# Patient Record
Sex: FEMALE | Race: WHITE | NOT HISPANIC OR LATINO | Employment: FULL TIME | URBAN - METROPOLITAN AREA
[De-identification: names, ages, dates, MRNs, and addresses within clinical notes are randomized per-mention and may not be internally consistent; named-entity substitution may affect disease eponyms.]

---

## 2017-02-10 DIAGNOSIS — N92.0 POLYMENORRHEA: Primary | ICD-10-CM

## 2017-02-10 NOTE — TELEPHONE ENCOUNTER
norgestrel-ethinyl estradiol (LO/OVRAL) 0.3-30 MG-MCG per tablet      Last Written Prescription Date: 2/8/16  Last Fill Quantity: 84, # refills: 5  Last Office Visit with G, P or [facility] prescribing provider: 3/3/16       BP Readings from Last 3 Encounters:   12/22/16 108/86   03/03/16 130/80   02/08/16 118/74     Date of last Breast Exam: .

## 2017-02-14 NOTE — TELEPHONE ENCOUNTER
Medication is being filled for 1 time refill only due to:  Patient needs to be seen because as it will be over a year since last seen in March .     Signed Prescriptions:                        Disp   Refills    norgestrel-ethinyl estradiol (LO/OVRAL) 0.*84 tab*0        Sig: Take 1 tablet by mouth daily  Authorizing Provider: KRISTINA DUNLAP  Ordering User: IGOR YOUSSEF, RN, BSN

## 2017-03-01 ENCOUNTER — TELEPHONE (OUTPATIENT)
Dept: FAMILY MEDICINE | Facility: CLINIC | Age: 42
End: 2017-03-01

## 2017-03-01 DIAGNOSIS — F41.9 ANXIETY: ICD-10-CM

## 2017-03-01 DIAGNOSIS — F51.01 PRIMARY INSOMNIA: ICD-10-CM

## 2017-03-01 RX ORDER — TRAZODONE HYDROCHLORIDE 50 MG/1
50 TABLET, FILM COATED ORAL
Qty: 30 TABLET | Refills: 0 | Status: SHIPPED | OUTPATIENT
Start: 2017-03-01 | End: 2017-05-30

## 2017-03-01 NOTE — TELEPHONE ENCOUNTER
FLUoxetine (PROZAC) 40 MG capsule  Last Written Prescription Date: 2/8/2016  Last Fill Quantity: 90, # refills: 4  Last Office Visit with [facility] primary care provider:  12/22/2016        Last PHQ-9 score on record=   PHQ-9 SCORE 12/22/2016   Total Score -   Total Score 6

## 2017-03-01 NOTE — TELEPHONE ENCOUNTER
Signed Prescriptions:                        Disp   Refills    traZODone (DESYREL) 50 MG tablet           30 tab*0        Sig: Take 1 tablet (50 mg) by mouth nightly as needed for           sleep . No further refills until follow-up           appointment  Authorizing Provider: MOISÉS WHYTE

## 2017-03-01 NOTE — TELEPHONE ENCOUNTER
Reason for Call:  Other prescription    Detailed comments: [facility] pharmacy called to check on the refill status for the prescriptionTrazodone    Fax number  [phone]    Phone Number Patient can be reached at: Other phone number:  [phone]    Best Time: today    Can we leave a detailed message on this number? YES    Call taken on 3/1/2017 at 10:09 AM by Ti Carbajal

## 2017-03-01 NOTE — TELEPHONE ENCOUNTER
Reason for call: med refill  Patient called regarding (reason for call): prescription- Fluoxetine  Additional comments: na    Phone Number Pt can be reached at: Other phone number:  [phone]*  Best Time: anytime  Can we leave a detailed message on this number? YES

## 2017-03-01 NOTE — TELEPHONE ENCOUNTER
traZODone (DESYREL) 50 MG tablet  Last Written Prescription Date: 2/8/2016  Last Fill Quantity: 90, # refills: 4  Last Office Visit with [facility], [facility] or [facility] prescribing provider: 12/22/2016        BP Readings from Last 3 Encounters:   12/22/16 108/86   03/03/16 130/80   02/08/16 118/74     Last PHQ-9 score on record=   PHQ-9 SCORE 12/22/2016   Total Score -   Total Score 6

## 2017-03-03 NOTE — TELEPHONE ENCOUNTER
Routing refill request to provider for review/approval because:  Patient's last PHQ-9 score was > 4.  Please review and advised on next step for plan of care.    Zane DALEY RN, BSN

## 2017-03-03 NOTE — TELEPHONE ENCOUNTER
Call pt  Recommend visit to discuss getting mood in better control--   Advise that we can adjust medication or do counseling or both  If she declines, OK to fill x 3 months, then have visit  Jerrica Hay MD

## 2017-03-05 RX ORDER — FLUOXETINE 40 MG/1
40 CAPSULE ORAL EVERY MORNING
Qty: 90 CAPSULE | Refills: 0 | Status: SHIPPED | OUTPATIENT
Start: 2017-03-05 | End: 2017-05-30

## 2017-04-04 DIAGNOSIS — F51.01 PRIMARY INSOMNIA: ICD-10-CM

## 2017-04-04 NOTE — TELEPHONE ENCOUNTER
traZODone (DESYREL) 50 MG tablet       Last Written Prescription Date: 3/1/17  Last Fill Quantity: 30; # refills: 0  Last Office Visit with [facility], [facility] or [facility] prescribing provider:  12/22/16        Last PHQ-9 score on record=   PHQ-9 SCORE 12/22/2016   Total Score -   Total Score 6       Lab Results   Component Value Date    AST 11 12/22/2016     Lab Results   Component Value Date    ALT 19 12/22/2016

## 2017-04-05 RX ORDER — TRAZODONE HYDROCHLORIDE 50 MG/1
TABLET, FILM COATED ORAL
Start: 2017-04-05

## 2017-04-05 NOTE — TELEPHONE ENCOUNTER
Refused Prescriptions:                       Disp   Refills    traZODone (DESYREL) 50 MG tablet [Pharmacy*                Sig: TAKE 1 TABLET BY MOUTH NIGHTLY AS NEEDED FOR SLEEP  Refused By: MOISÉS WHYTE  Reason for Refusal: Appt required, please call patient

## 2017-04-05 NOTE — TELEPHONE ENCOUNTER
Routing refill request to provider for review/approval because:  Elizabeth given x1 and patient did not follow up, please advise    Cathy Camarena RN - BC

## 2017-05-23 NOTE — PROGRESS NOTES
SUBJECTIVE:     CC: Megan Dowd is an 42 year old woman who presents for preventive health visit.     Healthy Habits:    Do you get at least three servings of calcium containing foods daily (dairy, green leafy vegetables, etc.)? yes    Amount of exercise or daily activities, outside of work: 3 day(s) per week    Problems taking medications regularly No    Medication side effects: No    Have you had an eye exam in the past two years? yes    Do you see a dentist twice per year? yes    Do you have sleep apnea, excessive snoring or daytime drowsiness?no             Today's PHQ-2 Score:   PHQ-2 ( 1999 Pfizer) 5/30/2017 5/30/2017   Q1: Little interest or pleasure in doing things 1 0   Q2: Feeling down, depressed or hopeless 0 0   PHQ-2 Score 1 0       Abuse: Current or Past(Physical, Sexual or Emotional)- No  Do you feel safe in your environment - Yes    Social History   Substance Use Topics     Smoking status: Current Every Day Smoker     Packs/day: 0.30     Years: 18.00     Types: Cigarettes     Last attempt to quit: 8/1/2009     Smokeless tobacco: Never Used     Alcohol use Yes      Comment: SOCIALLY     The patient does not drink >3 drinks per day nor >7 drinks per week.    Recent Labs   Lab Test  02/08/16   0843  06/15/11   0833   CHOL  166  184   HDL  52  62   LDL  102*  104   TRIG  62  93   CHOLHDLRATIO   --   3.0   NHDL  114   --        Reviewed orders with patient.  Reviewed health maintenance and updated orders accordingly - Yes    Mammo Decision Support:  Patient under age 50, mutual decision reflected in health maintenance.      Pertinent mammograms are reviewed under the imaging tab.  History of abnormal Pap smear: NO - age 30-65 PAP every 5 years with negative HPV co-testing recommended    Reviewed and updated as needed this visit by clinical staff  Tobacco  Allergies  Meds  Problems  Med Hx  Surg Hx  Fam Hx  Soc Hx          Reviewed and updated as needed this visit by Provider  Allergies   Meds  Problems        Immunization History   Administered Date(s) Administered     DPT 1974, 1974, 1974, 08/01/1975     Hepatitis A Vac Ped/Adol-2 Dose 12/01/2006, 06/22/2007, 02/15/2008     Influenza (IIV3) 10/20/2012, 10/01/2014, 11/01/2016     Influenza Vaccine IM 3yrs+ 4 Valent IIV4 10/08/2015     MMR 02/13/1975, 12/11/1992     OPV 1974, 1974, 08/01/1975     TD (ADULT, 7+) 07/24/2002     TDAP Vaccine (Adacel) 03/15/2010        ROS:  This 42 year old female is here today for annual female exam. She works full time at [employer]. 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 She has no children. Her  does the snow removal, but patient does the lawn mowing as she likes the relaxation while she mows their 6 acres. They have no children. Patient would like to use chantix again to quit smoking. She feels ready to quit.   Patient was seen at an ER in October after having blood in the stool after passing a hard bowel movement. She was told that her exam was normal. She has not had constipation since then, but still gets occasional blood with bowel movement. She wonders if she could have hemerrohoids. She worries she could have colon cancer as she constantly feels a pressure in her lower colon like she has to have a bowel movement and she can have up to 4 bowel movements daily. No pain with bowel movements.   She needs a refill of her xanax. She only uses about 10 yearly for rare anxiety episodes.   She wonders if she still needs to stay on birth control pills to suppress her ovarian cysts. Cysts have been very painful in the past and she doesn't want that pain again.   C: NEGATIVE for fever, chills, change in weight  I: NEGATIVE for worrisome rashes, moles or lesions  E: NEGATIVE for vision changes or irritation  ENT: NEGATIVE for ear, mouth and throat problems  R: NEGATIVE for significant cough or SOB  B: NEGATIVE for  masses, tenderness or discharge  CV: NEGATIVE for chest pain, palpitations or peripheral edema  GI: NEGATIVE for nausea, abdominal pain, heartburn, or change in bowel habits  : NEGATIVE for unusual urinary or vaginal symptoms. Periods are regular.  M: NEGATIVE for significant arthralgias or myalgia  N: NEGATIVE for weakness, dizziness or paresthesias  P: NEGATIVE for changes in mood or affect    Problem list, Medication list, Allergies, and Medical/Social/Surgical histories reviewed in [facility] and updated as appropriate.  Labs reviewed in EPIC  BP Readings from Last 3 Encounters:   05/30/17 136/74   12/22/16 108/86   03/03/16 130/80    Wt Readings from Last 3 Encounters:   05/30/17 228 lb (103.4 kg)   12/22/16 238 lb (108 kg)   03/03/16 227 lb (103 kg)                  Patient Active Problem List   Diagnosis     Insomnia     Anxiety     IBS (irritable bowel syndrome)     CARDIOVASCULAR SCREENING; LDL GOAL LESS THAN 160     Family history of diabetes mellitus     Polymenorrhea     Dysmenorrhea     Ovarian cyst     Past Surgical History:   Procedure Laterality Date     BREAST SURGERY  3/2013    breast implants     SURGICAL HISTORY OF -   3/13    bilateral breast augmentations with implants     TUBAL LIGATION  4/07       Social History   Substance Use Topics     Smoking status: Current Every Day Smoker     Packs/day: 0.30     Years: 18.00     Types: Cigarettes     Last attempt to quit: 8/1/2009     Smokeless tobacco: Never Used     Alcohol use Yes      Comment: SOCIALLY     Family History   Problem Relation Age of Onset     Hypertension Mother      DIABETES Father      Hypertension Father      Gynecology Maternal Grandmother      ENDOMETRIOSIS     HEART DISEASE Maternal Grandmother      MINI HEART ATTACKS     Unknown/Adopted Maternal Grandfather      CANCER Paternal Grandmother      CANCER Paternal Grandfather      CANCER OF SPINE     Gynecology Sister      endometriosis--had hysterectomy     Gynecology Sister      has  "had cysts--had partial hysterectomy         Current Outpatient Prescriptions   Medication Sig Dispense Refill     FLUoxetine (PROZAC) 40 MG capsule Take 1 capsule (40 mg) by mouth every morning 90 capsule 4     traZODone (DESYREL) 50 MG tablet Take 1 tablet (50 mg) by mouth nightly as needed for sleep 90 tablet 4     norgestrel-ethinyl estradiol (LO/OVRAL) 0.3-30 MG-MCG per tablet Take 1 tablet by mouth daily 84 tablet 4     ALPRAZolam (XANAX) 0.5 MG tablet Take 1 tablet (0.5 mg) by mouth as needed for anxiety 10 tablet 0     varenicline (CHANTIX STARTING MONTH PAK) 0.5 MG X 11 & 1 MG X 42 tablet Take 0.5 mg tab daily for 3 days, then 0.5 mg tab twice daily for 4 days, then 1 mg twice daily. 53 tablet 0     varenicline (CHANTIX) 1 MG tablet Take 1 tablet (1 mg) by mouth 2 times daily 56 tablet 2     omeprazole (PRILOSEC) 20 MG CR capsule Take 1 capsule (20 mg) by mouth daily 30 capsule 2     [DISCONTINUED] FLUoxetine (PROZAC) 40 MG capsule Take 1 capsule (40 mg) by mouth every morning 90 capsule 0     [DISCONTINUED] traZODone (DESYREL) 50 MG tablet Take 1 tablet (50 mg) by mouth nightly as needed for sleep . No further refills until follow-up appointment 30 tablet 0     [DISCONTINUED] norgestrel-ethinyl estradiol (LO/OVRAL) 0.3-30 MG-MCG per tablet Take 1 tablet by mouth daily 84 tablet 0     Allergies   Allergen Reactions     Lexapro      Increased anxiety       Zoloft      NIGHT SWEATS AND RASH FROM SWEATING AND INCREASE IN WEIGHT     OBJECTIVE:     /74 (BP Location: Right arm, Patient Position: Chair, Cuff Size: Adult Large)  Pulse 87  Temp 99.1  F (37.3  C)  Ht 5' 5\" (1.651 m)  Wt 228 lb (103.4 kg)  SpO2 97%  BMI 37.94 kg/m2  EXAM:  GENERAL: healthy, alert and no distress  EYES: Eyes grossly normal to inspection, PERRL and conjunctivae and sclerae normal  HENT: ear canals and TM's normal, nose and mouth without ulcers or lesions  NECK: no adenopathy, no asymmetry, masses, or scars and thyroid normal " to palpation  RESP: lungs clear to auscultation - no rales, rhonchi or wheezes  BREAST: normal without masses, tenderness or nipple discharge and no palpable axillary masses or adenopathy. She has had breast reduction surgery and her scars have healed well.   CV: regular rate and rhythm, normal S1 S2, no S3 or S4, no murmur, click or rub, no peripheral edema and peripheral pulses strong  ABDOMEN: soft, nontender, no hepatosplenomegaly, no masses and bowel sounds normal   (female): normal female external genitalia, normal urethral meatus, vaginal mucosa pink, moist, well rugated, and normal cervix/adnexa/uterus without masses or discharge  MS: no gross musculoskeletal defects noted, no edema  SKIN: no suspicious lesions or rashes  NEURO: Normal strength and tone, mentation intact and speech normal  PSYCH: mentation appears normal, affect normal/bright    ASSESSMENT/PLAN:     1. Encounter for routine adult health examination without abnormal findings  Healthy but overweight. But has lost 10 pounds in the past 6 months    2. Primary insomnia  Good control   - traZODone (DESYREL) 50 MG tablet; Take 1 tablet (50 mg) by mouth nightly as needed for sleep  Dispense: 90 tablet; Refill: 4    3. Polymenorrhea  Good control   - norgestrel-ethinyl estradiol (LO/OVRAL) 0.3-30 MG-MCG per tablet; Take 1 tablet by mouth daily  Dispense: 84 tablet; Refill: 4    4. Anxiety  Good control   - FLUoxetine (PROZAC) 40 MG capsule; Take 1 capsule (40 mg) by mouth every morning  Dispense: 90 capsule; Refill: 4  - ALPRAZolam (XANAX) 0.5 MG tablet; Take 1 tablet (0.5 mg) by mouth as needed for anxiety  Dispense: 10 tablet; Refill: 0    5. Rectal bleeding  As above, this is worrisome   - GASTROENTEROLOGY ADULT REF PROCEDURE ONLY  - CBC with platelets differential    6. Dense breasts  due  - *MA Screening Digital Bilateral; Future    7. Encounter for screening mammogram for breast cancer  due  - *MA Screening Digital Bilateral; Future    8.  "Screening for malignant neoplasm of cervix  due  - Pap imaged thin layer screen with HPV - recommended age 30 - 65 years (select HPV order below)  - HPV High Risk Types DNA Cervical    9. CARDIOVASCULAR SCREENING; LDL GOAL LESS THAN 160  due  - Lipid panel reflex to direct LDL    10. Screening for diabetes mellitus  At risk   - Glucose    11. Tobacco use disorder  As above   - varenicline (CHANTIX STARTING MONTH PAK) 0.5 MG X 11 & 1 MG X 42 tablet; Take 0.5 mg tab daily for 3 days, then 0.5 mg tab twice daily for 4 days, then 1 mg twice daily.  Dispense: 53 tablet; Refill: 0  - varenicline (CHANTIX) 1 MG tablet; Take 1 tablet (1 mg) by mouth 2 times daily  Dispense: 56 tablet; Refill: 2    COUNSELING:   Reviewed preventive health counseling, as reflected in patient instructions       Regular exercise       Healthy diet/nutrition         reports that she has been smoking Cigarettes.  She has a 5.40 pack-year smoking history. She has never used smokeless tobacco.  Tobacco Cessation Action Plan: pt will start chantix again  Estimated body mass index is 37.94 kg/(m^2) as calculated from the following:    Height as of this encounter: 5' 5\" (1.651 m).    Weight as of this encounter: 228 lb (103.4 kg).   Weight management plan: Discussed healthy diet and exercise guidelines and patient will follow up in 12 months in clinic to re-evaluate.    Counseling Resources:  ATP IV Guidelines  Pooled Cohorts Equation Calculator  Breast Cancer Risk Calculator  FRAX Risk Assessment  ICSI Preventive Guidelines  Dietary Guidelines for Americans, 2010  USDA's MyPlate  ASA Prophylaxis  Lung CA Screening    KRISTINA DUNLAP MD  [facility]  "

## 2017-05-23 NOTE — PATIENT INSTRUCTIONS
Preventive Health Recommendations  Female Ages 40 to 49    Yearly exam:     See your health care provider every year in order to  1. Review health changes.   2. Discuss preventive care.    3. Review your medicines if your doctor prescribed any.      Get a Pap test every three years (unless you have an abnormal result and your provider advises testing more often).      If you get Pap tests with HPV test, you only need to test every 5 years, unless you have an abnormal result. You do not need a Pap test if your uterus was removed (hysterectomy) and you have not had cancer.      You should be tested each year for STDs (sexually transmitted diseases), if you're at risk.       Ask your doctor if you should have a mammogram.      Have a colonoscopy (test for colon cancer) if someone in your family has had colon cancer or polyps before age 50.       Have a cholesterol test every 5 years.       Have a diabetes test (fasting glucose) after age 45. If you are at risk for diabetes, you should have this test every 3 years.    Shots: Get a flu shot each year. Get a tetanus shot every 10 years.     Nutrition:     Eat at least 5 servings of fruits and vegetables each day.    Eat whole-grain bread, whole-wheat pasta and brown rice instead of white grains and rice.    Talk to your provider about Calcium and Vitamin D.     Lifestyle    Exercise at least 150 minutes a week (an average of 30 minutes a day, 5 days a week). This will help you control your weight and prevent disease.    Limit alcohol to one drink per day.    No smoking.     Wear sunscreen to prevent skin cancer.    See your dentist every six months for an exam and cleaning.    [facility]    If you have any questions regarding to your visit please contact your care team:       Team Purple:   Clinic Hours Telephone Number   [address] Dr.   7am-7pm  Monday - Thursday   7am-5pm  Fridays  [phone]  (Appointment scheduling available 24/7)    Questions about your Visit?   Team Line:  [phone]   Urgent Care - [place] and [place] - 11am-9pm Monday-Friday Saturday-Sunday- 9am-5pm   [place] - 5pm-9pm Monday-Friday Saturday-Sunday- 9am-5pm  [phone] - [place]   [phone] - [place]       What options do I have for visits at the clinic other than the traditional office visit?  To expand how we care for you, many of our providers are utilizing electronic visits (e-visits) and telephone visits, when medically appropriate, for interactions with their patients rather than a visit in the clinic.   We also offer nurse visits for many medical concerns. Just like any other service, we will bill your insurance company for this type of visit based on time spent on the phone with your provider. Not all insurance companies cover these visits. Please check with your medical insurance if this type of visit is covered. You will be responsible for any charges that are not paid by your insurance.      E-visits via [employer]:  generally incur a $35.00 fee.  Telephone visits:  Time spent on the phone: *charged based on time that is spent on the phone in increments of 10 minutes. Estimated cost:   5-10 mins $30.00   11-20 mins. $59.00   21-30 mins. $85.00     Use [employer] (secure email communication and access to your chart) to send your primary care provider a message or make an appointment. Ask someone on your Team how to sign up for [employer].  For a Price Quote for your services, please call our Consumer Price Line at [phone].  As always, Thank you for trusting us with your health care needs!

## 2017-05-30 ENCOUNTER — OFFICE VISIT (OUTPATIENT)
Dept: FAMILY MEDICINE | Facility: CLINIC | Age: 42
End: 2017-05-30
Payer: COMMERCIAL

## 2017-05-30 VITALS
HEART RATE: 87 BPM | BODY MASS INDEX: 37.99 KG/M2 | DIASTOLIC BLOOD PRESSURE: 74 MMHG | WEIGHT: 228 LBS | TEMPERATURE: 99.1 F | OXYGEN SATURATION: 97 % | HEIGHT: 65 IN | SYSTOLIC BLOOD PRESSURE: 136 MMHG

## 2017-05-30 DIAGNOSIS — Z00.00 ENCOUNTER FOR ROUTINE ADULT HEALTH EXAMINATION WITHOUT ABNORMAL FINDINGS: Primary | ICD-10-CM

## 2017-05-30 DIAGNOSIS — Z13.1 SCREENING FOR DIABETES MELLITUS: ICD-10-CM

## 2017-05-30 DIAGNOSIS — F51.01 PRIMARY INSOMNIA: ICD-10-CM

## 2017-05-30 DIAGNOSIS — Z13.6 CARDIOVASCULAR SCREENING; LDL GOAL LESS THAN 160: ICD-10-CM

## 2017-05-30 DIAGNOSIS — N92.0 POLYMENORRHEA: ICD-10-CM

## 2017-05-30 DIAGNOSIS — Z12.31 ENCOUNTER FOR SCREENING MAMMOGRAM FOR BREAST CANCER: ICD-10-CM

## 2017-05-30 DIAGNOSIS — K62.5 RECTAL BLEEDING: ICD-10-CM

## 2017-05-30 DIAGNOSIS — F17.200 TOBACCO USE DISORDER: ICD-10-CM

## 2017-05-30 DIAGNOSIS — F41.9 ANXIETY: ICD-10-CM

## 2017-05-30 DIAGNOSIS — R92.30 DENSE BREASTS: ICD-10-CM

## 2017-05-30 DIAGNOSIS — Z12.4 SCREENING FOR MALIGNANT NEOPLASM OF CERVIX: ICD-10-CM

## 2017-05-30 LAB
BASOPHILS # BLD AUTO: 0 10E9/L (ref 0–0.2)
BASOPHILS NFR BLD AUTO: 0.2 %
CHOLEST SERPL-MCNC: 204 MG/DL
DIFFERENTIAL METHOD BLD: NORMAL
EOSINOPHIL # BLD AUTO: 0.1 10E9/L (ref 0–0.7)
EOSINOPHIL NFR BLD AUTO: 1 %
ERYTHROCYTE [DISTWIDTH] IN BLOOD BY AUTOMATED COUNT: 13 % (ref 10–15)
GLUCOSE SERPL-MCNC: 86 MG/DL (ref 70–99)
HCT VFR BLD AUTO: 43 % (ref 35–47)
HDLC SERPL-MCNC: 61 MG/DL
HGB BLD-MCNC: 14.2 G/DL (ref 11.7–15.7)
LDLC SERPL CALC-MCNC: 126 MG/DL
LYMPHOCYTES # BLD AUTO: 2.2 10E9/L (ref 0.8–5.3)
LYMPHOCYTES NFR BLD AUTO: 24.9 %
MCH RBC QN AUTO: 30.7 PG (ref 26.5–33)
MCHC RBC AUTO-ENTMCNC: 33 G/DL (ref 31.5–36.5)
MCV RBC AUTO: 93 FL (ref 78–100)
MONOCYTES # BLD AUTO: 0.6 10E9/L (ref 0–1.3)
MONOCYTES NFR BLD AUTO: 6.4 %
NEUTROPHILS # BLD AUTO: 5.9 10E9/L (ref 1.6–8.3)
NEUTROPHILS NFR BLD AUTO: 67.5 %
NONHDLC SERPL-MCNC: 143 MG/DL
PLATELET # BLD AUTO: 284 10E9/L (ref 150–450)
RBC # BLD AUTO: 4.63 10E12/L (ref 3.8–5.2)
TRIGL SERPL-MCNC: 83 MG/DL
WBC # BLD AUTO: 8.7 10E9/L (ref 4–11)

## 2017-05-30 PROCEDURE — 80061 LIPID PANEL: CPT | Performed by: FAMILY MEDICINE

## 2017-05-30 PROCEDURE — G0145 SCR C/V CYTO,THINLAYER,RESCR: HCPCS | Performed by: FAMILY MEDICINE

## 2017-05-30 PROCEDURE — 99396 PREV VISIT EST AGE 40-64: CPT | Performed by: FAMILY MEDICINE

## 2017-05-30 PROCEDURE — 82947 ASSAY GLUCOSE BLOOD QUANT: CPT | Performed by: FAMILY MEDICINE

## 2017-05-30 PROCEDURE — 85025 COMPLETE CBC W/AUTO DIFF WBC: CPT | Performed by: FAMILY MEDICINE

## 2017-05-30 PROCEDURE — 36415 COLL VENOUS BLD VENIPUNCTURE: CPT | Performed by: FAMILY MEDICINE

## 2017-05-30 PROCEDURE — 87624 HPV HI-RISK TYP POOLED RSLT: CPT | Performed by: FAMILY MEDICINE

## 2017-05-30 RX ORDER — TRAZODONE HYDROCHLORIDE 50 MG/1
50 TABLET, FILM COATED ORAL
Qty: 90 TABLET | Refills: 4 | Status: SHIPPED | OUTPATIENT
Start: 2017-05-30 | End: 2018-04-26

## 2017-05-30 RX ORDER — ALPRAZOLAM 0.5 MG
0.5 TABLET ORAL PRN
Qty: 10 TABLET | Refills: 0 | Status: SHIPPED | OUTPATIENT
Start: 2017-05-30 | End: 2018-04-26

## 2017-05-30 RX ORDER — VARENICLINE TARTRATE 1 MG/1
1 TABLET, FILM COATED ORAL 2 TIMES DAILY
Qty: 56 TABLET | Refills: 2 | Status: SHIPPED | OUTPATIENT
Start: 2017-05-30 | End: 2018-06-15

## 2017-05-30 RX ORDER — FLUOXETINE 40 MG/1
40 CAPSULE ORAL EVERY MORNING
Qty: 90 CAPSULE | Refills: 4 | Status: SHIPPED | OUTPATIENT
Start: 2017-05-30 | End: 2018-04-26

## 2017-05-30 ASSESSMENT — ANXIETY QUESTIONNAIRES
7. FEELING AFRAID AS IF SOMETHING AWFUL MIGHT HAPPEN: SEVERAL DAYS
IF YOU CHECKED OFF ANY PROBLEMS ON THIS QUESTIONNAIRE, HOW DIFFICULT HAVE THESE PROBLEMS MADE IT FOR YOU TO DO YOUR WORK, TAKE CARE OF THINGS AT HOME, OR GET ALONG WITH OTHER PEOPLE: SOMEWHAT DIFFICULT
5. BEING SO RESTLESS THAT IT IS HARD TO SIT STILL: NOT AT ALL
GAD7 TOTAL SCORE: 6
1. FEELING NERVOUS, ANXIOUS, OR ON EDGE: SEVERAL DAYS
2. NOT BEING ABLE TO STOP OR CONTROL WORRYING: SEVERAL DAYS
6. BECOMING EASILY ANNOYED OR IRRITABLE: SEVERAL DAYS
3. WORRYING TOO MUCH ABOUT DIFFERENT THINGS: SEVERAL DAYS

## 2017-05-30 ASSESSMENT — PATIENT HEALTH QUESTIONNAIRE - PHQ9: 5. POOR APPETITE OR OVEREATING: SEVERAL DAYS

## 2017-05-30 NOTE — NURSING NOTE
"Chief Complaint   Patient presents with     Physical       Initial /74 (BP Location: Right arm, Patient Position: Chair, Cuff Size: Adult Large)  Pulse 87  Temp 99.1  F (37.3  C)  Ht 5' 5\" (1.651 m)  Wt 228 lb (103.4 kg)  SpO2 97%  BMI 37.94 kg/m2 Estimated body mass index is 37.94 kg/(m^2) as calculated from the following:    Height as of this encounter: 5' 5\" (1.651 m).    Weight as of this encounter: 228 lb (103.4 kg).  Medication Reconciliation: complete   Kelly Dixon MA      "

## 2017-05-30 NOTE — MR AVS SNAPSHOT
After Visit Summary   5/30/2017    Megan Dowd    MRN: [id number]           Patient Information     Date Of Birth          1975        Visit Information        Provider Department      5/30/2017 9:15 AM Emilee Ulloa MD [facility]        Today's Diagnoses     Encounter for routine adult health examination without abnormal findings    -  1    Primary insomnia        Polymenorrhea        Anxiety        Dense breasts        Encounter for screening mammogram for breast cancer        Screening for malignant neoplasm of cervix        Rectal bleeding        CARDIOVASCULAR SCREENING; LDL GOAL LESS THAN 160        Screening for diabetes mellitus          Care Instructions      Preventive Health Recommendations  Female Ages 40 to 49    Yearly exam:     See your health care provider every year in order to  1. Review health changes.   2. Discuss preventive care.    3. Review your medicines if your doctor prescribed any.      Get a Pap test every three years (unless you have an abnormal result and your provider advises testing more often).      If you get Pap tests with HPV test, you only need to test every 5 years, unless you have an abnormal result. You do not need a Pap test if your uterus was removed (hysterectomy) and you have not had cancer.      You should be tested each year for STDs (sexually transmitted diseases), if you're at risk.       Ask your doctor if you should have a mammogram.      Have a colonoscopy (test for colon cancer) if someone in your family has had colon cancer or polyps before age 50.       Have a cholesterol test every 5 years.       Have a diabetes test (fasting glucose) after age 45. If you are at risk for diabetes, you should have this test every 3 years.    Shots: Get a flu shot each year. Get a tetanus shot every 10 years.     Nutrition:     Eat at least 5 servings of fruits and vegetables each day.    Eat whole-grain bread, whole-wheat pasta and  brown rice instead of white grains and rice.    Talk to your provider about Calcium and Vitamin D.     Lifestyle    Exercise at least 150 minutes a week (an average of 30 minutes a day, 5 days a week). This will help you control your weight and prevent disease.    Limit alcohol to one drink per day.    No smoking.     Wear sunscreen to prevent skin cancer.    See your dentist every six months for an exam and cleaning.    [facility] (formerly [facility])    If you have any questions regarding to your visit please contact your care team:       Team Purple:   Clinic Hours Telephone Number   [address] Dr.   7am-7pm  Monday - Thursday   7am-5pm  Fridays  [phone]  (Appointment scheduling available 24/7)    Questions about your Visit?   Team Line:  [phone]   Urgent Care - [place] and [facility] - 11am-9pm Monday-Friday Saturday-Sunday- 9am-5pm   [place] - 5pm-9pm Monday-Friday Saturday-Sunday- 9am-5pm  [phone] - [facility]  [phone] - [place]       What options do I have for visits at the clinic other than the traditional office visit?  To expand how we care for you, many of our providers are utilizing electronic visits (e-visits) and telephone visits, when medically appropriate, for interactions with their patients rather than a visit in the clinic.   We also offer nurse visits for many medical concerns. Just like any other service, we will bill your insurance company for this type of visit based on time spent on the phone with your provider. Not all insurance companies cover these visits. Please check with your medical insurance if this type of visit is covered. You will be responsible for any charges that are not paid by your insurance.      E-visits via [employer] ([employer]el[employer]):  generally incur a $35.00 fee.  Telephone visits:  Time spent on the phone: *charged based on time that is spent on the phone in increments of 10 minutes. Estimated  cost:   5-10 mins $30.00   11-20 mins. $59.00   21-30 mins. $85.00     Use [employer] (secure email communication and access to your chart) to send your primary care provider a message or make an appointment. Ask someone on your Team how to sign up for [employer].  For a Price Quote for your services, please call our [email or web address] Line at [phone].  As always, Thank you for trusting us with your health care needs!              Follow-ups after your visit        Additional Services     GASTROENTEROLOGY ADULT REF PROCEDURE ONLY       Last Lab Result: Creatinine (mg/dL)       Date                     Value                 12/22/2016               0.90             ----------  Body mass index is 37.94 kg/(m^2).      Patient will be contacted to schedule procedure.     Please be aware that coverage of these services is subject to the terms and limitations of your health insurance plan.  Call member services at your health plan with any benefit or coverage questions.  Any procedures must be performed at a [place] facility OR coordinated by your clinic's referral office.    Please bring the following with you to your appointment:    (1) Any X-Rays, CTs or MRIs which have been performed.  Contact the facility where they were done to arrange for  prior to your scheduled appointment.    (2) List of current medications   (3) This referral request   (4) Any documents/labs given to you for this referral                  Future tests that were ordered for you today     Open Future Orders        Priority Expected Expires Ordered    *MA Screening Digital Bilateral Routine  5/30/2018 5/30/2017            Who to contact     If you have questions or need follow up information about today's clinic visit or your schedule please contact [facility] directly at [phone].  Normal or non-critical lab and imaging results will be communicated to you by MyChart, letter or phone within 4 business days after the clinic has  "received the results. If you do not hear from us within 7 days, please contact the clinic through [employer] or phone. If you have a critical or abnormal lab result, we will notify you by phone as soon as possible.  Submit refill requests through [employer]ingHero or call your pharmacy and they will forward the refill request to us. Please allow 3 business days for your refill to be completed.          Additional Information About Your Visit        RankingHero Information     [employer] lets you send messages to your doctor, view your test results, renew your prescriptions, schedule appointments and more. To sign up, go to [email or web address] . Click on \"Log in\" on the left side of the screen, which will take you to the Welcome page. Then click on \"Sign up Now\" on the right side of the page.     You will be asked to enter the access code listed below, as well as some personal information. Please follow the directions to create your username and password.     Your access code is: [email or web address]  Expires: 2017 10:16 AM     Your access code will  in 90 days. If you need help or a new code, please call your [place] clinic or [phone].        Care EveryWhere ID     This is your Care EveryWhere ID. This could be used by other organizations to access your [place] medical records  [id number]        Your Vitals Were     Pulse Temperature Height Pulse Oximetry BMI (Body Mass Index)       87 99.1  F (37.3  C) 5' 5\" (1.651 m) 97% 37.94 kg/m2        Blood Pressure from Last 3 Encounters:   17 136/74   16 108/86   16 130/80    Weight from Last 3 Encounters:   17 228 lb (103.4 kg)   16 238 lb (108 kg)   16 227 lb (103 kg)              We Performed the Following     CBC with platelets differential     GASTROENTEROLOGY ADULT REF PROCEDURE ONLY     Glucose     HPV High Risk Types DNA Cervical     Lipid panel reflex to direct LDL     Pap imaged thin layer screen with HPV - recommended age 30 - 65 " years (select HPV order below)          Today's Medication Changes          These changes are accurate as of: 5/30/17 10:16 AM.  If you have any questions, ask your nurse or doctor.               These medicines have changed or have updated prescriptions.        Dose/Directions    traZODone 50 MG tablet   Commonly known as:  DESYREL   This may have changed:  additional instructions   Used for:  Primary insomnia   Changed by:  Emilee Ulloa MD        Dose:  50 mg   Take 1 tablet (50 mg) by mouth nightly as needed for sleep   Quantity:  90 tablet   Refills:  4            Where to get your medicines      These medications were sent to [place] [address] [facility] - [facility] [address] [place] [id number]     Phone:  [phone]     FLUoxetine 40 MG capsule    norgestrel-ethinyl estradiol 0.3-30 MG-MCG per tablet    traZODone 50 MG tablet         Some of these will need a paper prescription and others can be bought over the counter.  Ask your nurse if you have questions.     Bring a paper prescription for each of these medications     ALPRAZolam 0.5 MG tablet                Primary Care Provider Office Phone # Fax #    Emilee Ulloa [phone]       [address] [address]        Thank you!     Thank you for choosing [facility]  for your care. Our goal is always to provide you with excellent care. Hearing back from our patients is one way we can continue to improve our services. Please take a few minutes to complete the written survey that you may receive in the mail after your visit with us. Thank you!             Your Updated Medication List - Protect others around you: Learn how to safely use, store and throw away your medicines at www.disposemymeds.org.          This list is accurate as of: 5/30/17 10:16 AM.  Always use your most recent med list.                   Brand Name Dispense  Instructions for use    ALPRAZolam 0.5 MG tablet    XANAX    10 tablet    Take 1 tablet (0.5 mg) by mouth as needed for anxiety       FLUoxetine 40 MG capsule    PROZAC    90 capsule    Take 1 capsule (40 mg) by mouth every morning       norgestrel-ethinyl estradiol 0.3-30 MG-MCG per tablet    LO/OVRAL    84 tablet    Take 1 tablet by mouth daily       omeprazole 20 MG CR capsule    priLOSEC    30 capsule    Take 1 capsule (20 mg) by mouth daily       traZODone 50 MG tablet    DESYREL    90 tablet    Take 1 tablet (50 mg) by mouth nightly as needed for sleep

## 2017-05-30 NOTE — LETTER
[address] [place] [address]    May 31, 2017    Megan Dowd  [address] [facility] [address]          Dear Megan,  All of your blood tests are normal. Continue your healthy lifestyle.   Enclosed is a copy of your results.     Results for orders placed or performed in visit on 05/30/17   CBC with platelets differential   Result Value Ref Range    WBC 8.7 4.0 - 11.0 10e9/L    RBC Count 4.63 3.8 - 5.2 10e12/L    Hemoglobin 14.2 11.7 - 15.7 g/dL    Hematocrit 43.0 35.0 - 47.0 %    MCV 93 78 - 100 fl    MCH 30.7 26.5 - 33.0 pg    MCHC 33.0 31.5 - 36.5 g/dL    RDW 13.0 10.0 - 15.0 %    Platelet Count 284 150 - 450 10e9/L    Diff Method Automated Method     % Neutrophils 67.5 %    % Lymphocytes 24.9 %    % Monocytes 6.4 %    % Eosinophils 1.0 %    % Basophils 0.2 %    Absolute Neutrophil 5.9 1.6 - 8.3 10e9/L    Absolute Lymphocytes 2.2 0.8 - 5.3 10e9/L    Absolute Monocytes 0.6 0.0 - 1.3 10e9/L    Absolute Eosinophils 0.1 0.0 - 0.7 10e9/L    Absolute Basophils 0.0 0.0 - 0.2 10e9/L   Lipid panel reflex to direct LDL   Result Value Ref Range    Cholesterol 204 (H) <200 mg/dL    Triglycerides 83 <150 mg/dL    HDL Cholesterol 61 >49 mg/dL    LDL Cholesterol Calculated 126 (H) <100 mg/dL    Non HDL Cholesterol 143 (H) <130 mg/dL   Glucose   Result Value Ref Range    Glucose 86 70 - 99 mg/dL       If you have any questions or concerns, please call myself or my nurse at [phone].      Sincerely,        Emilee Ulloa MD/pb

## 2017-05-31 ASSESSMENT — ANXIETY QUESTIONNAIRES: GAD7 TOTAL SCORE: 6

## 2017-05-31 ASSESSMENT — PATIENT HEALTH QUESTIONNAIRE - PHQ9: SUM OF ALL RESPONSES TO PHQ QUESTIONS 1-9: 7

## 2017-06-01 LAB
COPATH REPORT: NORMAL
PAP: NORMAL

## 2017-06-02 ENCOUNTER — RADIANT APPOINTMENT (OUTPATIENT)
Dept: MAMMOGRAPHY | Facility: CLINIC | Age: 42
End: 2017-06-02
Attending: FAMILY MEDICINE
Payer: COMMERCIAL

## 2017-06-02 DIAGNOSIS — Z12.31 ENCOUNTER FOR SCREENING MAMMOGRAM FOR BREAST CANCER: ICD-10-CM

## 2017-06-02 PROCEDURE — G0202 SCR MAMMO BI INCL CAD: HCPCS | Mod: TC

## 2017-06-02 PROCEDURE — 77063 BREAST TOMOSYNTHESIS BI: CPT | Mod: TC

## 2017-06-05 LAB
FINAL DIAGNOSIS: NORMAL
HPV HR 12 DNA CVX QL NAA+PROBE: NEGATIVE
HPV16 DNA SPEC QL NAA+PROBE: NEGATIVE
HPV18 DNA SPEC QL NAA+PROBE: NEGATIVE
SPECIMEN DESCRIPTION: NORMAL

## 2017-06-06 ENCOUNTER — TRANSFERRED RECORDS (OUTPATIENT)
Dept: HEALTH INFORMATION MANAGEMENT | Facility: CLINIC | Age: 42
End: 2017-06-06

## 2018-04-23 NOTE — PROGRESS NOTES
SUBJECTIVE:   Megan Dowd is a 43 year old female who presents to clinic today for the following health issues:      Patient presents with:  Numbness: in both hand. will get sharp pain and burn sensation ongoing but the last 1 month it had been getting worse             Problem list and histories reviewed & adjusted, as indicated.  Additional history: as documented    Patient Active Problem List   Diagnosis     Insomnia     Anxiety     IBS (irritable bowel syndrome)     CARDIOVASCULAR SCREENING; LDL GOAL LESS THAN 160     Family history of diabetes mellitus     Polymenorrhea     Dysmenorrhea     Ovarian cyst     Past Surgical History:   Procedure Laterality Date     BREAST SURGERY  3/2013    breast implants     SURGICAL HISTORY OF -   3/13    bilateral breast augmentations with implants     TUBAL LIGATION  4/07       Social History   Substance Use Topics     Smoking status: Former Smoker     Packs/day: 0.30     Years: 18.00     Types: Cigarettes     Quit date: 7/1/2017     Smokeless tobacco: Never Used     Alcohol use Yes      Comment: SOCIALLY     Family History   Problem Relation Age of Onset     Hypertension Mother      DIABETES Father      Hypertension Father      Gynecology Maternal Grandmother      ENDOMETRIOSIS     HEART DISEASE Maternal Grandmother      MINI HEART ATTACKS     Unknown/Adopted Maternal Grandfather      CANCER Paternal Grandmother      CANCER Paternal Grandfather      CANCER OF SPINE     Gynecology Sister      endometriosis--had hysterectomy     Gynecology Sister      has had cysts--had partial hysterectomy         Current Outpatient Prescriptions   Medication Sig Dispense Refill     ALPRAZolam (XANAX) 0.5 MG tablet Take 1 tablet (0.5 mg) by mouth as needed for anxiety 10 tablet 0     FLUoxetine (PROZAC) 40 MG capsule Take 1 capsule (40 mg) by mouth every morning 90 capsule 4     norgestrel-ethinyl estradiol (LO/OVRAL) 0.3-30 MG-MCG per tablet Take 1 tablet by mouth daily 84 tablet 4      omeprazole (PRILOSEC) 20 MG CR capsule Take 1 capsule (20 mg) by mouth daily 30 capsule 2     order for DME Equipment being ordered: bilateral wrist splints with thumb 2 each 0     traZODone (DESYREL) 50 MG tablet Take 1 tablet (50 mg) by mouth nightly as needed for sleep 90 tablet 4     triamterene-hydrochlorothiazide (DYAZIDE) 37.5-25 MG per capsule Take 1 capsule by mouth daily 90 capsule 3     varenicline (CHANTIX STARTING MONTH PORFIRIO) 0.5 MG X 11 & 1 MG X 42 tablet Take 0.5 mg tab daily for 3 days, then 0.5 mg tab twice daily for 4 days, then 1 mg twice daily. (Patient not taking: Reported on 4/26/2018) 53 tablet 0     varenicline (CHANTIX) 1 MG tablet Take 1 tablet (1 mg) by mouth 2 times daily (Patient not taking: Reported on 4/26/2018) 56 tablet 2     [DISCONTINUED] FLUoxetine (PROZAC) 40 MG capsule Take 1 capsule (40 mg) by mouth every morning 90 capsule 4     [DISCONTINUED] norgestrel-ethinyl estradiol (LO/OVRAL) 0.3-30 MG-MCG per tablet Take 1 tablet by mouth daily 84 tablet 4     [DISCONTINUED] traZODone (DESYREL) 50 MG tablet Take 1 tablet (50 mg) by mouth nightly as needed for sleep 90 tablet 4     Allergies   Allergen Reactions     Lexapro      Increased anxiety       Zoloft      NIGHT SWEATS AND RASH FROM SWEATING AND INCREASE IN WEIGHT     BP Readings from Last 3 Encounters:   04/26/18 130/60   05/30/17 136/74   12/22/16 108/86    Wt Readings from Last 3 Encounters:   04/26/18 242 lb 3.2 oz (109.9 kg)   05/30/17 228 lb (103.4 kg)   12/22/16 238 lb (108 kg)                  Labs reviewed in EPIC    Reviewed and updated as needed this visit by clinical staff       Reviewed and updated as needed this visit by Provider         ROS:  This 43 year old female is here today because she has developed severe pain in both of her wrists for the past month. It is causing her thumbs and index and middle fingers to feel numb. 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 She is right handed but both hands feel the same.  "She finds it hard to sleep at night due to the pain. She has resorted to using her 's left over vicodin to help her sleep at night since Ibuprofen isn't helping. She doesn't use much salt in her diet. Is ordering [email or web address] meals to be delivered which are low in salt. She has gained 14 pounds in the past year and is trying to lose some weight. She needs her meds refilled as well. She usually uses about 10 xanax 0.5 mg yearly for anxiety that is not controlled with her daily prozac.   All other review of systems are negative  Personal, family, and social history reviewed with patient and revised.         OBJECTIVE:     /60  Pulse 76  Temp 97  F (36.1  C) (Oral)  Resp 12  Ht 5' 5.08\" (1.653 m)  Wt 242 lb 3.2 oz (109.9 kg)  SpO2 100%  BMI 40.21 kg/m2  Body mass index is 40.21 kg/(m^2).  Positive Tinnel's in both wrists: very painful to tap  Range of motion of her fingers are good. No weakness in her fingers  She is morbidly obese. A diuretic may help to reduce some pressure in her wrists   Well hydrated  Well nourished  Well groomed  Alert and oriented X 3  Good spirits  No ankle edema        Diagnostic Test Results:  none     ASSESSMENT/PLAN:              1. Bilateral carpal tunnel syndrome  As above   - order for DME; Equipment being ordered: bilateral wrist splints with thumb  Dispense: 2 each; Refill: 0  - triamterene-hydrochlorothiazide (DYAZIDE) 37.5-25 MG per capsule; Take 1 capsule by mouth daily  Dispense: 90 capsule; Refill: 3  - NEEDLE EMG 2 EXTREMITIES    2. Primary insomnia  Good control   - traZODone (DESYREL) 50 MG tablet; Take 1 tablet (50 mg) by mouth nightly as needed for sleep  Dispense: 90 tablet; Refill: 4    3. Polymenorrhea  Good control   - norgestrel-ethinyl estradiol (LO/OVRAL) 0.3-30 MG-MCG per tablet; Take 1 tablet by mouth daily  Dispense: 84 tablet; Refill: 4    4. Anxiety  Good control   - FLUoxetine (PROZAC) 40 MG capsule; Take 1 capsule (40 mg) by mouth every " morning  Dispense: 90 capsule; Refill: 4  - ALPRAZolam (XANAX) 0.5 MG tablet; Take 1 tablet (0.5 mg) by mouth as needed for anxiety  Dispense: 10 tablet; Refill: 0    5. Morbid obesity (H)  As above, discussed   Encouraged [facility] weight loss diet and low salt      Return to clinic if no improvement     KRISTINA DUNLAP MD  [facility]

## 2018-04-26 ENCOUNTER — OFFICE VISIT (OUTPATIENT)
Dept: FAMILY MEDICINE | Facility: CLINIC | Age: 43
End: 2018-04-26
Payer: COMMERCIAL

## 2018-04-26 ENCOUNTER — TELEPHONE (OUTPATIENT)
Dept: NEUROLOGY | Facility: CLINIC | Age: 43
End: 2018-04-26

## 2018-04-26 VITALS
HEIGHT: 65 IN | BODY MASS INDEX: 40.35 KG/M2 | TEMPERATURE: 97 F | SYSTOLIC BLOOD PRESSURE: 130 MMHG | DIASTOLIC BLOOD PRESSURE: 60 MMHG | OXYGEN SATURATION: 100 % | HEART RATE: 76 BPM | RESPIRATION RATE: 12 BRPM | WEIGHT: 242.2 LBS

## 2018-04-26 DIAGNOSIS — N92.0 POLYMENORRHEA: ICD-10-CM

## 2018-04-26 DIAGNOSIS — F51.01 PRIMARY INSOMNIA: ICD-10-CM

## 2018-04-26 DIAGNOSIS — E66.01 MORBID OBESITY (H): ICD-10-CM

## 2018-04-26 DIAGNOSIS — F41.9 ANXIETY: ICD-10-CM

## 2018-04-26 DIAGNOSIS — G56.03 BILATERAL CARPAL TUNNEL SYNDROME: Primary | ICD-10-CM

## 2018-04-26 PROCEDURE — 99214 OFFICE O/P EST MOD 30 MIN: CPT | Performed by: FAMILY MEDICINE

## 2018-04-26 RX ORDER — TRAZODONE HYDROCHLORIDE 50 MG/1
50 TABLET, FILM COATED ORAL
Qty: 90 TABLET | Refills: 4 | Status: SHIPPED | OUTPATIENT
Start: 2018-04-26 | End: 2019-04-08

## 2018-04-26 RX ORDER — ALPRAZOLAM 0.5 MG
0.5 TABLET ORAL PRN
Qty: 10 TABLET | Refills: 0 | Status: SHIPPED | OUTPATIENT
Start: 2018-04-26 | End: 2019-04-08

## 2018-04-26 RX ORDER — TRIAMTERENE AND HYDROCHLOROTHIAZIDE 37.5; 25 MG/1; MG/1
1 CAPSULE ORAL DAILY
Qty: 90 CAPSULE | Refills: 3 | Status: SHIPPED | OUTPATIENT
Start: 2018-04-26 | End: 2019-04-08

## 2018-04-26 RX ORDER — FLUOXETINE 40 MG/1
40 CAPSULE ORAL EVERY MORNING
Qty: 90 CAPSULE | Refills: 4 | Status: SHIPPED | OUTPATIENT
Start: 2018-04-26 | End: 2019-04-08

## 2018-04-26 NOTE — TELEPHONE ENCOUNTER
Not sure what this is regarding. There is no documentation of us calling her. The only thing I can think of is her PCP is referring her for an EMG. Will ask for clarification from her PCP.   Sanam Gifford RN

## 2018-04-26 NOTE — MR AVS SNAPSHOT
After Visit Summary   4/26/2018    Megan Dowd    MRN: [id number]           Patient Information     Date Of Birth          1975        Visit Information        Provider Department      4/26/2018 3:00 PM Emilee Ulloa MD [facility]        Today's Diagnoses     Bilateral carpal tunnel syndrome    -  1    Primary insomnia        Polymenorrhea        Anxiety          Care Instructions    [facility]    If you have any questions regarding to your visit please contact your care team:       Team Purple:   Clinic Hours Telephone Number   Dr. Emilee Honeycutt   7am-7pm  Monday - Thursday   7am-5pm  Fridays  [phone]  (Appointment scheduling available 24/7)    Questions about your recent visit?   Team Line:  [phone]   Urgent Care - [place] and [facility] - 11am-9pm Monday-Friday Saturday-Sunday- 9am-5pm   [place] - 5pm-9pm Monday-Friday Saturday-Sunday- 9am-5pm  [phone] - [place]  [phone] [facility]       What options do I have for a visit other than an office visit? We offer electronic visits (e-visits) and telephone visits, when medically appropriate.  Please check with your medical insurance to see if these types of visits are covered, as you will be responsible for any charges that are not paid by your insurance.      You can use [employer] (secure electronic communication) to access to your chart, send your primary care provider a message, or make an appointment. Ask a team member how to get started.     For a price quote for your services, please call our Consumer Price Line at [phone] or our Imaging Cost estimation line at [phone] (for imaging tests).      Felisa Hill CMA          Follow-ups after your visit        Who to contact     If you have questions or need follow up information about today's clinic visit or your schedule please contact [facility]  "directly at [phone].  Normal or non-critical lab and imaging results will be communicated to you by [employer], letter or phone within 4 business days after the clinic has received the results. If you do not hear from us within 7 days, please contact the clinic through [employer] or phone. If you have a critical or abnormal lab result, we will notify you by phone as soon as possible.  Submit refill requests through [employer] or call your pharmacy and they will forward the refill request to us. Please allow 3 business days for your refill to be completed.          Additional Information About Your Visit        [employer] Information     [employer] gives you secure access to your electronic health record. If you see a primary care provider, you can also send messages to your care team and make appointments. If you have questions, please call your primary care clinic.  If you do not have a primary care provider, please call [phone] and they will assist you.        Care EveryWhere ID     This is your Care EveryWhere ID. This could be used by other organizations to access your [place] medical records  [id number]        Your Vitals Were     Pulse Temperature Respirations Height Pulse Oximetry BMI (Body Mass Index)    76 97  F (36.1  C) (Oral) 12 5' 5.08\" (1.653 m) 100% 40.21 kg/m2       Blood Pressure from Last 3 Encounters:   04/26/18 130/60   05/30/17 136/74   12/22/16 108/86    Weight from Last 3 Encounters:   04/26/18 242 lb 3.2 oz (109.9 kg)   05/30/17 228 lb (103.4 kg)   12/22/16 238 lb (108 kg)              We Performed the Following     NEEDLE EMG 2 EXTREMITIES          Today's Medication Changes          These changes are accurate as of 4/26/18  3:38 PM.  If you have any questions, ask your nurse or doctor.               Start taking these medicines.        Dose/Directions    order for DME   Used for:  Bilateral carpal tunnel syndrome   Started by:  Emilee Ulloa MD        Equipment being ordered: bilateral " wrist splints with thumb   Quantity:  2 each   Refills:  0       triamterene-hydrochlorothiazide 37.5-25 MG per capsule   Commonly known as:  DYAZIDE   Used for:  Bilateral carpal tunnel syndrome   Started by:  Emilee Ulloa MD        Dose:  1 capsule   Take 1 capsule by mouth daily   Quantity:  90 capsule   Refills:  3            Where to get your medicines      These medications were sent to [place] [id number] [place] [facility] [place] [id number] [place] [address]     Phone:  [phone]     FLUoxetine 40 MG capsule    norgestrel-ethinyl estradiol 0.3-30 MG-MCG per tablet    traZODone 50 MG tablet    triamterene-hydrochlorothiazide 37.5-25 MG per capsule         Some of these will need a paper prescription and others can be bought over the counter.  Ask your nurse if you have questions.     Bring a paper prescription for each of these medications     ALPRAZolam 0.5 MG tablet    order for DME                Primary Care Provider Office Phone # Fax #    Emilee Ulloa [phone]       [facility] [address] [facility] [address]        Equal Access to Services     MAXIME SCHMID AH: Hadii brody xiao hadasho Soanshulali, waaxda luqadaha, qaybta kaalmada adeegyada, solitario mcgill. So [facility] [phone].    ATENCIÓN: Si habla español, tiene a casey disposición servicios gratuitos de asistencia lingüística. [facility] [phone].    We comply with applicable federal civil rights laws and [place] laws. We do not discriminate on the basis of race, color, national origin, age, disability, sex, sexual orientation, or gender identity.            Thank you!     Thank you for choosing [facility]  for your care. Our goal is always to provide you with excellent care. Hearing back from our patients is one way we can continue to improve our services. Please take a few minutes to complete the written survey that you may  receive in the mail after your visit with us. Thank you!             Your Updated Medication List - Protect others around you: Learn how to safely use, store and throw away your medicines at www.disposemymeds.org.          This list is accurate as of 4/26/18  3:38 PM.  Always use your most recent med list.                   Brand Name Dispense Instructions for use Diagnosis    ALPRAZolam 0.5 MG tablet    XANAX    10 tablet    Take 1 tablet (0.5 mg) by mouth as needed for anxiety    Anxiety       FLUoxetine 40 MG capsule    PROZAC    90 capsule    Take 1 capsule (40 mg) by mouth every morning    Anxiety       norgestrel-ethinyl estradiol 0.3-30 MG-MCG per tablet    LO/OVRAL    84 tablet    Take 1 tablet by mouth daily    Polymenorrhea       omeprazole 20 MG CR capsule    priLOSEC    30 capsule    Take 1 capsule (20 mg) by mouth daily    RUQ abdominal pain       order for DME     2 each    Equipment being ordered: bilateral wrist splints with thumb    Bilateral carpal tunnel syndrome       traZODone 50 MG tablet    DESYREL    90 tablet    Take 1 tablet (50 mg) by mouth nightly as needed for sleep    Primary insomnia       triamterene-hydrochlorothiazide 37.5-25 MG per capsule    DYAZIDE    90 capsule    Take 1 capsule by mouth daily    Bilateral carpal tunnel syndrome       * varenicline 0.5 MG X 11 & 1 MG X 42 tablet    CHANTIX STARTING MONTH PORFIRIO    53 tablet    Take 0.5 mg tab daily for 3 days, then 0.5 mg tab twice daily for 4 days, then 1 mg twice daily.    Tobacco use disorder       * varenicline 1 MG tablet    CHANTIX    56 tablet    Take 1 tablet (1 mg) by mouth 2 times daily    Tobacco use disorder       * Notice:  This list has 2 medication(s) that are the same as other medications prescribed for you. Read the directions carefully, and ask your doctor or other care provider to review them with you.

## 2018-04-26 NOTE — PATIENT INSTRUCTIONS
[facility]    If you have any questions regarding to your visit please contact your care team:       Team Purple:   Clinic Hours Telephone Number   Dr. Emilee Honeycutt   7am-7pm  Monday - Thursday   7am-5pm  Fridays  [phone]  (Appointment scheduling available 24/7)    Questions about your recent visit?   Team Line:  [phone]   Urgent Care - [place] and [facility] - 11am-9pm Monday-Friday Saturday-Sunday- 9am-5pm   [place] - 5pm-9pm Monday-Friday Saturday-Sunday- 9am-5pm  [phone] - [place]  [phone] - [place]       What options do I have for a visit other than an office visit? We offer electronic visits (e-visits) and telephone visits, when medically appropriate.  Please check with your medical insurance to see if these types of visits are covered, as you will be responsible for any charges that are not paid by your insurance.      You can use [employer] (secure electronic communication) to access to your chart, send your primary care provider a message, or make an appointment. Ask a team member how to get started.     For a price quote for your services, please call our Consumer Price Line at [phone] or our Imaging Cost estimation line at [phone] (for imaging tests).      Felisa Hill, CMA

## 2018-05-21 ENCOUNTER — OFFICE VISIT (OUTPATIENT)
Dept: NEUROLOGY | Facility: CLINIC | Age: 43
End: 2018-05-21
Attending: FAMILY MEDICINE
Payer: COMMERCIAL

## 2018-05-21 DIAGNOSIS — G56.03 BILATERAL CARPAL TUNNEL SYNDROME: ICD-10-CM

## 2018-05-21 PROCEDURE — 95911 NRV CNDJ TEST 9-10 STUDIES: CPT | Performed by: PSYCHIATRY & NEUROLOGY

## 2018-05-21 NOTE — MR AVS SNAPSHOT
After Visit Summary   5/21/2018    Megan Dowd    MRN: [id number]           Patient Information     Date Of Birth          1975        Visit Information        Provider Department      5/21/2018 2:00 PM Stephen Olea MD [facility]        Today's Diagnoses     Bilateral carpal tunnel syndrome           Follow-ups after your visit        Who to contact     If you have questions or need follow up information about today's clinic visit or your schedule please contact [facility] directly at [phone].  Normal or non-critical lab and imaging results will be communicated to you by MyChart, letter or phone within 4 business days after the clinic has received the results. If you do not hear from us within 7 days, please contact the clinic through [employer] or phone. If you have a critical or abnormal lab result, we will notify you by phone as soon as possible.  Submit refill requests through [employer] or call your pharmacy and they will forward the refill request to us. Please allow 3 business days for your refill to be completed.          Additional Information About Your Visit        MyChart Information     [employer] gives you secure access to your electronic health record. If you see a primary care provider, you can also send messages to your care team and make appointments. If you have questions, please call your primary care clinic.  If you do not have a primary care provider, please call [phone] and they will assist you.      [employer] is an electronic gateway that provides easy, online access to your medical records. With [employer], you can request a clinic appointment, read your test results, renew a prescription or communicate with your care team.     To access your existing account, please contact your [facility] or call [phone] for assistance.        Care EveryWhere ID     This is your Care EveryWhere ID. This could be  used by other organizations to access your [place] medical records  [id number]         Blood Pressure from Last 3 Encounters:   04/26/18 130/60   05/30/17 136/74   12/22/16 108/86    Weight from Last 3 Encounters:   04/26/18 109.9 kg (242 lb 3.2 oz)   05/30/17 103.4 kg (228 lb)   12/22/16 108 kg (238 lb)              We Performed the Following     EMG     NCS Motor with or without F-Wave, 7-8 nerves ([id number])        Primary Care Provider Office Phone # Fax #    Emilee Ulloa [phone] [address] [facility] [address]        Equal Access to Services     MAXIME SCHMID : Hadii brody minoro Soluis felipe, waaxda luqadaha, qaybta kaalmada adeegyada, solitario mcgill. So [facility] [phone].    ATENCIÓN: Si habla español, tiene a casey disposición servicios gratuitos de asistencia lingüística. [facility] [phone].    We comply with applicable federal civil rights laws and [place] laws. We do not discriminate on the basis of race, color, national origin, age, disability, sex, sexual orientation, or gender identity.            Thank you!     Thank [facility]  for your care. Our goal is always to provide you with excellent care. Hearing back from our patients is one way we can continue to improve our services. Please take a few minutes to complete the written survey that you may receive in the mail after your visit with us. Thank you!             Your Updated Medication List - Protect others around you: Learn how to safely use, store and throw away your medicines at www.disposemymeds.org.          This list is accurate as of 5/21/18  4:00 PM.  Always use your most recent med list.                   Brand Name Dispense Instructions for use Diagnosis    ALPRAZolam 0.5 MG tablet    XANAX    10 tablet    Take 1 tablet (0.5 mg) by mouth as needed for anxiety    Anxiety       FLUoxetine 40 MG capsule    PROZAC    90 capsule     Take 1 capsule (40 mg) by mouth every morning    Anxiety       norgestrel-ethinyl estradiol 0.3-30 MG-MCG per tablet    LO/OVRAL    84 tablet    Take 1 tablet by mouth daily    Polymenorrhea       omeprazole 20 MG CR capsule    priLOSEC    30 capsule    Take 1 capsule (20 mg) by mouth daily    RUQ abdominal pain       order for DME     2 each    Equipment being ordered: bilateral wrist splints with thumb    Bilateral carpal tunnel syndrome       traZODone 50 MG tablet    DESYREL    90 tablet    Take 1 tablet (50 mg) by mouth nightly as needed for sleep    Primary insomnia       triamterene-hydrochlorothiazide 37.5-25 MG per capsule    DYAZIDE    90 capsule    Take 1 capsule by mouth daily    Bilateral carpal tunnel syndrome       * varenicline 0.5 MG X 11 & 1 MG X 42 tablet    CHANTIX STARTING MONTH PORFIRIO    53 tablet    Take 0.5 mg tab daily for 3 days, then 0.5 mg tab twice daily for 4 days, then 1 mg twice daily.    Tobacco use disorder       * varenicline 1 MG tablet    CHANTIX    56 tablet    Take 1 tablet (1 mg) by mouth 2 times daily    Tobacco use disorder       * Notice:  This list has 2 medication(s) that are the same as other medications prescribed for you. Read the directions carefully, and ask your doctor or other care provider to review them with you.

## 2018-05-21 NOTE — PROGRESS NOTES
[facility]      Nerve Conduction & EMG Report          Patient:       Megan Dowd  Patient ID:    [id number]  Gender:        Female  YOB: 1975  Age:           43 Years 3 Months      History and Examination:  Megan Dowd is a 43 year old woman with intermittent numbness in the first three digits of both hands. She is referred for evaluation of possible carpal tunnel syndrome.     Techniques:  Motor conduction studies were done with surface recording electrodes. Sensory conduction studies were performed with surface electrodes, unless indicated otherwise by (n), designating the use of subdermal recording electrodes. Temperature was monitored and recorded throughout the study. Upper extremities were maintained at a temperature of 32 degrees Centigrade or higher.      Results:  A left median sensory conduction study demonstrated mild conduction slowing in the transcarpal segment. A right median sensory conduction study demonstrated moderate conduction slowing, accentuated in the transcarpal segment. Ulnar sensory conduction studies were normal bilaterally. Bilateral median and ulnar motor conduction studies demonstrated normal absolute values throughout but relative median distal motor latency prolongation in the left median nerve to the abductor pollicis brevis and the right median nerve to the second lumbrical muscle.     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       Stephen Olea MD        Sensory NCS      Nerve / Sites Rec. Site Onset Peak NP Amp Ref. PP Amp Dist Ottoniel Ref. Temp     ms ms  V  V  V cm m/s m/s  C   L MEDIAN - Dig II      Dig II Wrist 2.86 3.65 11.2 10.0 12.5 14 48.9 48.0 32.6      Palm Wrist 1.88 2.55 20.3  20.1 8 42.7 48.0 32.5   R ULNAR - Dig V      Dig II Wrist 2.45 3.07 13.3 10.0 16.3 12.5 51.1  48.0 33.2      Palm Wrist 1.51 1.98 12.4  22.4 8 53.0 48.0 33.2   L ULNAR - Dig V      Dig V Wrist 2.29 2.97 9.0 8.0 9.3 12.5 54.5 48.0 33.3      Palm Wrist 1.30 1.98 14.4  28.8 8 61.4 48.0 33.1   R MEDIAN - Dig II      Dig V Wrist 3.59 4.43 10.3 8.0 8.1 14 39.0 48.0 33.3      Palm Wrist 2.24 2.86 13.0  47.1 8 35.7 48.0 33.2       Motor NCS      Nerve / Sites Rec. Site Lat Ref. Amp Ref. Rel Amp Dist Ottoniel Ref. Dur. Area Temp.     ms ms mV mV % cm m/s m/s ms %  C   L MEDIAN - APB      Wrist APB 3.80 4.40 7.3 5.0 100 8   6.82 100 34      Elbow APB 8.13  6.9  94.6 22.5 52.0 48.0 7.34 108 34   R MEDIAN - APB      Wrist APB 4.32 4.40 6.5 5.0 100 8   5.68 100 32.5      Elbow APB 8.65  6.0  92.1 22 50.9 48.0 5.89 90.2 32.2   L ULNAR - ADM      Wrist ADM 2.66 3.50 9.6 5.0 100 8   7.19 100 32.3      B.Elbow ADM 5.83  9.3  96.3 20 63.0 48.0 7.40 99.6 32.2      A.Elbow ADM 7.45  9.0  93.5 10 61.9 48.0 7.50 98.9 32.3   R ULNAR - ADM      Wrist ADM 2.60 3.50 12.0 5.0 100 8   6.61 100 33.4      B.Elbow ADM 5.47  11.5  95.4 17 59.3 48.0 6.88 95.8 33.2      A.Elbow ADM 7.14  11.4  94.7 10 60.0 48.0 6.82 95.9 33.3   L MEDIAN - II Lumb      Median II Lumb 3.70  1.6  100 10   6.15 100 32.6      Ulnar Palm Int 2.92  6.4  396 10   6.46 290 32

## 2018-05-21 NOTE — LETTER
[address] [place] [address]    May 22, 2018    Megan Dowd  [address] [facility] [address]      Dear Megan,    Your EMG testing was positive for bilateral carpal tunnel syndrome, the right hand being a little worse than your left. I have referred you to our orthopedics department. Please call [phone] to make an appointment. Please keep wearing your splints as much as possible. I wish you well.     Enclosed is a copy of your results.     Results for orders placed or performed in visit on 05/21/18   EMG    Impression    [facility]        Nerve Conduction & EMG Report            Patient:       Megan Dowd  Patient ID:    [id number]  Gender:        Female  YOB: 1975  Age:           43 Years 3 Months      History and Examination:  Megan Dowd is a 43 year old woman with intermittent numbness in the first three digits of both hands. She is referred for evaluation of possible carpal tunnel syndrome.      Techniques:  Motor conduction studies were done with surface recording electrodes. Sensory conduction studies were performed with surface electrodes, unless indicated otherwise by (n), designating the use of subdermal recording electrodes. Temperature was monitored and recorded throughout the study. Upper extremities were maintained at a temperature of 32 degrees Centigrade or higher.       Results:  A left median sensory conduction study demonstrated mild conduction slowing in the transcarpal segment. A right median sensory conduction study demonstrated moderate conduction slowing, accentuated in the transcarpal segment. Ulnar sensory conduction studies were normal bilaterally. Bilateral median and ulnar motor conduction studies demonstrated normal absolute values throughout but relative median distal motor latency prolongation in the left median  nerve to the abductor pollicis brevis and the right median nerve to the second lumbrical muscle.      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         Stephen Olea MD       If you have any questions or concerns, please call myself or my nurse at [phone].    Sincerely,    Megan Ulloa MD/isabel

## 2018-05-21 NOTE — LETTER
5/21/2018         RE: Megan Dowd  [address] [facility] [address]        Dear Colleague,    Thank you for referring your patient, Megan Dowd, to the [facility]. Please see a copy of my visit note below.    [facility]      Nerve Conduction & EMG Report          Patient:       Megan Dowd  Patient ID:    [id number]  Gender:        Female  YOB: 1975  Age:           43 Years 3 Months      History and Examination:  Megan Dowd is a 43 year old woman with intermittent numbness in the first three digits of both hands. She is referred for evaluation of possible carpal tunnel syndrome.     Techniques:  Motor conduction studies were done with surface recording electrodes. Sensory conduction studies were performed with surface electrodes, unless indicated otherwise by (n), designating the use of subdermal recording electrodes. Temperature was monitored and recorded throughout the study. Upper extremities were maintained at a temperature of 32 degrees Centigrade or higher.      Results:  A left median sensory conduction study demonstrated mild conduction slowing in the transcarpal segment. A right median sensory conduction study demonstrated moderate conduction slowing, accentuated in the transcarpal segment. Ulnar sensory conduction studies were normal bilaterally. Bilateral median and ulnar motor conduction studies demonstrated normal absolute values throughout but relative median distal motor latency prolongation in the left median nerve to the abductor pollicis brevis and the right median nerve to the second lumbrical muscle.     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       Stephen Olea MD        Sensory NCS      Nerve / Sites Rec. Site Onset Peak NP Amp Ref. PP Amp Dist Ottoniel  Ref. Temp     ms ms  V  V  V cm m/s m/s  C   L MEDIAN - Dig II      Dig II Wrist 2.86 3.65 11.2 10.0 12.5 14 48.9 48.0 32.6      Palm Wrist 1.88 2.55 20.3  20.1 8 42.7 48.0 32.5   R ULNAR - Dig V      Dig II Wrist 2.45 3.07 13.3 10.0 16.3 12.5 51.1 48.0 33.2      Palm Wrist 1.51 1.98 12.4  22.4 8 53.0 48.0 33.2   L ULNAR - Dig V      Dig V Wrist 2.29 2.97 9.0 8.0 9.3 12.5 54.5 48.0 33.3      Palm Wrist 1.30 1.98 14.4  28.8 8 61.4 48.0 33.1   R MEDIAN - Dig II      Dig V Wrist 3.59 4.43 10.3 8.0 8.1 14 39.0 48.0 33.3      Palm Wrist 2.24 2.86 13.0  47.1 8 35.7 48.0 33.2       Motor NCS      Nerve / Sites Rec. Site Lat Ref. Amp Ref. Rel Amp Dist Ottoniel Ref. Dur. Area Temp.     ms ms mV mV % cm m/s m/s ms %  C   L MEDIAN - APB      Wrist APB 3.80 4.40 7.3 5.0 100 8   6.82 100 34      Elbow APB 8.13  6.9  94.6 22.5 52.0 48.0 7.34 108 34   R MEDIAN - APB      Wrist APB 4.32 4.40 6.5 5.0 100 8   5.68 100 32.5      Elbow APB 8.65  6.0  92.1 22 50.9 48.0 5.89 90.2 32.2   L ULNAR - ADM      Wrist ADM 2.66 3.50 9.6 5.0 100 8   7.19 100 32.3      B.Elbow ADM 5.83  9.3  96.3 20 63.0 48.0 7.40 99.6 32.2      A.Elbow ADM 7.45  9.0  93.5 10 61.9 48.0 7.50 98.9 32.3   R ULNAR - ADM      Wrist ADM 2.60 3.50 12.0 5.0 100 8   6.61 100 33.4      B.Elbow ADM 5.47  11.5  95.4 17 59.3 48.0 6.88 95.8 33.2      A.Elbow ADM 7.14  11.4  94.7 10 60.0 48.0 6.82 95.9 33.3   L MEDIAN - II Lumb      Median II Lumb 3.70  1.6  100 10   6.15 100 32.6      Ulnar Palm Int 2.92  6.4  396 10   6.46 290 32                                Again, thank you for allowing me to participate in the care of your patient.        Sincerely,        Stephen Olea MD

## 2018-05-25 ENCOUNTER — OFFICE VISIT (OUTPATIENT)
Dept: ORTHOPEDICS | Facility: CLINIC | Age: 43
End: 2018-05-25
Payer: COMMERCIAL

## 2018-05-25 VITALS
HEART RATE: 75 BPM | DIASTOLIC BLOOD PRESSURE: 85 MMHG | WEIGHT: 241 LBS | HEIGHT: 65 IN | SYSTOLIC BLOOD PRESSURE: 125 MMHG | BODY MASS INDEX: 40.15 KG/M2

## 2018-05-25 DIAGNOSIS — G56.03 BILATERAL CARPAL TUNNEL SYNDROME: Primary | ICD-10-CM

## 2018-05-25 PROCEDURE — 99244 OFF/OP CNSLTJ NEW/EST MOD 40: CPT | Performed by: ORTHOPAEDIC SURGERY

## 2018-05-25 ASSESSMENT — PAIN SCALES - GENERAL: PAINLEVEL: SEVERE PAIN (7)

## 2018-05-25 NOTE — MR AVS SNAPSHOT
"              After Visit Summary   5/25/2018    Megan Dowd    MRN: [id number]           Patient Information     Date Of Birth          1975        Visit Information        Provider Department      5/25/2018 8:00 AM Moe Castro MD [facility]        Today's Diagnoses     Bilateral carpal tunnel syndrome    -  1       Follow-ups after your visit        Follow-up notes from your care team     Return for Postop Visit.      Who to contact     If you have questions or need follow up information about today's clinic visit or your schedule please contact [facility] directly at [phone].  Normal or non-critical lab and imaging results will be communicated to you by Docuratedhart, letter or phone within 4 business days after the clinic has received the results. If you do not hear from us within 7 days, please contact the clinic through Docuratedhart or phone. If you have a critical or abnormal lab result, we will notify you by phone as soon as possible.  Submit refill requests through Protagen or call your pharmacy and they will forward the refill request to us. Please allow 3 business days for your refill to be completed.          Additional Information About Your Visit        MyChart Information     Protagen gives you secure access to your electronic health record. If you see a primary care provider, you can also send messages to your care team and make appointments. If you have questions, please call your primary care clinic.  If you do not have a primary care provider, please call [phone] and they will assist you.        Care EveryWhere ID     This is your Care EveryWhere ID. This could be used by other organizations to access your [facility] records  [id number]        Your Vitals Were     Pulse Height BMI (Body Mass Index)             75 5' 5\" (1.651 m) 40.1 kg/m2          Blood Pressure from Last 3 Encounters:   05/25/18 125/85   04/26/18 130/60   05/30/17 136/74    " Weight from Last 3 Encounters:   05/25/18 241 lb (109.3 kg)   04/26/18 242 lb 3.2 oz (109.9 kg)   05/30/17 228 lb (103.4 kg)              We Performed the Following     Melody-Operative Worksheet        Primary Care Provider Office Phone # Fax #    Emilee Ulloa [phone] [address] [facility] [id number]        Equal Access to Services     MAGAN SCHMID : Hadii aad ku hadasho Soomaali, waaxda luqadaha, qaybta kaalmada adeegyada, waxay idiin hayaan adeeg kharash larobbie . So [facility] [place] [phone].    ATENCIÓN: Si habla español, tiene a casey disposición servicios gratuitos de asistencia lingüística. [facility] [phone].    We comply with applicable federal civil rights laws and [place] laws. We do not discriminate on the basis of race, color, national origin, age, disability, sex, sexual orientation, or gender identity.            Thank you!     Thank you for choosing [facility]  for your care. Our goal is always to provide you with excellent care. Hearing back from our patients is one way we can continue to improve our services. Please take a few minutes to complete the written survey that you may receive in the mail after your visit with us. Thank you!             Your Updated Medication List - Protect others around you: Learn how to safely use, store and throw away your medicines at www.disposemymeds.org.          This list is accurate as of 5/25/18 11:59 PM.  Always use your most recent med list.                   Brand Name Dispense Instructions for use Diagnosis    ALPRAZolam 0.5 MG tablet    XANAX    10 tablet    Take 1 tablet (0.5 mg) by mouth as needed for anxiety    Anxiety       FLUoxetine 40 MG capsule    PROZAC    90 capsule    Take 1 capsule (40 mg) by mouth every morning    Anxiety       norgestrel-ethinyl estradiol 0.3-30 MG-MCG per tablet    LO/OVRAL    84 tablet    Take 1 tablet by mouth daily    Polymenorrhea       omeprazole 20 MG CR capsule     priLOSEC    30 capsule    Take 1 capsule (20 mg) by mouth daily    RUQ abdominal pain       order for DME     2 each    Equipment being ordered: bilateral wrist splints with thumb    Bilateral carpal tunnel syndrome       traZODone 50 MG tablet    DESYREL    90 tablet    Take 1 tablet (50 mg) by mouth nightly as needed for sleep    Primary insomnia       triamterene-hydrochlorothiazide 37.5-25 MG per capsule    DYAZIDE    90 capsule    Take 1 capsule by mouth daily    Bilateral carpal tunnel syndrome       * varenicline 0.5 MG X 11 & 1 MG X 42 tablet    CHANTIX STARTING MONTH PORFIRIO    53 tablet    Take 0.5 mg tab daily for 3 days, then 0.5 mg tab twice daily for 4 days, then 1 mg twice daily.    Tobacco use disorder       * varenicline 1 MG tablet    CHANTIX    56 tablet    Take 1 tablet (1 mg) by mouth 2 times daily    Tobacco use disorder       * Notice:  This list has 2 medication(s) that are the same as other medications prescribed for you. Read the directions carefully, and ask your doctor or other care provider to review them with you.

## 2018-05-25 NOTE — PROGRESS NOTES
CHIEF COMPLAINT:   Chief Complaint   Patient presents with     Cts     Bilateral hand N/T. Onset: years, but has gotten really bad since 7/1/17. Patient has intermittent N/T in first three digits of bilateral hands. Patient wears wrist braces at night that help but once she starts using her hands during the day the N/T return. No injections. She would like to discuss surgery to fix it. She will have pain in bilateral base of thumbs.      Megan Dowd is seen today in the [facility] for evaluation of bilateral carpal tunnel syndrome at the request of Emilee Wisdom          HISTORY:  Megan Dowd is a 43 year old female , right -hand dominant who is seen in for bilateral hand pain, numbness and tingling that started years ago, worsening 7/1/2017. R>L. Symptoms affect the radial three fingers of both hands. She has severe pain today, rated a 7/10. She has pain during the day with use. At night, her hands fall asleep with throbbing sensations. She was given splints to wear. These splints help at night, but with no relief during the day. She has not had any other treatments. Denies neck problems. Also pain in the thumbs, palms.      Suspected cause: Due to unknown activities.    Pain severity: 7/10  Pain quality: aching and throbbing  Frequency of symptoms: frequently.  Aggravating Factors: with use, at night.  Relieving Factors: at rest, with wrist splints.  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  Patient Active Problem List    Diagnosis Date Noted     Polymenorrhea 06/26/2013     Priority: Medium     Dysmenorrhea 06/26/2013     Priority: Medium     Ovarian cyst 06/26/2013     Priority: Medium      Family history of diabetes mellitus 06/15/2011     Priority: Medium     CARDIOVASCULAR SCREENING; LDL GOAL LESS THAN 160 10/31/2010     Priority: Medium     Insomnia      Priority: Medium     Anxiety      Priority: Medium     IBS (irritable bowel syndrome)      Priority: Medium       Surgical Hx:  has a past surgical history that includes tubal ligation (4/07); surgical history of -  (3/13); and Breast surgery (3/2013).    Medications:   Current Outpatient Prescriptions:      ALPRAZolam (XANAX) 0.5 MG tablet, Take 1 tablet (0.5 mg) by mouth as needed for anxiety, Disp: 10 tablet, Rfl: 0     FLUoxetine (PROZAC) 40 MG capsule, Take 1 capsule (40 mg) by mouth every morning, Disp: 90 capsule, Rfl: 4     norgestrel-ethinyl estradiol (LO/OVRAL) 0.3-30 MG-MCG per tablet, Take 1 tablet by mouth daily, Disp: 84 tablet, Rfl: 4     omeprazole (PRILOSEC) 20 MG CR capsule, Take 1 capsule (20 mg) by mouth daily, Disp: 30 capsule, Rfl: 2     order for DME, Equipment being ordered: bilateral wrist splints with thumb, Disp: 2 each, Rfl: 0     traZODone (DESYREL) 50 MG tablet, Take 1 tablet (50 mg) by mouth nightly as needed for sleep, Disp: 90 tablet, Rfl: 4     triamterene-hydrochlorothiazide (DYAZIDE) 37.5-25 MG per capsule, Take 1 capsule by mouth daily, Disp: 90 capsule, Rfl: 3     varenicline (CHANTIX STARTING MONTH PAK) 0.5 MG X 11 & 1 MG X 42 tablet, Take 0.5 mg tab daily for 3 days, then 0.5 mg tab twice daily for 4 days, then 1 mg twice daily., Disp: 53 tablet, Rfl: 0     varenicline (CHANTIX) 1 MG tablet, Take 1 tablet (1 mg) by mouth 2 times daily, Disp: 56 tablet, Rfl: 2    Allergies:   Allergies   Allergen Reactions     Lexapro      Increased anxiety       Zoloft      NIGHT SWEATS AND RASH FROM SWEATING AND INCREASE IN WEIGHT       Social Hx: .  reports that she quit smoking about 10 months ago. Her smoking use included Cigarettes. She has a 5.40 pack-year smoking history. She has never used smokeless  "tobacco. She reports that she drinks alcohol. She reports that she does not use illicit drugs.    Family Hx: family history includes CANCER in her paternal grandfather and paternal grandmother; DIABETES in her father; Gynecology in her maternal grandmother, sister, and sister; HEART DISEASE in her maternal grandmother; Hypertension in her father and mother; Unknown/Adopted in her maternal grandfather.. Negative for bleeding/clotting disorders. Negative for adverse anesthesia reactions.    REVIEW OF SYSTEMS: 10 point ROS neg other than the symptoms noted above in the HPI and PMH. Notables include  CONSTITUTIONAL:NEGATIVE for fever, chills, change in weight  INTEGUMENTARY/SKIN: NEGATIVE for worrisome rashes, moles or lesions  MUSCULOSKELETAL:See HPI above  Neurology: see HPI above.      EXAM:  /85  Pulse 75  Ht 1.651 m (5' 5\")  Wt 109.3 kg (241 lb)  BMI 40.1 kg/m2  GENERAL APPEARANCE: healthy, alert and no distress   GAIT: NORMAL  SKIN: no suspicious lesions or rashes  RESPIRATORY: No increased work of breathing.  NEURO:     strength: normal,    thenar fasiculations: negative    Thenar atrophy: none.    Sensation intact in all digits but decreased radial 3 digits notably on the right more than the left,    reflexes normal in upper extremities.   PSYCH:  mentation appears normal and affect normal, not anxious.    MUSCULOSKELETAL:    RIGHT HAND/FINGERS:    Skin intact. No abnormal skin discoloration, erythema or ecchymosis.   No nail pitting or clubbing.  Normal wear pattern, color and tone.  No observable or palpable masses of the fingers or palm or wrist.  No palpable triggering of fingers.   No observable or palpable cords or nodules of the fingers or palm.    There is no swelling in the wrist, hand, forearm.  There is moderate tenderness in the wrist, thenar eminence, 1st carpometacarpal joint   There is no ecchymosis.  There is no erythema of the surrounding skin.  There is no maceration of the " skin.  There is no deformity in the area.  Intact extensors. No extensor lag.    Special tests wrist:    Tinel's positive,    Phalen's positive    Flexion/compression test positive.     Special tests medial elbow ulnar nerve:    Tinel's negative,     Special tests median nerve proximal forearm:    Tinel's negative.    1st carpometacarpal grind: positive     Intact sensation light touch median, radial, ulnar nerves of the hand  Intact sensation to the radial and ulnar digital nerves of the fingers, as well as the finger tips.  Intact epl fpl fdp edc wrist flexion/extension biceps/triceps deltoid  Brisk capillary refill to all fingers.   Palpable radial pulse, 2+.      LEFT HAND/FINGERS:    Skin intact. No abnormal skin discoloration, erythema or ecchymosis.   No nail pitting or clubbing.  Normal wear pattern, color and tone.  No observable or palpable masses of the fingers or palm or wrist.  No palpable triggering of fingers.   No observable or palpable cords or nodules of the fingers or palm.    There is no swelling in the wrist, hand, forearm.  There is mild tenderness in the wrist, thenar eminence, carpometacarpal joint   There is no ecchymosis.  There is no erythema of the surrounding skin.  There is no maceration of the skin.  There is no deformity in the area.  Intact extensors. No extensor lag.    Special tests wrist:    Tinel's positive,    Phalen's positive    Flexion/compression test positive.     Special tests medial elbow ulnar nerve:    Tinel's negative,     Special tests median nerve proximal forearm:    Tinel's negative.    1st carpometacarpal grind: positive     Intact sensation light touch median, radial, ulnar nerves of the hand  Intact sensation to the radial and ulnar digital nerves of the fingers, as well as the finger tips.  Intact epl fpl fdp edc wrist flexion/extension biceps/triceps deltoid  Brisk capillary refill to all fingers.   Palpable radial pulse, 2+.      XRAYS: none  indicated.    SPECIAL STUDIES:  EMG done on 5/21/2018 bilateral wrists, Dr. Olea, [facility].  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        ASSESSMENT: 43  Year old female with bilateral carpal tunnel syndrome     PLAN:    * discussed with patient signs and symptoms consistent with carpal tunnel syndrome. Carpal tunnel syndrome is compression or pinching of the median nerve at the wrist, as it enters the hand. There are many different causes, and in most cases, multifactorial.    * An indepth discussion was had with her about the options for treatment, which included activity modification to avoid aggravating activities, taking breaks during activities that cause symptoms, stretching, NSAIDS to help decrease inflammation and swelling within the carpal tunnel, night splinting, corticosteroid injections, and carpal tunnel release.   * depending upon severity and duration of symptoms, nonoperative treatment is usually initiated, starting with least invasive modalities such as activity modification and a trial of night splints and NSAIDs.  * Cortisone injections are considered to decrease swelling and inflammation within the carpal tunnel and compression of the nerve.   * Lastly, carpal tunnel release should symptoms persist despite trial of nonoperative treatment, or in cases of severe carpal tunnel syndrome.  * risks of surgery, including, but not limited to: bleeding, infection, pain, scar, damage to adjacent structures (nerves, vessels, tendons), temporary versus permanent nerve injury, failure to relieve symptoms, recurrence of symptoms, incomplete release, stiffness, scar sensitivity and tenderness, need for further surgery, risks of anesthesia were discussed.  * in some cases, with severe, prolonged symptoms, or in situations of underlying peripheral  neuropathy, there may be permanent nerve changes not amenable to surgery, that even with surgery, may not resolve.      * plan: bilateral open carpal tunnel release, staged, right first, outpatient procedure, MAC with local anesthesia.  * will schedule in future at a mutual convenience  * patient to obtain H+P prior to surgery  * return to clinic 2 weeks postoperative for wound check, suture removal, sooner if needed..  * all patient's questions addressed and answered today.    The information in this document, created by a scribe for me, accurately reflects the services I personally performed and the decisions made by me. I have reviewed and approved this document for accuracy.        Moe Castro M.D., M.S.  Dept. of Orthopaedic Surgery  [facility]

## 2018-05-25 NOTE — LETTER
5/25/2018         RE: Megan Dowd  [address] [facility] [address]        Dear Colleague,    Thank you for referring your patient, Megan Dowd, to the [facility]. Please see a copy of my visit note below.    CHIEF COMPLAINT:   Chief Complaint   Patient presents with     Cts     Bilateral hand N/T. Onset: years, but has gotten really bad since 7/1/17. Patient has intermittent N/T in first three digits of bilateral hands. Patient wears wrist braces at night that help but once she starts using her hands during the day the N/T return. No injections. She would like to discuss surgery to fix it. She will have pain in bilateral base of thumbs.      Megan Dowd is seen today in the [facility] for evaluation of bilateral carpal tunnel syndrome at the request of Emilee Wisdom          HISTORY:  Megan Dowd is a 43 year old female , right -hand dominant who is seen in for bilateral hand pain, numbness and tingling that started years ago, worsening 7/1/2017. R>L. Symptoms affect the radial three fingers of both hands. She has severe pain today, rated a 7/10. She has pain during the day with use. At night, her hands fall asleep with throbbing sensations. She was given splints to wear. These splints help at night, but with no relief during the day. She has not had any other treatments. Denies neck problems. Also pain in the thumbs, palms.      Suspected cause: Due to unknown activities.    Pain severity: 7/10  Pain quality: aching and throbbing  Frequency of symptoms: frequently.  Aggravating Factors: with use, at night.  Relieving Factors: at rest, with wrist splints.  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  Patient Active Problem List    Diagnosis Date Noted     Polymenorrhea 06/26/2013     Priority: Medium     Dysmenorrhea 06/26/2013     Priority: Medium     Ovarian cyst 06/26/2013     Priority: Medium     Family history of diabetes mellitus 06/15/2011     Priority: Medium     CARDIOVASCULAR SCREENING; LDL GOAL LESS THAN 160 10/31/2010     Priority: Medium     Insomnia      Priority: Medium     Anxiety      Priority: Medium     IBS (irritable bowel syndrome)      Priority: Medium       Surgical Hx:  has a past surgical history that includes tubal ligation (4/07); surgical history of -  (3/13); and Breast surgery (3/2013).    Medications:   Current Outpatient Prescriptions:      ALPRAZolam (XANAX) 0.5 MG tablet, Take 1 tablet (0.5 mg) by mouth as needed for anxiety, Disp: 10 tablet, Rfl: 0     FLUoxetine (PROZAC) 40 MG capsule, Take 1 capsule (40 mg) by mouth every morning, Disp: 90 capsule, Rfl: 4     norgestrel-ethinyl estradiol (LO/OVRAL) 0.3-30 MG-MCG per tablet, Take 1 tablet by mouth daily, Disp: 84 tablet, Rfl: 4     omeprazole (PRILOSEC) 20 MG CR capsule, Take 1 capsule (20 mg) by mouth daily, Disp: 30 capsule, Rfl: 2     order for DME, Equipment being ordered: bilateral wrist splints with thumb, Disp: 2 each, Rfl: 0     traZODone (DESYREL) 50 MG tablet, Take 1 tablet (50 mg) by mouth nightly as needed for sleep, Disp: 90 tablet, Rfl: 4     triamterene-hydrochlorothiazide (DYAZIDE) 37.5-25 MG per capsule, Take 1 capsule by mouth daily, Disp: 90 capsule, Rfl: 3     varenicline (CHANTIX STARTING MONTH PAK) 0.5 MG X 11 & 1 MG X 42 tablet, Take 0.5 mg tab daily for 3 days, then 0.5 mg tab twice daily for 4 days, then 1 mg twice daily., Disp: 53 tablet, Rfl: 0     varenicline (CHANTIX) 1 MG tablet, Take 1 tablet (1 mg) by mouth 2 times daily, Disp: 56 tablet, Rfl: 2    Allergies:   Allergies   Allergen Reactions     Lexapro      Increased anxiety       Zoloft      NIGHT  "SWEATS AND RASH FROM SWEATING AND INCREASE IN WEIGHT       Social Hx: .  reports that she quit smoking about 10 months ago. Her smoking use included Cigarettes. She has a 5.40 pack-year smoking history. She has never used smokeless tobacco. She reports that she drinks alcohol. She reports that she does not use illicit drugs.    Family Hx: family history includes CANCER in her paternal grandfather and paternal grandmother; DIABETES in her father; Gynecology in her maternal grandmother, sister, and sister; HEART DISEASE in her maternal grandmother; Hypertension in her father and mother; Unknown/Adopted in her maternal grandfather.. Negative for bleeding/clotting disorders. Negative for adverse anesthesia reactions.    REVIEW OF SYSTEMS: 10 point ROS neg other than the symptoms noted above in the HPI and PMH. Notables include  CONSTITUTIONAL:NEGATIVE for fever, chills, change in weight  INTEGUMENTARY/SKIN: NEGATIVE for worrisome rashes, moles or lesions  MUSCULOSKELETAL:See HPI above  Neurology: see HPI above.      EXAM:  /85  Pulse 75  Ht 1.651 m (5' 5\")  Wt 109.3 kg (241 lb)  BMI 40.1 kg/m2  GENERAL APPEARANCE: healthy, alert and no distress   GAIT: NORMAL  SKIN: no suspicious lesions or rashes  RESPIRATORY: No increased work of breathing.  NEURO:     strength: normal,    thenar fasiculations: negative    Thenar atrophy: none.    Sensation intact in all digits but decreased radial 3 digits notably on the right more than the left,    reflexes normal in upper extremities.   PSYCH:  mentation appears normal and affect normal, not anxious.    MUSCULOSKELETAL:    RIGHT HAND/FINGERS:    Skin intact. No abnormal skin discoloration, erythema or ecchymosis.   No nail pitting or clubbing.  Normal wear pattern, color and tone.  No observable or palpable masses of the fingers or palm or wrist.  No palpable triggering of fingers.   No observable or palpable cords or nodules of the fingers or " palm.    There is no swelling in the wrist, hand, forearm.  There is moderate tenderness in the wrist, thenar eminence, 1st carpometacarpal joint   There is no ecchymosis.  There is no erythema of the surrounding skin.  There is no maceration of the skin.  There is no deformity in the area.  Intact extensors. No extensor lag.    Special tests wrist:    Tinel's positive,    Phalen's positive    Flexion/compression test positive.     Special tests medial elbow ulnar nerve:    Tinel's negative,     Special tests median nerve proximal forearm:    Tinel's negative.    1st carpometacarpal grind: positive     Intact sensation light touch median, radial, ulnar nerves of the hand  Intact sensation to the radial and ulnar digital nerves of the fingers, as well as the finger tips.  Intact epl fpl fdp edc wrist flexion/extension biceps/triceps deltoid  Brisk capillary refill to all fingers.   Palpable radial pulse, 2+.      LEFT HAND/FINGERS:    Skin intact. No abnormal skin discoloration, erythema or ecchymosis.   No nail pitting or clubbing.  Normal wear pattern, color and tone.  No observable or palpable masses of the fingers or palm or wrist.  No palpable triggering of fingers.   No observable or palpable cords or nodules of the fingers or palm.    There is no swelling in the wrist, hand, forearm.  There is mild tenderness in the wrist, thenar eminence, carpometacarpal joint   There is no ecchymosis.  There is no erythema of the surrounding skin.  There is no maceration of the skin.  There is no deformity in the area.  Intact extensors. No extensor lag.    Special tests wrist:    Tinel's positive,    Phalen's positive    Flexion/compression test positive.     Special tests medial elbow ulnar nerve:    Tinel's negative,     Special tests median nerve proximal forearm:    Tinel's negative.    1st carpometacarpal grind: positive     Intact sensation light touch median, radial, ulnar nerves of the hand  Intact sensation to the  radial and ulnar digital nerves of the fingers, as well as the finger tips.  Intact epl fpl fdp edc wrist flexion/extension biceps/triceps deltoid  Brisk capillary refill to all fingers.   Palpable radial pulse, 2+.      XRAYS: none indicated.    SPECIAL STUDIES:  EMG done on 5/21/2018 bilateral wrists, Dr. Olea, [facility].  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        ASSESSMENT: 43  Year old female with bilateral carpal tunnel syndrome     PLAN:    * discussed with patient signs and symptoms consistent with carpal tunnel syndrome. Carpal tunnel syndrome is compression or pinching of the median nerve at the wrist, as it enters the hand. There are many different causes, and in most cases, multifactorial.    * An indepth discussion was had with her about the options for treatment, which included activity modification to avoid aggravating activities, taking breaks during activities that cause symptoms, stretching, NSAIDS to help decrease inflammation and swelling within the carpal tunnel, night splinting, corticosteroid injections, and carpal tunnel release.   * depending upon severity and duration of symptoms, nonoperative treatment is usually initiated, starting with least invasive modalities such as activity modification and a trial of night splints and NSAIDs.  * Cortisone injections are considered to decrease swelling and inflammation within the carpal tunnel and compression of the nerve.   * Lastly, carpal tunnel release should symptoms persist despite trial of nonoperative treatment, or in cases of severe carpal tunnel syndrome.  * risks of surgery, including, but not limited to: bleeding, infection, pain, scar, damage to adjacent structures (nerves, vessels, tendons), temporary versus permanent nerve injury, failure to relieve symptoms, recurrence of  symptoms, incomplete release, stiffness, scar sensitivity and tenderness, need for further surgery, risks of anesthesia were discussed.  * in some cases, with severe, prolonged symptoms, or in situations of underlying peripheral neuropathy, there may be permanent nerve changes not amenable to surgery, that even with surgery, may not resolve.      * plan: bilateral open carpal tunnel release, staged, right first, outpatient procedure, MAC with local anesthesia.  * will schedule in future at a mutual convenience  * patient to obtain H+P prior to surgery  * return to clinic 2 weeks postoperative for wound check, suture removal, sooner if needed..  * all patient's questions addressed and answered today.    The information in this document, created by a scribe for me, accurately reflects the services I personally performed and the decisions made by me. I have reviewed and approved this document for accuracy.        Moe Castro M.D., M.S.  Dept. of Orthopaedic Surgery  [facility]      Again, thank you for allowing me to participate in the care of your patient.        Sincerely,        Moe Castro MD

## 2018-06-04 ENCOUNTER — TELEPHONE (OUTPATIENT)
Dept: ORTHOPEDICS | Facility: CLINIC | Age: 43
End: 2018-06-04

## 2018-06-04 NOTE — TELEPHONE ENCOUNTER
Patient is wanting to do surgery on 6-14-18 but states that she is flying out to [employer] on 6-16-18. Patient is wanting to know if surgery will be ok or if she should push it out. Please call to discuss. Thank you.

## 2018-06-04 NOTE — TELEPHONE ENCOUNTER
Type of surgery: STAGED CARPAL TUNNEL RELEASE,STARTING WITH RIGHT SIDE AND LEFT 3 WEEKS LATER : CPT 90510  Bilateral carpal tunnel syndrome [G56.03]  - Primary   Location of surgery: [facility]  Date and time of surgery:RIGHT- 06/21/18 / TBD -  LEFT  07/12/2018   Surgeon: CIRA ACOSTA  Pre-Op Appt Date: 06/15/2018  Post-Op Appt Date: 07/11/2018 -RIGHT / 07/25/2018-LEFT   Packet sent out: Yes  Pre-cert/Authorization completed:  No prior auth required per [employer] list.   Date: 06/04/2018

## 2018-06-04 NOTE — TELEPHONE ENCOUNTER
Called and let patient know that she should push out surgery. She understood and will connect with the scheduling line. She thanked me for calling.  Karen Parker Certified Medical Assistant

## 2018-06-05 NOTE — PROGRESS NOTES
[facility]  [address] [place] [id number]  [phone]  Dept: [phone]    PRE-OP EVALUATION:  Today's date: 6/15/2018    Megan Dowd (: 1975) presents for pre-operative evaluation assessment as requested by Dr. Moe Castro.  She requires evaluation and anesthesia risk assessment prior to undergoing surgery/procedure for treatment of Right Carpal Tunnel Release .    Fax number for surgical facility: [phone]  Primary Physician: Emilee Ulloa  Type of Anesthesia Anticipated: General    Patient has a Health Care Directive or Living Will:  YES     Preop Questions 6/15/2018   Who is doing your surgery? dr castro   What are you having done? [facility]   Date of Surgery/Procedure:    1.  Do you have a history of Heart attack, stroke, stent, coronary bypass surgery, or other heart surgery? No   2.  Do you ever have any pain or discomfort in your chest? No   3.  Do you have a history of  Heart Failure? No   4.   Are you troubled by shortness of breath when:  walking on a level surface, or up a slight hill, or at night? No   5.  Do you currently have a cold, bronchitis or other respiratory infection? No   6.  Do you have a cough, shortness of breath, or wheezing? No   7.  Do you sometimes get pains in the calves of your legs when you walk? No   8. Do you or anyone in your family have previous history of blood clots? No   9.  Do you or does anyone in your family have a serious bleeding problem such as prolonged bleeding following surgeries or cuts? No   10. Have you ever had problems with anemia or been told to take iron pills? No   11. Have you had any abnormal blood loss such as black, tarry or bloody stools, or abnormal vaginal bleeding? No   12. Have you ever had a blood transfusion? No   13. Have you or any of your relatives ever had problems with anesthesia? YES - nausea for family members   14. Do you have sleep apnea, excessive  snoring or daytime drowsiness? No   15. Do you have any prosthetic heart valves? No   16. Do you have prosthetic joints? No   17. Is there any chance that you may be pregnant? No         HPI:  Patient has right hand pain, weakness, and numbness in right hand from carpal tunnel syndrome.      HPI related to upcoming procedure: patient needs surgery for right hand carpal tunnel syndrome.       Has GERD and anxiety     MEDICAL HISTORY:     Patient Active Problem List    Diagnosis Date Noted     Polymenorrhea 06/26/2013     Priority: Medium     Dysmenorrhea 06/26/2013     Priority: Medium     Ovarian cyst 06/26/2013     Priority: Medium     Family history of diabetes mellitus 06/15/2011     Priority: Medium     CARDIOVASCULAR SCREENING; LDL GOAL LESS THAN 160 10/31/2010     Priority: Medium     Insomnia      Priority: Medium     Anxiety      Priority: Medium     IBS (irritable bowel syndrome)      Priority: Medium      Past Medical History:   Diagnosis Date     Anxiety      History of diarrhea      IBS (irritable bowel syndrome)      Insomnia      Past Surgical History:   Procedure Laterality Date     BREAST SURGERY  3/2013    breast implants     SURGICAL HISTORY OF -   3/13    bilateral breast augmentations with implants     TUBAL LIGATION  4/07     Current Outpatient Prescriptions   Medication Sig Dispense Refill     ALPRAZolam (XANAX) 0.5 MG tablet Take 1 tablet (0.5 mg) by mouth as needed for anxiety 10 tablet 0     FLUoxetine (PROZAC) 40 MG capsule Take 1 capsule (40 mg) by mouth every morning 90 capsule 4     norgestrel-ethinyl estradiol (LO/OVRAL) 0.3-30 MG-MCG per tablet Take 1 tablet by mouth daily 84 tablet 4     omeprazole (PRILOSEC) 20 MG CR capsule Take 1 capsule (20 mg) by mouth daily 30 capsule 2     order for DME Equipment being ordered: bilateral wrist splints with thumb 2 each 0     traZODone (DESYREL) 50 MG tablet Take 1 tablet (50 mg) by mouth nightly as needed for sleep 90 tablet 4      triamterene-hydrochlorothiazide (DYAZIDE) 37.5-25 MG per capsule Take 1 capsule by mouth daily 90 capsule 3     OTC products: None, except as noted above    Allergies   Allergen Reactions     Lexapro      Increased anxiety       Zoloft      NIGHT SWEATS AND RASH FROM SWEATING AND INCREASE IN WEIGHT      Latex Allergy: NO    Social History   Substance Use Topics     Smoking status: Former Smoker     Packs/day: 0.30     Years: 18.00     Types: Cigarettes     Quit date: 7/1/2017     Smokeless tobacco: Never Used     Alcohol use Yes      Comment: SOCIALLY     History   Drug Use No     Immunization History   Administered Date(s) Administered     HEPA 12/01/2006, 06/22/2007, 02/15/2008     Historical DTP/aP 1975, 1975, 1974, 08/01/1975     Influenza (IIV3) PF 10/20/2012, 10/01/2014, 11/01/2016     Influenza Vaccine IM 3yrs+ 4 Valent IIV4 10/08/2015, 11/22/2016, 10/18/2017     MMR 02/13/1975, 12/11/1992     OPV, trivalent, live 1974, 1974, 08/01/1975     TD (ADULT, 7+) 07/24/2002     TDAP Vaccine (Adacel) 03/15/2010      REVIEW OF SYSTEMS:   CONSTITUTIONAL: NEGATIVE for fever, chills, change in weight  INTEGUMENTARY/SKIN: NEGATIVE for worrisome rashes, moles or lesions  EYES: NEGATIVE for vision changes or irritation  ENT/MOUTH: NEGATIVE for ear, mouth and throat problems  RESP: NEGATIVE for significant cough or SOB  BREAST: NEGATIVE for masses, tenderness or discharge  CV: NEGATIVE for chest pain, palpitations or peripheral edema  GI: NEGATIVE for nausea, abdominal pain, heartburn, or change in bowel habits  : NEGATIVE for frequency, dysuria, or hematuria  MUSCULOSKELETAL: NEGATIVE for significant arthralgias or myalgia  NEURO: NEGATIVE for weakness, dizziness or paresthesias  ENDOCRINE: NEGATIVE for temperature intolerance, skin/hair changes  HEME: NEGATIVE for bleeding problems  PSYCHIATRIC: NEGATIVE for changes in mood or affect    EXAM:   /80  Pulse 74  Temp 99.4  F (37.4  C)  "(Oral)  Ht 5' 5\" (1.651 m)  Wt 246 lb 8 oz (111.8 kg)  LMP 05/23/2018 (Exact Date)  SpO2 99%  BMI 41.02 kg/m2    GENERAL APPEARANCE: healthy, alert and no distress     EYES: EOMI, PERRL     HENT: ear canals and TM's normal and nose and mouth without ulcers or lesions     NECK: no adenopathy, no asymmetry, masses, or scars and thyroid normal to palpation     RESP: lungs clear to auscultation - no rales, rhonchi or wheezes     CV: regular rates and rhythm, normal S1 S2, no S3 or S4 and no murmur, click or rub     ABDOMEN:  soft, nontender, no HSM or masses and bowel sounds normal     MS: extremities normal- no gross deformities noted, no evidence of inflammation in joints, FROM in all extremities.     SKIN: no suspicious lesions or rashes     NEURO: Normal strength and tone, sensory exam grossly normal, mentation intact and speech normal     PSYCH: mentation appears normal. and affect normal/bright     LYMPHATICS: No cervical adenopathy    DIAGNOSTICS:   No labs or EKG required for low risk surgery (cataract, skin procedure, breast biopsy, etc)    Recent Labs   Lab Test  05/30/17   1024  12/22/16   1519   HGB  14.2  13.3   PLT  284  288   NA   --   139   POTASSIUM   --   3.8   CR   --   0.90        IMPRESSION:   Reason for surgery/procedure: right carpal tunnel syndrome   Diagnosis/reason for consult: need for clearance     The proposed surgical procedure is considered LOW risk.    REVISED CARDIAC RISK INDEX  The patient has the following serious cardiovascular risks for perioperative complications such as (MI, PE, VFib and 3  AV Block):  No serious cardiac risks  INTERPRETATION: 0 risks: Class I (very low risk - 0.4% complication rate)    The patient has the following additional risks for perioperative complications:  No identified additional risks      ICD-10-CM    1. Preop general physical exam Z01.818        RECOMMENDATIONS:     --Consult hospital rounder / IM to assist post-op medical management    --Patient " is to take all scheduled medications on the day of surgery EXCEPT for modifications listed below.    APPROVAL GIVEN to proceed with proposed procedure, without further diagnostic evaluation       Signed Electronically by: KRISTINA DUNLAP MD    Copy of this evaluation report is provided to requesting physician.    [facility] Preop Guidelines    Revised Cardiac Risk Index

## 2018-06-05 NOTE — PATIENT INSTRUCTIONS
Before Your Surgery      Call your surgeon if there is any change in your health. This includes signs of a cold or flu (such as a sore throat, runny nose, cough, rash or fever).    Do not smoke, drink alcohol or take over the counter medicine (unless your surgeon or primary care doctor tells you to) for the 24 hours before and after surgery.    If you take prescribed drugs: Follow your doctor s orders about which medicines to take and which to stop until after surgery.    Eating and drinking prior to surgery: follow the instructions from your surgeon    Take a shower or bath the night before surgery. Use the soap your surgeon gave you to gently clean your skin. If you do not have soap from your surgeon, use your regular soap. Do not shave or scrub the surgery site.  Wear clean pajamas and have clean sheets on your bed.     [facility]    If you have any questions regarding to your visit please contact your care team:       Team Purple:   Clinic Hours Telephone Number   Dr. Emilee Honeycutt   7am-7pm  Monday - Thursday   7am-5pm  Fridays  [phone]  (Appointment scheduling available 24/7)    Questions about your recent visit?   Team Line:  [phone]   Urgent Care - [place] and [facility] - 11am-9pm Monday-Friday Saturday-Sunday- 9am-5pm   [place] - 5pm-9pm Monday-Friday Saturday-Sunday- 9am-5pm  [phone] - [place]  [phone] - [place]       What options do I have for a visit other than an office visit? We offer electronic visits (e-visits) and telephone visits, when medically appropriate.  Please check with your medical insurance to see if these types of visits are covered, as you will be responsible for any charges that are not paid by your insurance.      You can use [facility] (secure electronic communication) to access to your chart, send your primary care provider a message, or make an appointment. Ask a team member how  to get started.     For a price quote for your services, please call our Consumer Price Line at [phone] or our Imaging Cost estimation line at [phone] (for imaging tests).

## 2018-06-06 ENCOUNTER — TELEPHONE (OUTPATIENT)
Dept: ORTHOPEDICS | Facility: CLINIC | Age: 43
End: 2018-06-06

## 2018-06-06 NOTE — TELEPHONE ENCOUNTER
I left a fairly detailed message for Megan, letting her know what my general recommendations are for time off from stage carpal tunnel releases. ~4-6 weeks from time of first surgery, if possible. I asked that she call back to discuss further if she would like, as this may be easier to have a dialogue.    Kev Valdes PA-C, CAQ (Ortho)  Supervising Physician: Moe Castro M.D., M.S.  Dept. of Orthopaedic Surgery  [facility]

## 2018-06-06 NOTE — TELEPHONE ENCOUNTER
Reason for Call:  Other call back    Detailed comments: Patient is scheduled for bilateral carpal tunnel release on 06/21/2018 and 07/12/2018 patient is wanting to know how much time she should be asking off for work. Please call and advise Thank you     Phone Number Patient can be reached at: Cell number on file:    Telephone Information:   Mobile [phone]       Best Time: any    Can we leave a detailed message on this number? YES    Call taken on 6/6/2018 at 9:43 AM by Sabina Gallegos

## 2018-06-11 ENCOUNTER — TELEPHONE (OUTPATIENT)
Dept: ORTHOPEDICS | Facility: CLINIC | Age: 43
End: 2018-06-11

## 2018-06-11 NOTE — TELEPHONE ENCOUNTER
I spoke to Megan and discussed recovery expectations for her after her carpal tunnel release. She has cancelled her left carpal tunnel release and will reschedule further into the future. She can't really take more than a week or so off work. She will plan one week off after surgery and then work from home the following.    Kev Valdes PA-C, CAQ (Ortho)  Supervising Physician: Moe Castro M.D., M.S.  Dept. of Orthopaedic Surgery  [facility]

## 2018-06-11 NOTE — TELEPHONE ENCOUNTER
Reason for Call:  Questions re:time off for recovery    Detailed comments: Patient is cancelling her left carpal tunnel surgery but keeping the 6-21-18 surgery for her right wrist. Patient has questions regarding time off for recovery. Please call patient to discuss.    Phone Number Patient can be reached at: Cell number on file:    Telephone Information:   Mobile [phone]       Best Time: any    Can we leave a detailed message on this number? YES    Call taken on 6/11/2018 at 8:08 AM by Karolina Kimbrough

## 2018-06-15 ENCOUNTER — OFFICE VISIT (OUTPATIENT)
Dept: FAMILY MEDICINE | Facility: CLINIC | Age: 43
End: 2018-06-15
Payer: COMMERCIAL

## 2018-06-15 VITALS
HEART RATE: 74 BPM | TEMPERATURE: 99.4 F | HEIGHT: 65 IN | BODY MASS INDEX: 41.07 KG/M2 | DIASTOLIC BLOOD PRESSURE: 80 MMHG | SYSTOLIC BLOOD PRESSURE: 120 MMHG | WEIGHT: 246.5 LBS | OXYGEN SATURATION: 99 %

## 2018-06-15 DIAGNOSIS — Z01.818 PREOP GENERAL PHYSICAL EXAM: Primary | ICD-10-CM

## 2018-06-15 PROCEDURE — 99214 OFFICE O/P EST MOD 30 MIN: CPT | Performed by: FAMILY MEDICINE

## 2018-06-15 ASSESSMENT — PAIN SCALES - GENERAL: PAINLEVEL: MODERATE PAIN (5)

## 2018-06-15 NOTE — MR AVS SNAPSHOT
After Visit Summary   6/15/2018    Megan Dowd    MRN: [id number]           Patient Information     Date Of Birth          1975        Visit Information        Provider Department      6/15/2018 11:00 AM Emilee Ulloa MD [facility]        Today's Diagnoses     Preop general physical exam    -  1      Care Instructions      Before Your Surgery      Call your surgeon if there is any change in your health. This includes signs of a cold or flu (such as a sore throat, runny nose, cough, rash or fever).    Do not smoke, drink alcohol or take over the counter medicine (unless your surgeon or primary care doctor tells you to) for the 24 hours before and after surgery.    If you take prescribed drugs: Follow your doctor s orders about which medicines to take and which to stop until after surgery.    Eating and drinking prior to surgery: follow the instructions from your surgeon    Take a shower or bath the night before surgery. Use the soap your surgeon gave you to gently clean your skin. If you do not have soap from your surgeon, use your regular soap. Do not shave or scrub the surgery site.  Wear clean pajamas and have clean sheets on your bed.     [facility]    If you have any questions regarding to your visit please contact your care team:       Team Purple:   Clinic Hours Telephone Number   Dr. Emilee Honeycutt   7am-7pm  Monday - Thursday   7am-5pm  Fridays  [phone]  (Appointment scheduling available 24/7)    Questions about your recent visit?   Team Line:  [phone]   Urgent Care - [place] and [place] - 11am-9pm Monday-Friday Saturday-Sunday- 9am-5pm   [place] - 5pm-9pm Monday-Friday Saturday-Sunday- 9am-5pm  [phone] - [place]  [phone] - [place]       What options do I have for a visit other than an office visit? We offer electronic visits (e-visits) and telephone  visits, when medically appropriate.  Please check with your medical insurance to see if these types of visits are covered, as you will be responsible for any charges that are not paid by your insurance.      You can use PaperShare (secure electronic communication) to access to your chart, send your primary care provider a message, or make an appointment. Ask a team member how to get started.     For a price quote for your services, please call our Consumer Price Line at [phone] or our Imaging Cost estimation line at [phone] (for imaging tests).              Follow-ups after your visit        Your next 10 appointments already scheduled     Jun 21, 2018   Procedure with Moe Castro MD   [facility] (--)    [address] [place] [address]   [phone]            Jul 11, 2018  8:00 AM CDT   Return Visit with Moe Castro MD   [facility]    [address]  [place] [id number] [phone]              Who to contact     If you have questions or need follow up information about today's clinic visit or your schedule please contact [facility] directly at [phone].  Normal or non-critical lab and imaging results will be communicated to you by [employer], letter or phone within 4 business days after the clinic has received the results. If you do not hear from us within 7 days, please contact the clinic through [employer] or phone. If you have a critical or abnormal lab result, we will notify you by phone as soon as possible.  Submit refill requests through PaperShare or call your pharmacy and they will forward the refill request to us. Please allow 3 business days for your refill to be completed.          Additional Information About Your Visit        [employer] Information     PaperShare gives you secure access to your electronic health record. If you see a primary care provider, you can also send messages to your care  "team and make appointments. If you have questions, please call your primary care clinic.  If you do not have a primary care provider, please call [phone] and they will assist you.        Care EveryWhere ID     This is your Care EveryWhere ID. This could be used by other organizations to access your [facility] medical records  [id number]        Your Vitals Were     Pulse Temperature Height Last Period Pulse Oximetry BMI (Body Mass Index)    74 99.4  F (37.4  C) (Oral) 5' 5\" (1.651 m) 05/23/2018 (Exact Date) 99% 41.02 kg/m2       Blood Pressure from Last 3 Encounters:   06/15/18 120/80   05/25/18 125/85   04/26/18 130/60    Weight from Last 3 Encounters:   06/15/18 246 lb 8 oz (111.8 kg)   05/25/18 241 lb (109.3 kg)   04/26/18 242 lb 3.2 oz (109.9 kg)              Today, you had the following     No orders found for display       Primary Care Provider Office Phone # Fax #    Emilee Ulloa [phone]       [address] [address]        Equal Access to Services     MAXIME SCHMID : Hadradha montoya Soluis felipe, waaxda luchonadaha, qaybta kaalmada adeegyada, solitario mcgill. So [facility] [phone].    ATENCIÓN: Si habla español, tiene a casey disposición servicios gratuitos de asistencia lingüística. Llame al [phone].    We comply with applicable federal civil rights laws and [place] laws. We do not discriminate on the basis of race, color, national origin, age, disability, sex, sexual orientation, or gender identity.            Thank you!     Thank you for choosing [facility]  for your care. Our goal is always to provide you with excellent care. Hearing back from our patients is one way we can continue to improve our services. Please take a few minutes to complete the written survey that you may receive in the mail after your visit with us. Thank you!             Your Updated Medication List - Protect others around you: Learn " how to safely use, store and throw away your medicines at www.disposemymeds.org.          This list is accurate as of 6/15/18 11:41 AM.  Always use your most recent med list.                   Brand Name Dispense Instructions for use Diagnosis    ALPRAZolam 0.5 MG tablet    XANAX    10 tablet    Take 1 tablet (0.5 mg) by mouth as needed for anxiety    Anxiety       FLUoxetine 40 MG capsule    PROZAC    90 capsule    Take 1 capsule (40 mg) by mouth every morning    Anxiety       norgestrel-ethinyl estradiol 0.3-30 MG-MCG per tablet    LO/OVRAL    84 tablet    Take 1 tablet by mouth daily    Polymenorrhea       omeprazole 20 MG CR capsule    priLOSEC    30 capsule    Take 1 capsule (20 mg) by mouth daily    RUQ abdominal pain       order for DME     2 each    Equipment being ordered: bilateral wrist splints with thumb    Bilateral carpal tunnel syndrome       traZODone 50 MG tablet    DESYREL    90 tablet    Take 1 tablet (50 mg) by mouth nightly as needed for sleep    Primary insomnia       triamterene-hydrochlorothiazide 37.5-25 MG per capsule    DYAZIDE    90 capsule    Take 1 capsule by mouth daily    Bilateral carpal tunnel syndrome

## 2018-06-19 ENCOUNTER — ANESTHESIA EVENT (OUTPATIENT)
Dept: SURGERY | Facility: AMBULATORY SURGERY CENTER | Age: 43
End: 2018-06-19

## 2018-06-19 RX ORDER — ONDANSETRON 2 MG/ML
4 INJECTION INTRAMUSCULAR; INTRAVENOUS EVERY 30 MIN PRN
Status: CANCELLED | OUTPATIENT
Start: 2018-06-19

## 2018-06-19 RX ORDER — DEXAMETHASONE SODIUM PHOSPHATE 4 MG/ML
4 INJECTION, SOLUTION INTRA-ARTICULAR; INTRALESIONAL; INTRAMUSCULAR; INTRAVENOUS; SOFT TISSUE EVERY 10 MIN PRN
Status: CANCELLED | OUTPATIENT
Start: 2018-06-19

## 2018-06-19 RX ORDER — HYDROMORPHONE HYDROCHLORIDE 1 MG/ML
.3-.5 INJECTION, SOLUTION INTRAMUSCULAR; INTRAVENOUS; SUBCUTANEOUS EVERY 10 MIN PRN
Status: CANCELLED | OUTPATIENT
Start: 2018-06-19

## 2018-06-19 RX ORDER — SODIUM CHLORIDE, SODIUM LACTATE, POTASSIUM CHLORIDE, CALCIUM CHLORIDE 600; 310; 30; 20 MG/100ML; MG/100ML; MG/100ML; MG/100ML
INJECTION, SOLUTION INTRAVENOUS CONTINUOUS
Status: CANCELLED | OUTPATIENT
Start: 2018-06-19

## 2018-06-19 RX ORDER — NALOXONE HYDROCHLORIDE 0.4 MG/ML
.1-.4 INJECTION, SOLUTION INTRAMUSCULAR; INTRAVENOUS; SUBCUTANEOUS
Status: CANCELLED | OUTPATIENT
Start: 2018-06-19 | End: 2018-06-20

## 2018-06-19 RX ORDER — FENTANYL CITRATE 50 UG/ML
25-50 INJECTION, SOLUTION INTRAMUSCULAR; INTRAVENOUS
Status: CANCELLED | OUTPATIENT
Start: 2018-06-19

## 2018-06-19 RX ORDER — MEPERIDINE HYDROCHLORIDE 25 MG/ML
12.5 INJECTION INTRAMUSCULAR; INTRAVENOUS; SUBCUTANEOUS
Status: CANCELLED | OUTPATIENT
Start: 2018-06-19

## 2018-06-19 RX ORDER — ALBUTEROL SULFATE 0.83 MG/ML
2.5 SOLUTION RESPIRATORY (INHALATION) EVERY 4 HOURS PRN
Status: CANCELLED | OUTPATIENT
Start: 2018-06-19

## 2018-06-19 RX ORDER — KETOROLAC TROMETHAMINE 30 MG/ML
30 INJECTION, SOLUTION INTRAMUSCULAR; INTRAVENOUS EVERY 6 HOURS PRN
Status: CANCELLED | OUTPATIENT
Start: 2018-06-19 | End: 2018-06-24

## 2018-06-19 RX ORDER — ONDANSETRON 4 MG/1
4 TABLET, ORALLY DISINTEGRATING ORAL EVERY 30 MIN PRN
Status: CANCELLED | OUTPATIENT
Start: 2018-06-19

## 2018-06-21 ENCOUNTER — SURGERY (OUTPATIENT)
Age: 43
End: 2018-06-21

## 2018-06-21 ENCOUNTER — HOSPITAL ENCOUNTER (OUTPATIENT)
Facility: AMBULATORY SURGERY CENTER | Age: 43
Discharge: HOME | End: 2018-06-21
Attending: ORTHOPAEDIC SURGERY | Admitting: ORTHOPAEDIC SURGERY
Payer: COMMERCIAL

## 2018-06-21 ENCOUNTER — ANESTHESIA (OUTPATIENT)
Dept: SURGERY | Facility: AMBULATORY SURGERY CENTER | Age: 43
End: 2018-06-21
Payer: COMMERCIAL

## 2018-06-21 VITALS
TEMPERATURE: 97.1 F | OXYGEN SATURATION: 98 % | RESPIRATION RATE: 16 BRPM | SYSTOLIC BLOOD PRESSURE: 113 MMHG | DIASTOLIC BLOOD PRESSURE: 77 MMHG

## 2018-06-21 DIAGNOSIS — G56.01 CARPAL TUNNEL SYNDROME, RIGHT: ICD-10-CM

## 2018-06-21 DIAGNOSIS — Z98.890 S/P CARPAL TUNNEL RELEASE: Primary | ICD-10-CM

## 2018-06-21 LAB — BETA HCG QUAL IFA URINE: NEGATIVE

## 2018-06-21 PROCEDURE — 84703 CHORIONIC GONADOTROPIN ASSAY: CPT | Performed by: ANESTHESIOLOGY

## 2018-06-21 PROCEDURE — 64721 CARPAL TUNNEL SURGERY: CPT | Mod: RT

## 2018-06-21 PROCEDURE — 64721 CARPAL TUNNEL SURGERY: CPT | Mod: RT | Performed by: ORTHOPAEDIC SURGERY

## 2018-06-21 PROCEDURE — G8907 PT DOC NO EVENTS ON DISCHARG: HCPCS

## 2018-06-21 PROCEDURE — G8916 PT W IV AB GIVEN ON TIME: HCPCS

## 2018-06-21 RX ORDER — HYDROCODONE BITARTRATE AND ACETAMINOPHEN 5; 325 MG/1; MG/1
1-2 TABLET ORAL EVERY 4 HOURS PRN
Qty: 20 TABLET | Refills: 0 | Status: SHIPPED | OUTPATIENT
Start: 2018-06-21 | End: 2018-07-11

## 2018-06-21 RX ORDER — ONDANSETRON 4 MG/1
4 TABLET, ORALLY DISINTEGRATING ORAL
Status: CANCELLED | OUTPATIENT
Start: 2018-06-21

## 2018-06-21 RX ORDER — HYDROXYZINE HYDROCHLORIDE 25 MG/1
25 TABLET, FILM COATED ORAL
Status: CANCELLED | OUTPATIENT
Start: 2018-06-21

## 2018-06-21 RX ORDER — PROPOFOL 10 MG/ML
INJECTION, EMULSION INTRAVENOUS PRN
Status: DISCONTINUED | OUTPATIENT
Start: 2018-06-21 | End: 2018-06-21

## 2018-06-21 RX ORDER — DEXAMETHASONE SODIUM PHOSPHATE 4 MG/ML
INJECTION, SOLUTION INTRA-ARTICULAR; INTRALESIONAL; INTRAMUSCULAR; INTRAVENOUS; SOFT TISSUE PRN
Status: DISCONTINUED | OUTPATIENT
Start: 2018-06-21 | End: 2018-06-21

## 2018-06-21 RX ORDER — FENTANYL CITRATE 50 UG/ML
INJECTION, SOLUTION INTRAMUSCULAR; INTRAVENOUS PRN
Status: DISCONTINUED | OUTPATIENT
Start: 2018-06-21 | End: 2018-06-21

## 2018-06-21 RX ORDER — HYDROCODONE BITARTRATE AND ACETAMINOPHEN 5; 325 MG/1; MG/1
1 TABLET ORAL
Status: CANCELLED | OUTPATIENT
Start: 2018-06-21

## 2018-06-21 RX ORDER — PROPOFOL 10 MG/ML
INJECTION, EMULSION INTRAVENOUS CONTINUOUS PRN
Status: DISCONTINUED | OUTPATIENT
Start: 2018-06-21 | End: 2018-06-21

## 2018-06-21 RX ORDER — LIDOCAINE 40 MG/G
CREAM TOPICAL
Status: DISCONTINUED | OUTPATIENT
Start: 2018-06-21 | End: 2018-06-22 | Stop reason: HOSPADM

## 2018-06-21 RX ORDER — CEFAZOLIN SODIUM 2 G/100ML
2 INJECTION, SOLUTION INTRAVENOUS
Status: COMPLETED | OUTPATIENT
Start: 2018-06-21 | End: 2018-06-21

## 2018-06-21 RX ORDER — ONDANSETRON 2 MG/ML
INJECTION INTRAMUSCULAR; INTRAVENOUS PRN
Status: DISCONTINUED | OUTPATIENT
Start: 2018-06-21 | End: 2018-06-21

## 2018-06-21 RX ORDER — ACETAMINOPHEN 325 MG/1
975 TABLET ORAL ONCE
Status: COMPLETED | OUTPATIENT
Start: 2018-06-21 | End: 2018-06-21

## 2018-06-21 RX ORDER — AMOXICILLIN 250 MG
1-2 CAPSULE ORAL 2 TIMES DAILY
Qty: 30 TABLET | Refills: 0 | Status: SHIPPED | OUTPATIENT
Start: 2018-06-21 | End: 2018-07-11

## 2018-06-21 RX ORDER — GABAPENTIN 300 MG/1
300 CAPSULE ORAL ONCE
Status: COMPLETED | OUTPATIENT
Start: 2018-06-21 | End: 2018-06-21

## 2018-06-21 RX ORDER — CEFAZOLIN SODIUM 1 G/3ML
1 INJECTION, POWDER, FOR SOLUTION INTRAMUSCULAR; INTRAVENOUS SEE ADMIN INSTRUCTIONS
Status: DISCONTINUED | OUTPATIENT
Start: 2018-06-21 | End: 2018-06-22 | Stop reason: HOSPADM

## 2018-06-21 RX ORDER — SODIUM CHLORIDE, SODIUM LACTATE, POTASSIUM CHLORIDE, CALCIUM CHLORIDE 600; 310; 30; 20 MG/100ML; MG/100ML; MG/100ML; MG/100ML
INJECTION, SOLUTION INTRAVENOUS CONTINUOUS
Status: DISCONTINUED | OUTPATIENT
Start: 2018-06-21 | End: 2018-06-22 | Stop reason: HOSPADM

## 2018-06-21 RX ORDER — LIDOCAINE HYDROCHLORIDE 20 MG/ML
INJECTION, SOLUTION INFILTRATION; PERINEURAL PRN
Status: DISCONTINUED | OUTPATIENT
Start: 2018-06-21 | End: 2018-06-21

## 2018-06-21 RX ORDER — METHOCARBAMOL 750 MG/1
750 TABLET, FILM COATED ORAL
Status: CANCELLED | OUTPATIENT
Start: 2018-06-21

## 2018-06-21 RX ADMIN — ONDANSETRON 4 MG: 2 INJECTION INTRAMUSCULAR; INTRAVENOUS at 10:22

## 2018-06-21 RX ADMIN — PROPOFOL 20 MG: 10 INJECTION, EMULSION INTRAVENOUS at 10:16

## 2018-06-21 RX ADMIN — PROPOFOL 20 MG: 10 INJECTION, EMULSION INTRAVENOUS at 10:15

## 2018-06-21 RX ADMIN — ACETAMINOPHEN 975 MG: 325 TABLET ORAL at 09:00

## 2018-06-21 RX ADMIN — CEFAZOLIN SODIUM 2 G: 2 INJECTION, SOLUTION INTRAVENOUS at 10:22

## 2018-06-21 RX ADMIN — FENTANYL CITRATE 100 MCG: 50 INJECTION, SOLUTION INTRAMUSCULAR; INTRAVENOUS at 10:10

## 2018-06-21 RX ADMIN — SODIUM CHLORIDE, SODIUM LACTATE, POTASSIUM CHLORIDE, CALCIUM CHLORIDE: 600; 310; 30; 20 INJECTION, SOLUTION INTRAVENOUS at 09:23

## 2018-06-21 RX ADMIN — LIDOCAINE HYDROCHLORIDE 60 MG: 20 INJECTION, SOLUTION INFILTRATION; PERINEURAL at 10:14

## 2018-06-21 RX ADMIN — PROPOFOL 150 MCG/KG/MIN: 10 INJECTION, EMULSION INTRAVENOUS at 10:14

## 2018-06-21 RX ADMIN — DEXAMETHASONE SODIUM PHOSPHATE 4 MG: 4 INJECTION, SOLUTION INTRA-ARTICULAR; INTRALESIONAL; INTRAMUSCULAR; INTRAVENOUS; SOFT TISSUE at 10:22

## 2018-06-21 RX ADMIN — PROPOFOL 20 MG: 10 INJECTION, EMULSION INTRAVENOUS at 10:17

## 2018-06-21 RX ADMIN — Medication 6 ML: at 10:15

## 2018-06-21 RX ADMIN — PROPOFOL 20 MG: 10 INJECTION, EMULSION INTRAVENOUS at 10:14

## 2018-06-21 RX ADMIN — GABAPENTIN 300 MG: 300 CAPSULE ORAL at 09:00

## 2018-06-21 RX ADMIN — PROPOFOL 20 MG: 10 INJECTION, EMULSION INTRAVENOUS at 10:25

## 2018-06-21 ASSESSMENT — LIFESTYLE VARIABLES: TOBACCO_USE: 0

## 2018-06-21 NOTE — ANESTHESIA POSTPROCEDURE EVALUATION
Patient: Megan Dowd    Procedure(s):  Right carpal tunnel release - Wound Class: I-Clean    Diagnosis:Right carpal tunnel syndrome  Diagnosis Additional Information: No value filed.    Anesthesia Type:  MAC    Note:  Anesthesia Post Evaluation    Patient location during evaluation: Phase 2  Patient participation: Able to fully participate in evaluation  Level of consciousness: awake and alert  Pain management: adequate  Airway patency: patent  Cardiovascular status: acceptable  Respiratory status: acceptable  Hydration status: acceptable  PONV: none     Anesthetic complications: None          Last vitals:  Vitals:    06/21/18 0913 06/21/18 1050 06/21/18 1105   BP: 130/85 120/74 113/77   Resp: 18 16 16   Temp: 97.7  F (36.5  C) 97.1  F (36.2  C)    SpO2: 95% 97% 98%         Electronically Signed By: Petros Kelly DO  June 21, 2018  12:47 PM

## 2018-06-21 NOTE — IP AVS SNAPSHOT
MRN:[id number]                      After Visit Summary   6/21/2018    Megan Dowd    MRN: [id number]           Thank you!     Thank you for choosing [facility] for your care. Our goal is always to provide you with excellent care. Hearing back from our patients is one way we can continue to improve our services. Please take a few minutes to complete the written survey that you may receive in the mail after you visit with us. Thank you!        Patient Information     Date Of Birth          1975        About your hospital stay     You were admitted on:  June 21, 2018 You [place] [facility] in the[place]  [facility]    You were discharged on:  June 21, 2018       Who to Call     For medical emergencies, please call 911.  For non-urgent questions about your medical care, please call your primary care provider or clinic, [phone]  For questions related to your surgery, please call your surgery clinic        Attending Provider     Provider Moe Jarrell MD Orthopedics       Primary Care Provider Office Phone # Fax #    Emilee Ulloa [phone]      After Care Instructions      Diet as Tolerated       Return to diet before surgery, unless instructed otherwise.            Discharge Instructions       Review outpatient procedure discharge instructions with patient as directed by Provider            Ice to affected area       Ice pack to surgical site every 15 minutes per hour for 24 hours            No Alcohol       For 24 hours post procedure or while taking pain medications.            No Dressing Change       No dressing change until follow up appointment.            No driving or operating machinery        until the day after procedure or until off pain medications.            Notify Provider       For signs and symptoms of infection: Fever greater than 101.5, redness, swelling, heat at site, drainage, pus.            Return to clinic        Return to clinic in 2 weeks            Weight bearing - Partial       Partial weight bearing in splint.            Wound care       Do not immerse wound in water until sutures removed                  Your next 10 appointments already scheduled     2018  8:00 AM CDT   Return Visit with Moe Castro MD   [facility] [place] ([facility])    [address]  [place] [address]   [phone]              Further instructions from your care team       [facility]  Same-Day Surgery   Adult Discharge Orders & Instructions   For 24 hours after surgery  1. Get plenty of rest.  A responsible adult must stay with you for at least 24 hours after you leave the hospital.   2. Do not drive or use heavy equipment.  If you have weakness or tingling, don't drive or use heavy equipment until this feeling goes away.  3. Do not drink alcohol.  4. Avoid strenuous or risky activities.  Ask for help when climbing stairs.   5. You may feel lightheaded.  IF so, sit for a few minutes before standing.  Have someone help you get up.   6. If you have nausea (feel sick to your stomach): Drink only clear liquids such as apple juice, ginger ale, broth or 7-Up.  Rest may also help.  Be sure to drink enough fluids.  Move to a regular diet as you feel able.  7. You may have a slight fever. Call the doctor if your fever is over 100 F (37.7 C) (taken under the tongue) or lasts longer than 24 hours.  8. You may have a dry mouth, a sore throat, muscle aches or trouble sleeping.  These should go away after 24 hours.  9. Do not make important or legal decisions.   Call your doctor for any of the followin.  Signs of infection (fever, growing tenderness at the surgery site, a large amount of drainage or bleeding, severe pain, foul-smelling drainage, redness, swelling).    2. It has been over 8 to 10 hours since surgery and you are still not able to urinate (pass water).    3.  Headache  for over 24 hours.       To contact Dr Castro call:  [phone]      1. Name: Megan Dowd MRN #: [id number]  2. Date: 6/21/2018  Procedure: right carpal tunnel release.  3. Discharge to home when stable, tolerating clear liquids, and patient has urinated  4. Call for follow-up appointment, [phone], with Dr. Castro in:  2 weeks.   WOUND CARE    The bandage may be slightly bloody. This is normal.  5. Ice:  Keep an ice bag on your hand for 20 minutes at a time.  6. Keep incisions clean and dry following surgery for:  Until follow-up   7. Change all bandages in:  Not until follow-up       8. If bandages are changed before follow-up, cover all incisions with fresh bandages or bandaids.  9. O.K. to shower (may get incision wet) in:  Not until sutures removed.  10. No tub baths, swimming pools, hot tubs, etc. for a minimum of 2 weeks following surgery  ACTIVITY  11. Keep hand elevated above heart at all times for the first few days after surgery, then as much as possible.  12. Weight-bearing ([place]):  Partial weight-bearing 30% in splint    13. Exercises:  Perform exercises 3 times a day for a minimum of 25 reps each time (start today or tomorrow):             Finger range of motion   14. OK to drive:  Not for 24 hours    When going back to driving, be sure to test braking/acceleration maneuvers in an empty parking lot before entry into any traffic areas.      ABSOLUTELY NO DRIVING WHILE TAKING NARCOTICS!    DISCHARGE MEDICATIONS:   Norco (5/325), 1 to 2 tablets, take every 4 to 6 hours as needed for pain, do not exceed 12 tabs/day  Ok to take over the counter pain medications as needed    Strong pain medication has been prescribed. Use as directed. Do not combine with alcohol. Be careful as you walk or climb stairs.   DIET:  If no nausea, clear liquids should be taken initially.  Then progress to solid foods when clear liquids are tolerated.   RESPONSE TO SURGERY: It is normal to have pain and swelling in  your hand after surgery. It may take 4 weeks or longer for the swelling to go away. It is also common to notice some bruising around the hand, wrist and forearm as the swelling resolves.  EMERGENCY: Call or return for any fevers (temperature greater than 101.5   or sustained fevers greater than 100.5   that haven t resolved within 3 to 4 days following surgery) or chills, increasing pain, swelling, redness, drainage (especially if yellow, green, or foul smelling), excessive bleeding), chest pain, shortness of breath:  Phone #: [phone]; If emergency, go to local ER or dial 911.    Moe Castro M.D., M.S.  Dept. of Orthopaedic Surgery  [facility]      6/21/2018        Exercises should be performed at least 3 times per day. Move in pain-free range.           Finger Exercises      Perform 1 set of 20 reps, each exercise, 3 times a day    Perform 1 rep every 4 seconds    Rest 1 minute between sets                                Elbow Flexion/Extension     Begin with arm at side, elbow straight, palm up    Bend elbow upward    Return to starting position    Perform 1 set of 20 reps, 3 times a day    Perform 1 rep every 4 seconds    Rest 1 minute between sets               Tylenol was given at 9:00 am.      Pending Results     No orders found from 6/19/2018 to 6/22/2018.            Admission Information     Date & Time Provider Department Dept. Phone    6/21/2018 Moe Castro MD [facility] [place] [facility] [phone]      Your Vitals Were     Blood Pressure Temperature Respirations Last Period Pulse Oximetry       120/74 97.1  F (36.2  C) (Temporal) 16 05/23/2018 (Exact Date) 97%       MyChart Information     [employer] gives you secure access to your electronic health record. If you see a primary care provider, you can also send messages to your care team and make appointments. If you have questions, please call your primary care clinic.  If you do not have a primary care provider, please  call [phone] and they will assist you.        Care EveryWhere ID     This is your Care EveryWhere ID. This could be used by other organizations to access your [facility] medical records  [id number]        Equal Access to Services     MAXIME SCHMID : Hadii aad ku hadchrissiecasey Muna, waaxda luqadaha, qaybta kaalmada nhi, solitario teoin hayaakaren pierce michellealvaro mcgill. So [facility] [phone].    ATENCIÓN: Si habla español, tiene a casey disposición servicios gratuitos de asistencia lingüística. Ll[facility] [phone].    We comply with applicable federal civil rights laws and [place] laws. We do not discriminate on the basis of race, color, national origin, age, disability, sex, sexual orientation, or gender identity.               Review of your medicines      START taking        Dose / Directions    HYDROcodone-acetaminophen 5-325 MG per tablet   Commonly known as:  NORCO   Used for:  S/P carpal tunnel release, Carpal tunnel syndrome, right        Dose:  1-2 tablet   Take 1-2 tablets by mouth every 4 hours as needed for other (Moderate to Severe Pain)   Quantity:  20 tablet   Refills:  0       senna-docusate 8.6-50 MG per tablet   Commonly known as:  SENOKOT-S;PERICOLACE   Used for:  S/P carpal tunnel release, Carpal tunnel syndrome, right        Dose:  1-2 tablet   Take 1-2 tablets by mouth 2 times daily Take while on oral narcotics to prevent or treat constipation.   Quantity:  30 tablet   Refills:  0         CONTINUE these medicines which have NOT CHANGED        Dose / Directions    ALPRAZolam 0.5 MG tablet   Commonly known as:  XANAX   Used for:  Anxiety        Dose:  0.5 mg   Take 1 tablet (0.5 mg) by mouth as needed for anxiety   Quantity:  10 tablet   Refills:  0       FLUoxetine 40 MG capsule   Commonly known as:  PROZAC   Used for:  Anxiety        Dose:  40 mg   Take 1 capsule (40 mg) by mouth every morning   Quantity:  90 capsule   Refills:  4       norgestrel-ethinyl estradiol 0.3-30 MG-MCG per tablet    Commonly known as:  LO/OVRAL   Used for:  Polymenorrhea        Dose:  1 tablet   Take 1 tablet by mouth daily   Quantity:  84 tablet   Refills:  4       omeprazole 20 MG CR capsule   Commonly known as:  priLOSEC   Used for:  RUQ abdominal pain        Dose:  20 mg   Take 1 capsule (20 mg) by mouth daily   Quantity:  30 capsule   Refills:  2       order for DME   Used for:  Bilateral carpal tunnel syndrome        Equipment being ordered: bilateral wrist splints with thumb   Quantity:  2 each   Refills:  0       traZODone 50 MG tablet   Commonly known as:  DESYREL   Used for:  Primary insomnia        Dose:  50 mg   Take 1 tablet (50 mg) by mouth nightly as needed for sleep   Quantity:  90 tablet   Refills:  4       triamterene-hydrochlorothiazide 37.5-25 MG per capsule   Commonly known as:  DYAZIDE   Used for:  Bilateral carpal tunnel syndrome        Dose:  1 capsule   Take 1 capsule by mouth daily   Quantity:  90 capsule   Refills:  3            Where to get your medicines      These medications were sent to [facility] [address], [address]  [address], [address], [facility] [address]     Phone:  [phone]     senna-docusate 8.6-50 MG per tablet         Some of these will need a paper prescription and others can be bought over the counter. Ask your nurse if you have questions.     Bring a paper prescription for each of these medications     HYDROcodone-acetaminophen 5-325 MG per tablet                Protect others around you: Learn how to safely use, store and throw away your medicines at www.disposemymeds.org.        Information about OPIOIDS     PRESCRIPTION OPIOIDS: WHAT YOU NEED TO KNOW   We gave you an opioid (narcotic) pain medicine. It is important to manage your pain, but opioids are not always the best choice. You should first try all the other options your care team gave you. Take this medicine for as short a time (and as few doses) as possible.      These medicines have risks:    DO NOT drive when on new or higher doses of pain medicine. These medicines can affect your alertness and reaction times, and you could be arrested for driving under the influence (DUI). If you need to use opioids long-term, talk to your care team about driving.    DO NOT operate heave machinery    DO NOT do any other dangerous activities while taking these medicines.     DO NOT drink any alcohol while taking these medicines.      If the opioid prescribed includes acetaminophen, DO NOT take with any other medicines that contain acetaminophen. Read all labels carefully. Look for the word  acetaminophen  or  Tylenol.  Ask your pharmacist if you have questions or are unsure.    You can get addicted to pain medicines, especially if you have a history of addiction (chemical, alcohol or substance dependence). Talk to your care team about ways to reduce this risk.    Store your pills in a secure place, locked if possible. We will not replace any lost or stolen medicine. If you don t finish your medicine, please throw away (dispose) as directed by your pharmacist. The [place] Pollution Control Agency has more information about safe disposal: https://www.pca.[facility].[place].us/living-green/managing-unwanted-medications.     All opioids tend to cause constipation. Drink plenty of water and eat foods that have a lot of fiber, such as fruits, vegetables, prune juice, apple juice and high-fiber cereal. Take a laxative (Miralax, milk of magnesia, Colace, Senna) if you don t move your bowels at least every other day.              Medication List: This is a list of all your medications and when to take them. Check marks below indicate your daily home schedule. Keep this list as a reference.      Medications           Morning Afternoon Evening Bedtime As Needed    ALPRAZolam 0.5 MG tablet   Commonly known as:  XANAX   Take 1 tablet (0.5 mg) by mouth as needed for anxiety                                 FLUoxetine 40 MG capsule   Commonly known as:  PROZAC   Take 1 capsule (40 mg) by mouth every morning                                HYDROcodone-acetaminophen 5-325 MG per tablet   Commonly known as:  NORCO   Take 1-2 tablets by mouth every 4 hours as needed for other (Moderate to Severe Pain)                                norgestrel-ethinyl estradiol 0.3-30 MG-MCG per tablet   Commonly known as:  LO/OVRAL   Take 1 tablet by mouth daily                                omeprazole 20 MG CR capsule   Commonly known as:  priLOSEC   Take 1 capsule (20 mg) by mouth daily                                order for DME   Equipment being ordered: bilateral wrist splints with thumb                                senna-docusate 8.6-50 MG per tablet   Commonly known as:  SENOKOT-S;PERICOLACE   Take 1-2 tablets by mouth 2 times daily Take while on oral narcotics to prevent or treat constipation.                                traZODone 50 MG tablet   Commonly known as:  DESYREL   Take 1 tablet (50 mg) by mouth nightly as needed for sleep                                triamterene-hydrochlorothiazide 37.5-25 MG per capsule   Commonly known as:  DYAZIDE   Take 1 capsule by mouth daily

## 2018-06-21 NOTE — DISCHARGE INSTRUCTIONS
[facility]  Same-Day Surgery   Adult Discharge Orders & Instructions   For 24 hours after surgery  1. Get plenty of rest.  A responsible adult must stay with you for at least 24 hours after you leave the hospital.   2. Do not drive or use heavy equipment.  If you have weakness or tingling, don't drive or use heavy equipment until this feeling goes away.  3. Do not drink alcohol.  4. Avoid strenuous or risky activities.  Ask for help when climbing stairs.   5. You may feel lightheaded.  IF so, sit for a few minutes before standing.  Have someone help you get up.   6. If you have nausea (feel sick to your stomach): Drink only clear liquids such as apple juice, ginger ale, broth or 7-Up.  Rest may also help.  Be sure to drink enough fluids.  Move to a regular diet as you feel able.  7. You may have a slight fever. Call the doctor if your fever is over 100 F (37.7 C) (taken under the tongue) or lasts longer than 24 hours.  8. You may have a dry mouth, a sore throat, muscle aches or trouble sleeping.  These should go away after 24 hours.  9. Do not make important or legal decisions.   Call your doctor for any of the followin.  Signs of infection (fever, growing tenderness at the surgery site, a large amount of drainage or bleeding, severe pain, foul-smelling drainage, redness, swelling).    2. It has been over 8 to 10 hours since surgery and you are still not able to urinate (pass water).    3.  Headache for over 24 hours.       To contact Dr Castro call:  [phone]      1. Name: Megan Dowd MRN #: [id number]  2. Date: 2018  Procedure: right carpal tunnel release.  3. Discharge to home when stable, tolerating clear liquids, and patient has urinated  4. Call for follow-up appointment, [phone], with Dr. Castro in:  2 weeks.   WOUND CARE    The bandage may be slightly bloody. This is normal.  5. Ice:  Keep an ice bag on your hand for 20 minutes at a time.  6. Keep incisions  clean and dry following surgery for:  Until follow-up   7. Change all bandages in:  Not until follow-up       8. If bandages are changed before follow-up, cover all incisions with fresh bandages or bandaids.  9. O.K. to shower (may get incision wet) in:  Not until sutures removed.  10. No tub baths, swimming pools, hot tubs, etc. for a minimum of 2 weeks following surgery  ACTIVITY  11. Keep hand elevated above heart at all times for the first few days after surgery, then as much as possible.  12. Weight-bearing ([place]):  Partial weight-bearing 30% in splint    13. Exercises:  Perform exercises 3 times a day for a minimum of 25 reps each time (start today or tomorrow):             Finger range of motion   14. OK to drive:  Not for 24 hours    When going back to driving, be sure to test braking/acceleration maneuvers in an empty parking lot before entry into any traffic areas.      ABSOLUTELY NO DRIVING WHILE TAKING NARCOTICS!    DISCHARGE MEDICATIONS:   Norco (5/325), 1 to 2 tablets, take every 4 to 6 hours as needed for pain, do not exceed 12 tabs/day  Ok to take over the counter pain medications as needed    Strong pain medication has been prescribed. Use as directed. Do not combine with alcohol. Be careful as you walk or climb stairs.   DIET:  If no nausea, clear liquids should be taken initially.  Then progress to solid foods when clear liquids are tolerated.   RESPONSE TO SURGERY: It is normal to have pain and swelling in your hand after surgery. It may take 4 weeks or longer for the swelling to go away. It is also common to notice some bruising around the hand, wrist and forearm as the swelling resolves.  EMERGENCY: Call or return for any fevers (temperature greater than 101.5   or sustained fevers greater than 100.5   that haven t resolved within 3 to 4 days following surgery) or chills, increasing pain, swelling, redness, drainage (especially if yellow, green, or foul smelling), excessive bleeding), chest  pain, shortness of breath:  Phone #: [phone]; If emergency, go to local ER or dial 911.    Moe Castro M.D., M.S.  Dept. of Orthopaedic Surgery  [facility]      6/21/2018        Exercises should be performed at least 3 times per day. Move in pain-free range.           Finger Exercises      Perform 1 set of 20 reps, each exercise, 3 times a day    Perform 1 rep every 4 seconds    Rest 1 minute between sets                                Elbow Flexion/Extension     Begin with arm at side, elbow straight, palm up    Bend elbow upward    Return to starting position    Perform 1 set of 20 reps, 3 times a day    Perform 1 rep every 4 seconds    Rest 1 minute between sets               Tylenol was given at 9:00 am.

## 2018-06-21 NOTE — ANESTHESIA PREPROCEDURE EVALUATION
Anesthesia Evaluation     . Pt has had prior anesthetic.     No history of anesthetic complications          ROS/MED HX    ENT/Pulmonary:  - neg pulmonary ROS    (-) tobacco use   Neurologic:  - neg neurologic ROS     Cardiovascular:  - neg cardiovascular ROS   (+) ----. : . . . :. . No previous cardiac testing       METS/Exercise Tolerance:  >4 METS   Hematologic:  - neg hematologic  ROS       Musculoskeletal:  - neg musculoskeletal ROS       GI/Hepatic:     (+) Other GI/Hepatic (IBS)       Renal/Genitourinary:  - ROS Renal section negative       Endo:  - neg endo ROS       Psychiatric:     (+) psychiatric history anxiety      Infectious Disease:  - neg infectious disease ROS       Malignancy:      - no malignancy   Other:    - neg other ROS                 Physical Exam  Normal systems: cardiovascular, pulmonary and dental    Airway   Mallampati: II  TM distance: >3 FB  Neck ROM: full    Dental     Cardiovascular   Rhythm and rate: regular and normal      Pulmonary    breath sounds clear to auscultation                    Anesthesia Plan      History & Physical Review  History and physical reviewed and following examination; no interval change.    ASA Status:  2 .    NPO Status:  > 8 hours    Plan for MAC Reason for MAC:  Deep or markedly invasive procedure (G8)  PONV prophylaxis:  Ondansetron (or other 5HT-3)       Postoperative Care  Postoperative pain management:  Multi-modal analgesia.      Consents  Anesthetic plan, risks, benefits and alternatives discussed with:  Patient.  Use of blood products discussed: No .   .                          .

## 2018-06-21 NOTE — ANESTHESIA CARE TRANSFER NOTE
Patient: Megan Dowd    Procedure(s):  Right carpal tunnel release - Wound Class: I-Clean    Diagnosis: Right carpal tunnel syndrome  Diagnosis Additional Information: No value filed.    Anesthesia Type:   MAC     Note:  Airway :Room Air  Patient transferred to:Phase II  Handoff Report: Identifed the Patient, Identified the Reponsible Provider, Reviewed the pertinent medical history, Discussed the surgical course, Reviewed Intra-OP anesthesia mangement and issues during anesthesia, Set expectations for post-procedure period and Allowed opportunity for questions and acknowledgement of understanding      Vitals: (Last set prior to Anesthesia Care Transfer)    CRNA VITALS  6/21/2018 1017 - 6/21/2018 1050      6/21/2018             Pulse: 77    SpO2: 96 %                Electronically Signed By: ANDREW Hernandez CRNA  June 21, 2018  10:50 AM

## 2018-06-21 NOTE — BRIEF OP NOTE
POST OPERATIVE NOTE-IMMEDIATE :    Date of surgery: 6/21/2018    Preoperative Diagnosis:  Right carpal tunnel syndrome    Postoperative Diagnosis:  right carpal tunnel syndrome    Procedures:  Procedure(s):  RELEASE CARPAL TUNNEL, right, open    Prosthetic Devices: none    Surgeon(s) and Assistants (if any):  Attending Surgeon: Moe Castro MD, MS    Assistant: Alex Valdes PA-C    Anesthesia:  Combined MAC with Local    Antibiotics: cefzolin 2g iv    IV Fluids: 500mL    UOP: none    Drains:  none    Specimens:  none    Complications:  None apparent.    Tourniquet Time: 7 minutes @ 200mmHg    Findings/Conclusions:  Thick transverse carpal ligament    Estimated Blood Loss: 2mL    Post Op Plan:  Rest  Ice  Splint  Partial weight bearing in splint  Elevation  Finger range of motion  norco for pain, otherwise over the counter medications  Return to clinic 2 weeks.      Moe Castro MD, MS  Orthopaedic Surgery  [facility]

## 2018-06-21 NOTE — H&P (VIEW-ONLY)
[facility]  [address] [place] [id number]  [phone]  Dept: [phone]    PRE-OP EVALUATION:  Today's date: 6/15/2018    Megan Dowd (: 1975) presents for pre-operative evaluation assessment as requested by Dr. Moe Castro.  She requires evaluation and anesthesia risk assessment prior to undergoing surgery/procedure for treatment of Right Carpal Tunnel Release .    Fax number for surgical facility: [phone]  Primary Physician: Emilee Ulloa  Type of Anesthesia Anticipated: General    Patient has a Health Care Directive or Living Will:  YES     Preop Questions 6/15/2018   Who is doing your surgery? dr castro   What are you having done? [facility]   Date of Surgery/Procedure:    1.  Do you have a history of Heart attack, stroke, stent, coronary bypass surgery, or other heart surgery? No   2.  Do you ever have any pain or discomfort in your chest? No   3.  Do you have a history of  Heart Failure? No   4.   Are you troubled by shortness of breath when:  walking on a level surface, or up a slight hill, or at night? No   5.  Do you currently have a cold, bronchitis or other respiratory infection? No   6.  Do you have a cough, shortness of breath, or wheezing? No   7.  Do you sometimes get pains in the calves of your legs when you walk? No   8. Do you or anyone in your family have previous history of blood clots? No   9.  Do you or does anyone in your family have a serious bleeding problem such as prolonged bleeding following surgeries or cuts? No   10. Have you ever had problems with anemia or been told to take iron pills? No   11. Have you had any abnormal blood loss such as black, tarry or bloody stools, or abnormal vaginal bleeding? No   12. Have you ever had a blood transfusion? No   13. Have you or any of your relatives ever had problems with anesthesia? YES - nausea for family members   14. Do you have sleep apnea, excessive  snoring or daytime drowsiness? No   15. Do you have any prosthetic heart valves? No   16. Do you have prosthetic joints? No   17. Is there any chance that you may be pregnant? No         HPI:  Patient has right hand pain, weakness, and numbness in right hand from carpal tunnel syndrome.      HPI related to upcoming procedure: patient needs surgery for right hand carpal tunnel syndrome.       Has GERD and anxiety     MEDICAL HISTORY:     Patient Active Problem List    Diagnosis Date Noted     Polymenorrhea 06/26/2013     Priority: Medium     Dysmenorrhea 06/26/2013     Priority: Medium     Ovarian cyst 06/26/2013     Priority: Medium     Family history of diabetes mellitus 06/15/2011     Priority: Medium     CARDIOVASCULAR SCREENING; LDL GOAL LESS THAN 160 10/31/2010     Priority: Medium     Insomnia      Priority: Medium     Anxiety      Priority: Medium     IBS (irritable bowel syndrome)      Priority: Medium      Past Medical History:   Diagnosis Date     Anxiety      History of diarrhea      IBS (irritable bowel syndrome)      Insomnia      Past Surgical History:   Procedure Laterality Date     BREAST SURGERY  3/2013    breast implants     SURGICAL HISTORY OF -   3/13    bilateral breast augmentations with implants     TUBAL LIGATION  4/07     Current Outpatient Prescriptions   Medication Sig Dispense Refill     ALPRAZolam (XANAX) 0.5 MG tablet Take 1 tablet (0.5 mg) by mouth as needed for anxiety 10 tablet 0     FLUoxetine (PROZAC) 40 MG capsule Take 1 capsule (40 mg) by mouth every morning 90 capsule 4     norgestrel-ethinyl estradiol (LO/OVRAL) 0.3-30 MG-MCG per tablet Take 1 tablet by mouth daily 84 tablet 4     omeprazole (PRILOSEC) 20 MG CR capsule Take 1 capsule (20 mg) by mouth daily 30 capsule 2     order for DME Equipment being ordered: bilateral wrist splints with thumb 2 each 0     traZODone (DESYREL) 50 MG tablet Take 1 tablet (50 mg) by mouth nightly as needed for sleep 90 tablet 4      triamterene-hydrochlorothiazide (DYAZIDE) 37.5-25 MG per capsule Take 1 capsule by mouth daily 90 capsule 3     OTC products: None, except as noted above    Allergies   Allergen Reactions     Lexapro      Increased anxiety       Zoloft      NIGHT SWEATS AND RASH FROM SWEATING AND INCREASE IN WEIGHT      Latex Allergy: NO    Social History   Substance Use Topics     Smoking status: Former Smoker     Packs/day: 0.30     Years: 18.00     Types: Cigarettes     Quit date: 7/1/2017     Smokeless tobacco: Never Used     Alcohol use Yes      Comment: SOCIALLY     History   Drug Use No     Immunization History   Administered Date(s) Administered     HEPA 12/01/2006, 06/22/2007, 02/15/2008     Historical DTP/aP 1975, 1974, 1974, 08/01/1975     Influenza (IIV3) PF 10/20/2012, 10/01/2014, 11/01/2016     Influenza Vaccine IM 3yrs+ 4 Valent IIV4 10/08/2015, 11/22/2016, 10/18/2017     MMR 02/13/1975, 12/11/1992     OPV, trivalent, live 1974, 1974, 08/01/1975     TD (ADULT, 7+) 07/24/2002     TDAP Vaccine (Adacel) 03/15/2010      REVIEW OF SYSTEMS:   CONSTITUTIONAL: NEGATIVE for fever, chills, change in weight  INTEGUMENTARY/SKIN: NEGATIVE for worrisome rashes, moles or lesions  EYES: NEGATIVE for vision changes or irritation  ENT/MOUTH: NEGATIVE for ear, mouth and throat problems  RESP: NEGATIVE for significant cough or SOB  BREAST: NEGATIVE for masses, tenderness or discharge  CV: NEGATIVE for chest pain, palpitations or peripheral edema  GI: NEGATIVE for nausea, abdominal pain, heartburn, or change in bowel habits  : NEGATIVE for frequency, dysuria, or hematuria  MUSCULOSKELETAL: NEGATIVE for significant arthralgias or myalgia  NEURO: NEGATIVE for weakness, dizziness or paresthesias  ENDOCRINE: NEGATIVE for temperature intolerance, skin/hair changes  HEME: NEGATIVE for bleeding problems  PSYCHIATRIC: NEGATIVE for changes in mood or affect    EXAM:   /80  Pulse 74  Temp 99.4  F (37.4  C)  "(Oral)  Ht 5' 5\" (1.651 m)  Wt 246 lb 8 oz (111.8 kg)  LMP 05/23/2018 (Exact Date)  SpO2 99%  BMI 41.02 kg/m2    GENERAL APPEARANCE: healthy, alert and no distress     EYES: EOMI, PERRL     HENT: ear canals and TM's normal and nose and mouth without ulcers or lesions     NECK: no adenopathy, no asymmetry, masses, or scars and thyroid normal to palpation     RESP: lungs clear to auscultation - no rales, rhonchi or wheezes     CV: regular rates and rhythm, normal S1 S2, no S3 or S4 and no murmur, click or rub     ABDOMEN:  soft, nontender, no HSM or masses and bowel sounds normal     MS: extremities normal- no gross deformities noted, no evidence of inflammation in joints, FROM in all extremities.     SKIN: no suspicious lesions or rashes     NEURO: Normal strength and tone, sensory exam grossly normal, mentation intact and speech normal     PSYCH: mentation appears normal. and affect normal/bright     LYMPHATICS: No cervical adenopathy    DIAGNOSTICS:   No labs or EKG required for low risk surgery (cataract, skin procedure, breast biopsy, etc)    Recent Labs   Lab Test  05/30/17   1024  12/22/16   1519   HGB  14.2  13.3   PLT  284  288   NA   --   139   POTASSIUM   --   3.8   CR   --   0.90        IMPRESSION:   Reason for surgery/procedure: right carpal tunnel syndrome   Diagnosis/reason for consult: need for clearance     The proposed surgical procedure is considered LOW risk.    REVISED CARDIAC RISK INDEX  The patient has the following serious cardiovascular risks for perioperative complications such as (MI, PE, VFib and 3  AV Block):  No serious cardiac risks  INTERPRETATION: 0 risks: Class I (very low risk - 0.4% complication rate)    The patient has the following additional risks for perioperative complications:  No identified additional risks      ICD-10-CM    1. Preop general physical exam Z01.818        RECOMMENDATIONS:     --Consult hospital rounder / IM to assist post-op medical management    --Patient " is to take all scheduled medications on the day of surgery EXCEPT for modifications listed below.    APPROVAL GIVEN to proceed with proposed procedure, without further diagnostic evaluation       Signed Electronically by: KRISTINA DUNLAP MD    Copy of this evaluation report is provided to requesting physician.    [place] Preop Guidelines    Revised Cardiac Risk Index

## 2018-06-21 NOTE — OP NOTE
OPERATIVE REPORT    DATE OF SERVICE:  6/21/2018      PREOPERATIVE DIAGNOSIS  Right carpal tunnel syndrome.      POSTOPERATIVE DIAGNOSIS  Right carpal tunnel syndrome.      NAME OF OPERATION  Right carpal tunnel release.     SURGEON  Moe Castro MD     FIRST ASSISTANT  Kev Valdes PA-C     ANESTHESIA: MAC with local    ESTIMATED BLOOD LOSS: 1mL    ANTIBIOTICS: cefazolin 2g iv , prior to incision    IVF: 500 ml LR.    FINDINGS: thick transverse carpal ligament, flattened median nerve, hyperemic changes of median nerve.    Tourniquet time: 7 minutes at 200 mmHg.    Complications: None apparent.    SPECIMENS: none    DRAINS: none    IMPLANTS: none    INDICATIONS  Megan Dowd is a 43 year old female with right carpal tunnel syndrome, confirmed on EMG. The symptoms have been quite bothersome.  Conservative treatment has failed, including night splints, therapy, NSAIDs and injections. Risks of surgery, including but not limited to: bleeding, infection, pain, scar, damage to adjacent structures (nerves, vessels), temporary vs permanent nerve damage, failure to relieve symptoms, recurrence of symptoms, need for further surgery, risks of anesthesia, and death were discussed. All questions were addressed to patient satisfaction. The patient elected to proceed with this surgery understanding the risks and perceived benefits. Informed consent was obtained for the procedure.       PROCEDURE IN DETAIL  The patient was identified in the preoperative holding area. The correct procedure and procedural site was confirmed with the patient. Consent was reviewed. The correct surgical site was marked with an indelible marker by the attending surgeon, Moe Castro MD.  The patient was then taken to the operating room and placed on the operating table in the supine position.  Tourniquet was placed around the proximal arm. All bony prominences well padded. The limb was prepped and draped in the usual sterile fashion.  IV  sedation was given by Anesthesia.  Local anesthetic using a combination of 0.25% Marcaine and 1% lidocaine was injected in the anticipated incision site.  The limb was exsanguinated and tourniquet inflated to 200 mmHg.    A longitudinal incision was made in the usual location at the base of the palm just ulnar to midline and the palmaris longus.  The incision was taken down through the skin and subcutaneous tissue carefully to the level of the palmar fascia.  The palmar fascia was then carefully incised, and the transverse carpal ligament was then exposed. The transverse carpal ligament was incised from distal to proximal under direct vision while protecting underlying structures with an elevator.  The transverse carpal ligament was tight and thick.  Proximally, we protected the underlying structures using a clamp, and then completed the ligament incision using a tenotomy scissors.  The distal volar forearm fascia was also incised longitudinally for a segment of approximately 3cm. The median nerve was completely exposed and was noted to have flattened, hyperemic appearance. Once I felt that the transverse carpal ligament was released completely, I used my small finger as a probe to make sure that it was in fact completely released, as it was. The wound was copiously irrigated with normal saline and the tourniquet deflated.  Hemostasis was achieved with electrocautery.  Then, 4-0 nylon sutures placed in a horizontal mattress fashion were used to close the skin, and a sterile dressing was applied followed by a volar splint. The patient was then transferred to the hospital cart and to the recovery area in stable condition. There were no apparent complications.    Postop plan will be partial weight-bearing of right upper extremity. Splint to be in place at all times until follow-up in 10-14 days. Oral pain medications will be provided. Elevation of right upper extremity at all times to reduce swelling. Finger range of  motion.    Moe Castro M.D., M.S.  Dept. of Orthopaedic Surgery  [facility]

## 2018-06-21 NOTE — INTERVAL H&P NOTE
The History and Physical on patient's chart was personally reviewed today with the patient. there have been no interval changes in patient's history since H+P performed.    History:  Megan Dowd is a 43 year old female , right -hand dominant with bilateral hand pain, numbness and tingling that started years ago, worsening 7/1/2017. R>L. Symptoms affect the radial three fingers of both hands. She has severe pain today, rated a 7/10. She has pain during the day with use. At night, her hands fall asleep with throbbing sensations. She was given splints to wear. These splints help at night, but with no relief during the day. She has not had any other treatments. Denies neck problems. Also pain in the thumbs, palms.        Suspected cause: Due to unknown activities.    Pain severity: 7/10  Pain quality: aching and throbbing  Frequency of symptoms: frequently.  Aggravating Factors: with use, at night.  Relieving Factors: at rest, with wrist splints.  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 Olea, [facility].  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         ASSESSMENT: 43  Year old female with bilateral carpal tunnel syndrome      PLAN:     * discussed with patient signs and symptoms consistent with carpal tunnel syndrome. Carpal tunnel syndrome is compression or pinching of the median nerve at the wrist, as it enters the hand. There are many different causes, and in most cases, multifactorial.     * An indepth discussion was had with her about the options for treatment, which included activity modification to avoid aggravating activities, taking breaks during  activities that cause symptoms, stretching, NSAIDS to help decrease inflammation and swelling within the carpal tunnel, night splinting, corticosteroid injections, and carpal tunnel release.   * depending upon severity and duration of symptoms, nonoperative treatment is usually initiated, starting with least invasive modalities such as activity modification and a trial of night splints and NSAIDs.  * Cortisone injections are considered to decrease swelling and inflammation within the carpal tunnel and compression of the nerve.   * Lastly, carpal tunnel release should symptoms persist despite trial of nonoperative treatment, or in cases of severe carpal tunnel syndrome.  * risks of surgery, including, but not limited to: bleeding, infection, pain, scar, damage to adjacent structures (nerves, vessels, tendons), temporary versus permanent nerve injury, failure to relieve symptoms, recurrence of symptoms, incomplete release, stiffness, scar sensitivity and tenderness, need for further surgery, risks of anesthesia were discussed.  * in some cases, with severe, prolonged symptoms, or in situations of underlying peripheral neuropathy, there may be permanent nerve changes not amenable to surgery, that even with surgery, may not resolve.      * at this time, understanding the risks, patient elects for surgery, starting on right side today.    * plan: right open carpal tunnel release, outpatient procedure, MAC with local anesthesia.    Risks and perceived benefits of surgery again discussed with patient. Patient's questions addressed and answered. Written informed consent obtained and reviewed. Surgical site marked with indelible marker with patient's participation after confirming site with patient.      Moe Castro M.D., M.S.  Dept. of Orthopaedic Surgery  [facility]

## 2018-06-21 NOTE — IP AVS SNAPSHOT
[facility]    [address] [place] [address]    Phone:  [phone]                                       After Visit Summary   6/21/2018    Megan Dowd    MRN: [id number]           After Visit Summary Signature Page     I have received my discharge instructions, and my questions have been answered. I have discussed any challenges I see with this plan with the nurse or doctor.    ..........................................................................................................................................  Patient/Patient Representative Signature      ..........................................................................................................................................  Patient Representative Print Name and Relationship to Patient    ..................................................               ................................................  Date                                            Time    ..........................................................................................................................................  Reviewed by Signature/Title    ...................................................              ..............................................  Date                                                            Time

## 2018-06-25 ENCOUNTER — TELEPHONE (OUTPATIENT)
Dept: FAMILY MEDICINE | Facility: CLINIC | Age: 43
End: 2018-06-25

## 2018-06-25 NOTE — TELEPHONE ENCOUNTER
Reason for call: request  Patient called regarding (reason for call): eJ[employer] is asking for office notes and documentation for short term disability claim   Additional comments: He will be faxing something over    Phone number to reach patient: [phone]  Best Time:  1030-7 Central time    Can we leave a detailed message on this number?  YES

## 2018-06-27 NOTE — TELEPHONE ENCOUNTER
Documents were received, filled out, faxed ([phone]) and sent to abstracting.    Kev Valdes PA-C, BLAIR (Ortho)  Supervising Physician: Moe Castro M.D., M.S.  Dept. of Orthopaedic Surgery  [facility]

## 2018-07-11 ENCOUNTER — OFFICE VISIT (OUTPATIENT)
Dept: ORTHOPEDICS | Facility: CLINIC | Age: 43
End: 2018-07-11
Payer: COMMERCIAL

## 2018-07-11 VITALS
BODY MASS INDEX: 40.35 KG/M2 | SYSTOLIC BLOOD PRESSURE: 116 MMHG | HEIGHT: 65 IN | DIASTOLIC BLOOD PRESSURE: 84 MMHG | WEIGHT: 242.2 LBS | HEART RATE: 70 BPM

## 2018-07-11 DIAGNOSIS — Z98.890 S/P CARPAL TUNNEL RELEASE: Primary | ICD-10-CM

## 2018-07-11 PROCEDURE — 99024 POSTOP FOLLOW-UP VISIT: CPT | Performed by: ORTHOPAEDIC SURGERY

## 2018-07-11 ASSESSMENT — PAIN SCALES - GENERAL: PAINLEVEL: NO PAIN (0)

## 2018-07-11 NOTE — PROGRESS NOTES
Chief Complaint   Patient presents with     Right Wrist - Surgical Followup     Carpal tunnel release. DOS 6/21/18, 3 wk PO. Patient states her wrist is doing well. Denies any N/T in her fingers. She has remained in the splint but took it off on 7/7/18 and took the stiches out.       SURGERY: right wrist carpal tunnel release.  DATE OF SURGERY: 6/21/2018      HPI: Megan Dowd is a 43 year old female , right -hand dominant, who presents for post-surgical follow-up of a right carpal tunnel release. It has now been 3 weeks since surgery. She has remained in the splint, however removed the splint to have her stitches removed on 7/7/2018, 4 days ago. 0/10 pain. Denies fevers, chills or night sweats. There have been no wound problems. She has been doing active/passive finger range of motion exercises. She reports having mild pain/discomfort around the surgical site. She states that since surgery, preop symptoms have resolved. She reports the fingers no longer get numb or cramp up.       PAST MEDICAL HISTORY:   Past Medical History:   Diagnosis Date     Anxiety      Gastroesophageal reflux disease      History of diarrhea      IBS (irritable bowel syndrome)      Insomnia      Motion sickness        PAST SURGICAL HISTORY:   Past Surgical History:   Procedure Laterality Date     BREAST SURGERY  3/2013    breast implants     RELEASE CARPAL TUNNEL Right 6/21/2018    Procedure: RELEASE CARPAL TUNNEL;  Right carpal tunnel release;  Surgeon: Moe Castro MD;  Location: [facility] OR     SURGICAL HISTORY OF -   3/13    bilateral breast augmentations with implants     TUBAL LIGATION  4/07       MEDICATIONS:    Current Outpatient Prescriptions   Medication Sig Dispense Refill     ALPRAZolam (XANAX) 0.5 MG tablet Take 1 tablet (0.5 mg) by mouth as needed for anxiety 10 tablet 0     FLUoxetine (PROZAC) 40 MG capsule Take 1 capsule (40 mg) by mouth every morning 90 capsule 4     norgestrel-ethinyl estradiol (LO/OVRAL) 0.3-30 MG-MCG  "per tablet Take 1 tablet by mouth daily 84 tablet 4     omeprazole (PRILOSEC) 20 MG CR capsule Take 1 capsule (20 mg) by mouth daily 30 capsule 2     order for DME Equipment being ordered: bilateral wrist splints with thumb 2 each 0     traZODone (DESYREL) 50 MG tablet Take 1 tablet (50 mg) by mouth nightly as needed for sleep 90 tablet 4     triamterene-hydrochlorothiazide (DYAZIDE) 37.5-25 MG per capsule Take 1 capsule by mouth daily 90 capsule 3        ALLERGIES:   Allergies   Allergen Reactions     Lexapro      Increased anxiety       Zoloft      NIGHT SWEATS AND RASH FROM SWEATING AND INCREASE IN WEIGHT     ROS:   Denies numbness, tingling, parasthesias.   Denies headaches.   Denies fevers, chills, night sweats   Denies chest pain.   Denies shortness of breath.   Denies any skin problems, abrasions, rashes, irritation.     This document serves as a record of the services and decisions personally performed and made by Moe Castro MD. It was created on his behalf by Martha Martin, a trained medical scribe. The creation of this document is based the provider's statements to the medical scribe.    Scribe Martha Martin 8:05 AM 7/11/2018       PHYSICAL EXAM  GENERAL APPEARANCE: healthy, alert, no distress.   SKIN: no suspicious lesions or rashes  RESPIRATORY: No increased work of breathing.  NEURO: Normal strength and tone, mentation intact and speech normal  PSYCH:  mentation appears normal and affect normal/bright. Not anxious.        /84  Pulse 70  Ht 1.651 m (5' 5\")  Wt 109.9 kg (242 lb 3.2 oz)  BMI 40.3 kg/m2     HAND / WRIST EXAM:    The splint was removed.  Volar incision healing well with skin edges well approximated  No erythema. No drainage.    There is mild swelling of the volar wrist.  There is mild tenderness over the incisions.  There is diffuse ecchymosis over the distal forearm, wrist, and proximal palm  There is no erythema of the surrounding skin.  There is no maceration of the skin.  There " is no deformity in the area.  Range of motion: good range of motion and (any)movements are painful. Stiff.     Intact sensation to light touch in the distribution of the median, radial, and ulnar nerves of the hand. Intact sensation of the radial and ulnar digital nerves of all fingers.  Brisk capillary refill to all fingers, warm and well-perfused.   Palpable radial pulse.    ASSESSMENT: 43 year old female, 3 weeks status post right carpal tunnel release , doing well.      PLAN: routine postoperative of carpal tunnel release.    * Explained that swelling, tightness, and bruising are all normal after surgery.  * May wash hands, no aggressive scrubbing for another week  * Give another week incisions to heal before swimming.   * Continue with hand and wrist exercises for motion.  * Scar massage and desensitization discussed and demonstrated.  * Gradual return to light use of hands for ADLs. No aggressive hand use for another 4 weeks.  * Return to clinic as needed. Recommended she call and schedule left carpal tunnel release when she would like it done.   * It may take 6 months or longer for symptoms to resolve, and in cases of severe carpal tunnel syndrome, symptoms may not completely improve.      The information in this document, created by a scribe for me, accurately reflects the services I personally performed and the decisions made by me. I have reviewed and approved this document for accuracy.      Moe Castro M.D., M.S.  Dept. of Orthopaedic Surgery  [facility]

## 2018-07-11 NOTE — MR AVS SNAPSHOT
"              After Visit Summary   7/11/2018    Megan Dowd    MRN: [id number]           Patient Information     Date Of Birth          1975        Visit Information        Provider Department      7/11/2018 8:00 AM Moe Castro MD [facility]        Today's Diagnoses     S/P carpal tunnel release    -  1       Follow-ups after your visit        Follow-up notes from your care team     Return if symptoms worsen or fail to improve.      Who to contact     If you have questions or need follow up information about today's clinic visit or your schedule please contact [facility] directly at [phone].  Normal or non-critical lab and imaging results will be communicated to you by MyChart, letter or phone within 4 business days after the clinic has received the results. If you do not hear from us within 7 days, please contact the clinic through [employer] or phone. If you have a critical or abnormal lab result, we will notify you by phone as soon as possible.  Submit refill requests through [employer] or call your pharmacy and they will forward the refill request to us. Please allow 3 business days for your refill to be completed.          Additional Information About Your Visit        MyChart Information     [employer] gives you secure access to your electronic health record. If you see a primary care provider, you can also send messages to your care team and make appointments. If you have questions, please call your primary care clinic.  If you do not have a primary care provider, please call [phone] and they will assist you.        Care EveryWhere ID     This is your Care EveryWhere ID. This could be used by other organizations to access your [place] medical records  [id number]        Your Vitals Were     Pulse Height BMI (Body Mass Index)             70 5' 5\" (1.651 m) 40.3 kg/m2          Blood Pressure from Last 3 Encounters:   07/11/18 116/84   06/21/18 113/77   06/15/18 " 120/80    Weight from Last 3 Encounters:   07/11/18 242 lb 3.2 oz (109.9 kg)   06/15/18 246 lb 8 oz (111.8 kg)   05/25/18 241 lb (109.3 kg)              Today, you had the following     No orders found for display       Primary Care Provider Office Phone # Fax #    Emilee Ulloa [phone] [address] [facility] [address]        Equal Access to Services     MAGAN SCHMID : Hadii aad ku hadasho Soomaali, waaxda luqadaha, qaybta kaalmada adeegyada, waxay idiin hayaan kari alex . So [facility] [phone].    ATENCIÓN: Si habla español, tiene a casey disposición servicios gratuitos de asistencia lingüística. [facility] [phone].    We comply with applicable federal civil rights laws and [place] laws. We do not discriminate on the basis of race, color, national origin, age, disability, sex, sexual orientation, or gender identity.            Thank you!     Thank you for choosing [facility]  for your care. Our goal is always to provide you with excellent care. Hearing back from our patients is one way we can continue to improve our services. Please take a few minutes to complete the written survey that you may receive in the mail after your visit with us. Thank you!             Your Updated Medication List - Protect others around you: Learn how to safely use, store and throw away your medicines at www.disposemymeds.org.          This list is accurate as of 7/11/18  1:05 PM.  Always use your most recent med list.                   Brand Name Dispense Instructions for use Diagnosis    ALPRAZolam 0.5 MG tablet    XANAX    10 tablet    Take 1 tablet (0.5 mg) by mouth as needed for anxiety    Anxiety       FLUoxetine 40 MG capsule    PROZAC    90 capsule    Take 1 capsule (40 mg) by mouth every morning    Anxiety       norgestrel-ethinyl estradiol 0.3-30 MG-MCG per tablet    LO/OVRAL    84 tablet    Take 1 tablet by mouth daily    Polymenorrhea       omeprazole  20 MG CR capsule    priLOSEC    30 capsule    Take 1 capsule (20 mg) by mouth daily    RUQ abdominal pain       order for DME     2 each    Equipment being ordered: bilateral wrist splints with thumb    Bilateral carpal tunnel syndrome       traZODone 50 MG tablet    DESYREL    90 tablet    Take 1 tablet (50 mg) by mouth nightly as needed for sleep    Primary insomnia       triamterene-hydrochlorothiazide 37.5-25 MG per capsule    DYAZIDE    90 capsule    Take 1 capsule by mouth daily    Bilateral carpal tunnel syndrome

## 2018-07-11 NOTE — LETTER
7/11/2018         RE: Megan Dowd  [address] [address]        Dear Colleague,    Thank you for referring your patient, Megan Dowd, to the [facility]. Please see a copy of my visit note below.    Chief Complaint   Patient presents with     Right Wrist - Surgical Followup     Carpal tunnel release. DOS 6/21/18, 3 wk PO. Patient states her wrist is doing well. Denies any N/T in her fingers. She has remained in the splint but took it off on 7/7/18 and took the stiches out.       SURGERY: right wrist carpal tunnel release.  DATE OF SURGERY: 6/21/2018      HPI: Megan Dowd is a 43 year old female , right -hand dominant, who presents for post-surgical follow-up of a right carpal tunnel release. It has now been 3 weeks since surgery. She has remained in the splint, however removed the splint to have her stitches removed on 7/7/2018, 4 days ago. 0/10 pain. Denies fevers, chills or night sweats. There have been no wound problems. She has been doing active/passive finger range of motion exercises. She reports having mild pain/discomfort around the surgical site. She states that since surgery, preop symptoms have resolved. She reports the fingers no longer get numb or cramp up.       PAST MEDICAL HISTORY:   Past Medical History:   Diagnosis Date     Anxiety      Gastroesophageal reflux disease      History of diarrhea      IBS (irritable bowel syndrome)      Insomnia      Motion sickness        PAST SURGICAL HISTORY:   Past Surgical History:   Procedure Laterality Date     BREAST SURGERY  3/2013    breast implants     RELEASE CARPAL TUNNEL Right 6/21/2018    Procedure: RELEASE CARPAL TUNNEL;  Right carpal tunnel release;  Surgeon: Moe Castro MD;  Location: [facility] OR     SURGICAL HISTORY OF -   3/13    bilateral breast augmentations with implants     TUBAL LIGATION  4/07       MEDICATIONS:    Current Outpatient Prescriptions   Medication Sig Dispense Refill      "ALPRAZolam (XANAX) 0.5 MG tablet Take 1 tablet (0.5 mg) by mouth as needed for anxiety 10 tablet 0     FLUoxetine (PROZAC) 40 MG capsule Take 1 capsule (40 mg) by mouth every morning 90 capsule 4     norgestrel-ethinyl estradiol (LO/OVRAL) 0.3-30 MG-MCG per tablet Take 1 tablet by mouth daily 84 tablet 4     omeprazole (PRILOSEC) 20 MG CR capsule Take 1 capsule (20 mg) by mouth daily 30 capsule 2     order for DME Equipment being ordered: bilateral wrist splints with thumb 2 each 0     traZODone (DESYREL) 50 MG tablet Take 1 tablet (50 mg) by mouth nightly as needed for sleep 90 tablet 4     triamterene-hydrochlorothiazide (DYAZIDE) 37.5-25 MG per capsule Take 1 capsule by mouth daily 90 capsule 3        ALLERGIES:   Allergies   Allergen Reactions     Lexapro      Increased anxiety       Zoloft      NIGHT SWEATS AND RASH FROM SWEATING AND INCREASE IN WEIGHT     ROS:   Denies numbness, tingling, parasthesias.   Denies headaches.   Denies fevers, chills, night sweats   Denies chest pain.   Denies shortness of breath.   Denies any skin problems, abrasions, rashes, irritation.     This document serves as a record of the services and decisions personally performed and made by Moe Castro MD. It was created on his behalf by Martha Martin, a trained medical scribe. The creation of this document is based the provider's statements to the medical scribe.    Scribe Martha Martin 8:05 AM 7/11/2018       PHYSICAL EXAM  GENERAL APPEARANCE: healthy, alert, no distress.   SKIN: no suspicious lesions or rashes  RESPIRATORY: No increased work of breathing.  NEURO: Normal strength and tone, mentation intact and speech normal  PSYCH:  mentation appears normal and affect normal/bright. Not anxious.        /84  Pulse 70  Ht 1.651 m (5' 5\")  Wt 109.9 kg (242 lb 3.2 oz)  BMI 40.3 kg/m2     HAND / WRIST EXAM:    The splint was removed.  Volar incision healing well with skin edges well approximated  No erythema. No drainage.    There " is mild swelling of the volar wrist.  There is mild tenderness over the incisions.  There is diffuse ecchymosis over the distal forearm, wrist, and proximal palm  There is no erythema of the surrounding skin.  There is no maceration of the skin.  There is no deformity in the area.  Range of motion: good range of motion and (any)movements are painful. Stiff.     Intact sensation to light touch in the distribution of the median, radial, and ulnar nerves of the hand. Intact sensation of the radial and ulnar digital nerves of all fingers.  Brisk capillary refill to all fingers, warm and well-perfused.   Palpable radial pulse.    ASSESSMENT: 43 year old female, 3 weeks status post right carpal tunnel release , doing well.      PLAN: routine postoperative of carpal tunnel release.    * Explained that swelling, tightness, and bruising are all normal after surgery.  * May wash hands, no aggressive scrubbing for another week  * Give another week incisions to heal before swimming.   * Continue with hand and wrist exercises for motion.  * Scar massage and desensitization discussed and demonstrated.  * Gradual return to light use of hands for ADLs. No aggressive hand use for another 4 weeks.  * Return to clinic as needed. Recommended she call and schedule left carpal tunnel release when she would like it done.   * It may take 6 months or longer for symptoms to resolve, and in cases of severe carpal tunnel syndrome, symptoms may not completely improve.      The information in this document, created by a scribe for me, accurately reflects the services I personally performed and the decisions made by me. I have reviewed and approved this document for accuracy.      Moe Castro M.D., M.S.  Dept. of Orthopaedic Surgery  [facility]      Again, thank you for allowing me to participate in the care of your patient.        Sincerely,        Moe Castro MD

## 2019-04-05 NOTE — PATIENT INSTRUCTIONS
Stop aspirin, ibuprofen, aleve, fish oil 7 days before surgery.   Okay to take prozac, multivitamin, and birth control on morning of surgery.    Before Your Surgery      Call your surgeon if there is any change in your health. This includes signs of a cold or flu (such as a sore throat, runny nose, cough, rash or fever).    Do not smoke, drink alcohol or take over the counter medicine (unless your surgeon or primary care doctor tells you to) for the 24 hours before and after surgery.    If you take prescribed drugs: Follow your doctor s orders about which medicines to take and which to stop until after surgery.    Eating and drinking prior to surgery: follow the instructions from your surgeon    Take a shower or bath the night before surgery. Use the soap your surgeon gave you to gently clean your skin. If you do not have soap from your surgeon, use your regular soap. Do not shave or scrub the surgery site.  Wear clean pajamas and have clean sheets on your bed.

## 2019-04-05 NOTE — PROGRESS NOTES
[facility]  [address] [place] [address]  [place] [address]  [phone]  Dept: [phone]    PRE-OP EVALUATION:  Today's date: 2019    Megan Dowd (: 1975) presents for pre-operative evaluation assessment as requested by Dr. Sierra.  She requires evaluation and anesthesia risk assessment prior to undergoing surgery/procedure for treatment of morbid obesity .    Fax number for surgical facility: [phone]  Primary Physician: Geneva Bray  Type of Anesthesia Anticipated: General    Patient has a Health Care Directive or Living Will:  YES     Preop Questions 2019   Who is doing your surgery? dr sierra   What are you having done? rny   Date of Surgery/Procedure: 2019   Facility or [facility] where [facility]   1.  Do you have a history of Heart attack, stroke, stent, coronary bypass surgery, or other heart surgery? No   2.  Do you ever have any pain or discomfort in your chest? No   3.  Do you have a history of  Heart Failure? No   4.   Are you troubled by shortness of breath when:  walking on a level surface, or up a slight hill, or at night? No   5.  Do you currently have a cold, bronchitis or other respiratory infection? No   6.  Do you have a cough, shortness of breath, or wheezing? No   7.  Do you sometimes get pains in the calves of your legs when you walk? No   8. Do you or anyone in your family have previous history of blood clots? No   9.  Do you or does anyone in your family have a serious bleeding problem such as prolonged bleeding following surgeries or cuts? No   10. Have you ever had problems with anemia or been told to take iron pills? No   11. Have you had any abnormal blood loss such as black, tarry or bloody stools, or abnormal vaginal bleeding? No   12. Have you ever had a blood transfusion? No   13. Have you or any of your relatives ever had problems with anesthesia? YES - sister gets nausea   14. Do you have sleep  apnea, excessive snoring or daytime drowsiness? No   15. Do you have any prosthetic heart valves? No   16. Do you have prosthetic joints? No   17. Is there any chance that you may be pregnant? No- has had tubal ligation, on OCP     Geneva Brya CNP     HPI:     HPI related to upcoming procedure: Patient will undergo gastric bypass to help with weight loss.      See problem list for active medical problems.  Problems all longstanding and stable, except as noted/documented.  See ROS for pertinent symptoms related to these conditions.                                                                                                                                                          .  SLEEP PROBLEM - Patient has a longstanding history of insomnia.. Patient has tried OTC medications with limited success.                                                                                                                                         .    MEDICAL HISTORY:     Patient Active Problem List    Diagnosis Date Noted     Morbid obesity (H) 04/08/2019     Priority: Medium     Polymenorrhea 06/26/2013     Priority: Medium     Dysmenorrhea 06/26/2013     Priority: Medium     Ovarian cyst 06/26/2013     Priority: Medium     Family history of diabetes mellitus 06/15/2011     Priority: Medium     CARDIOVASCULAR SCREENING; LDL GOAL LESS THAN 160 10/31/2010     Priority: Medium     Insomnia      Priority: Medium     Anxiety      Priority: Medium     IBS (irritable bowel syndrome)      Priority: Medium      Past Medical History:   Diagnosis Date     Anxiety      Gastroesophageal reflux disease      History of diarrhea      IBS (irritable bowel syndrome)      Insomnia      Motion sickness      Past Surgical History:   Procedure Laterality Date     BREAST SURGERY  3/2013    breast implants     RELEASE CARPAL TUNNEL Right 6/21/2018    Procedure: RELEASE CARPAL TUNNEL;  Right carpal tunnel release;  Surgeon: Moe Castro,  "MD;  Location:  OR     SURGICAL HISTORY OF -   3/13    bilateral breast augmentations with implants     TUBAL LIGATION       Current Outpatient Medications   Medication Sig Dispense Refill     ALPRAZolam (XANAX) 0.5 MG tablet Take 1 tablet (0.5 mg) by mouth as needed for anxiety 10 tablet 0     FLUoxetine (PROZAC) 40 MG capsule Take 1 capsule (40 mg) by mouth every morning 90 capsule 3     norgestrel-ethinyl estradiol (LO/OVRAL) 0.3-30 MG-MCG tablet Take 1 tablet by mouth daily 84 tablet 4     omeprazole (PRILOSEC) 20 MG CR capsule Take 1 capsule (20 mg) by mouth daily 30 capsule 2     order for DME Equipment being ordered: bilateral wrist splints with thumb 2 each 0     traZODone (DESYREL) 50 MG tablet Take 1 tablet (50 mg) by mouth nightly as needed for sleep 90 tablet 3     OTC products: None, except as noted above    Allergies   Allergen Reactions     Lexapro      Increased anxiety       Zoloft      NIGHT SWEATS AND RASH FROM SWEATING AND INCREASE IN WEIGHT      Latex Allergy: NO    Social History     Tobacco Use     Smoking status: Former Smoker     Packs/day: 0.30     Years: 18.00     Pack years: 5.40     Types: Cigarettes     Last attempt to quit: 2017     Years since quittin.7     Smokeless tobacco: Never Used   Substance Use Topics     Alcohol use: Yes     Comment: SOCIALLY     History   Drug Use No       REVIEW OF SYSTEMS:   Constitutional, neuro, ENT, endocrine, pulmonary, cardiac, gastrointestinal, genitourinary, musculoskeletal, integument and psychiatric systems are negative, except as otherwise noted.    EXAM:   /68   Pulse 84   Temp 98.4  F (36.9  C) (Oral)   Resp 20   Ht 1.651 m (5' 5\")   Wt 115.6 kg (254 lb 12.8 oz)   LMP 2019   SpO2 100%   Breastfeeding? No   BMI 42.40 kg/m      GENERAL APPEARANCE: healthy, alert and no distress     EYES: EOMI, PERRL     HENT: ear canals and TM's normal and nose and mouth without ulcers or lesions     NECK: no adenopathy, no " asymmetry, masses, or scars and thyroid normal to palpation     RESP: lungs clear to auscultation - no rales, rhonchi or wheezes     CV: regular rates and rhythm, normal S1 S2, no S3 or S4 and no murmur, click or rub     ABDOMEN:  soft, nontender, no HSM or masses and bowel sounds normal     MS: extremities normal- no gross deformities noted, no evidence of inflammation in joints, FROM in all extremities.     SKIN: no suspicious lesions or rashes     NEURO: Normal strength and tone, sensory exam grossly normal, mentation intact and speech normal     PSYCH: mentation appears normal. and affect normal/bright     LYMPHATICS: No cervical adenopathy    DIAGNOSTICS:     EKG: Not indicated due to non-vascular surgery and low risk of event (age <65 and without cardiac risk factors)  Labs Resulted Today:   Results for orders placed or performed in visit on 04/08/19   CBC with platelets and differential   Result Value Ref Range    WBC 8.7 4.0 - 11.0 10e9/L    RBC Count 4.15 3.8 - 5.2 10e12/L    Hemoglobin 12.7 11.7 - 15.7 g/dL    Hematocrit 39.5 35.0 - 47.0 %    MCV 95 78 - 100 fl    MCH 30.6 26.5 - 33.0 pg    MCHC 32.2 31.5 - 36.5 g/dL    RDW 12.8 10.0 - 15.0 %    Platelet Count 271 150 - 450 10e9/L    % Neutrophils 69.9 %    % Lymphocytes 21.4 %    % Monocytes 7.5 %    % Eosinophils 1.0 %    % Basophils 0.2 %    Absolute Neutrophil 6.1 1.6 - 8.3 10e9/L    Absolute Lymphocytes 1.9 0.8 - 5.3 10e9/L    Absolute Monocytes 0.7 0.0 - 1.3 10e9/L    Absolute Eosinophils 0.1 0.0 - 0.7 10e9/L    Absolute Basophils 0.0 0.0 - 0.2 10e9/L    Diff Method Automated Method        Recent Labs   Lab Test 05/30/17  1024 12/22/16  1519   HGB 14.2 13.3    288   NA  --  139   POTASSIUM  --  3.8   CR  --  0.90        IMPRESSION:   Reason for surgery/procedure: Morbid obesity  Diagnosis/reason for consult: Management of comorbid conditions and preoperative exam.      The proposed surgical procedure is considered INTERMEDIATE risk.    REVISED  CARDIAC RISK INDEX  The patient has the following serious cardiovascular risks for perioperative complications such as (MI, PE, VFib and 3  AV Block):  No serious cardiac risks  INTERPRETATION: 0 risks: Class I (very low risk - 0.4% complication rate)    The patient has the following additional risks for perioperative complications:  Morbid obesity      ICD-10-CM    1. Preop general physical exam Z01.818 CBC with platelets and differential   2. Morbid obesity (H) E66.01 CBC with platelets and differential   3. Anxiety F41.9 ALPRAZolam (XANAX) 0.5 MG tablet     FLUoxetine (PROZAC) 40 MG capsule   4. Primary insomnia F51.01 traZODone (DESYREL) 50 MG tablet   5. Polymenorrhea N92.0 norgestrel-ethinyl estradiol (LO/OVRAL) 0.3-30 MG-MCG tablet   6. Hypervitaminosis D E67.3 Vitamin D Deficiency   7. Encounter for screening mammogram for breast cancer Z12.31 *MA Screening Digital Bilateral     Anxiety is stable on current meds per patient.    RECOMMENDATIONS:     --Consult hospital rounder / IM to assist post-op medical management    Cardiovascular Risk  Performs 4 METs exercise without symptoms (Light housework (dusting, washing dishes) and Climb a flight of stairs) .       --Patient is to take all scheduled medications on the day of surgery .      APPROVAL GIVEN to proceed with proposed procedure, without further diagnostic evaluation       Signed Electronically by: ANDREW Waterman CNP    Copy of this evaluation report is provided to requesting physician.    [facility] Preop Guidelines    Revised Cardiac Risk Index

## 2019-04-08 ENCOUNTER — OFFICE VISIT (OUTPATIENT)
Dept: FAMILY MEDICINE | Facility: CLINIC | Age: 44
End: 2019-04-08
Payer: COMMERCIAL

## 2019-04-08 VITALS
HEIGHT: 65 IN | HEART RATE: 84 BPM | BODY MASS INDEX: 42.45 KG/M2 | TEMPERATURE: 98.4 F | OXYGEN SATURATION: 100 % | WEIGHT: 254.8 LBS | SYSTOLIC BLOOD PRESSURE: 110 MMHG | DIASTOLIC BLOOD PRESSURE: 68 MMHG | RESPIRATION RATE: 20 BRPM

## 2019-04-08 DIAGNOSIS — E67.3 HYPERVITAMINOSIS D: ICD-10-CM

## 2019-04-08 DIAGNOSIS — N92.0 POLYMENORRHEA: ICD-10-CM

## 2019-04-08 DIAGNOSIS — Z01.818 PREOP GENERAL PHYSICAL EXAM: Primary | ICD-10-CM

## 2019-04-08 DIAGNOSIS — Z12.31 ENCOUNTER FOR SCREENING MAMMOGRAM FOR BREAST CANCER: ICD-10-CM

## 2019-04-08 DIAGNOSIS — F51.01 PRIMARY INSOMNIA: ICD-10-CM

## 2019-04-08 DIAGNOSIS — F41.9 ANXIETY: ICD-10-CM

## 2019-04-08 DIAGNOSIS — E66.01 MORBID OBESITY (H): ICD-10-CM

## 2019-04-08 LAB
BASOPHILS # BLD AUTO: 0 10E9/L (ref 0–0.2)
BASOPHILS NFR BLD AUTO: 0.2 %
DEPRECATED CALCIDIOL+CALCIFEROL SERPL-MC: 49 UG/L (ref 20–75)
DIFFERENTIAL METHOD BLD: NORMAL
EOSINOPHIL # BLD AUTO: 0.1 10E9/L (ref 0–0.7)
EOSINOPHIL NFR BLD AUTO: 1 %
ERYTHROCYTE [DISTWIDTH] IN BLOOD BY AUTOMATED COUNT: 12.8 % (ref 10–15)
HCT VFR BLD AUTO: 39.5 % (ref 35–47)
HGB BLD-MCNC: 12.7 G/DL (ref 11.7–15.7)
LYMPHOCYTES # BLD AUTO: 1.9 10E9/L (ref 0.8–5.3)
LYMPHOCYTES NFR BLD AUTO: 21.4 %
MCH RBC QN AUTO: 30.6 PG (ref 26.5–33)
MCHC RBC AUTO-ENTMCNC: 32.2 G/DL (ref 31.5–36.5)
MCV RBC AUTO: 95 FL (ref 78–100)
MONOCYTES # BLD AUTO: 0.7 10E9/L (ref 0–1.3)
MONOCYTES NFR BLD AUTO: 7.5 %
NEUTROPHILS # BLD AUTO: 6.1 10E9/L (ref 1.6–8.3)
NEUTROPHILS NFR BLD AUTO: 69.9 %
PLATELET # BLD AUTO: 271 10E9/L (ref 150–450)
RBC # BLD AUTO: 4.15 10E12/L (ref 3.8–5.2)
WBC # BLD AUTO: 8.7 10E9/L (ref 4–11)

## 2019-04-08 PROCEDURE — 82306 VITAMIN D 25 HYDROXY: CPT | Performed by: NURSE PRACTITIONER

## 2019-04-08 PROCEDURE — 36415 COLL VENOUS BLD VENIPUNCTURE: CPT | Performed by: NURSE PRACTITIONER

## 2019-04-08 PROCEDURE — 99214 OFFICE O/P EST MOD 30 MIN: CPT | Performed by: NURSE PRACTITIONER

## 2019-04-08 PROCEDURE — 85025 COMPLETE CBC W/AUTO DIFF WBC: CPT | Performed by: NURSE PRACTITIONER

## 2019-04-08 RX ORDER — TRAZODONE HYDROCHLORIDE 50 MG/1
50 TABLET, FILM COATED ORAL
Qty: 90 TABLET | Refills: 3 | Status: SHIPPED | OUTPATIENT
Start: 2019-04-08 | End: 2020-07-21

## 2019-04-08 RX ORDER — FLUOXETINE 40 MG/1
40 CAPSULE ORAL EVERY MORNING
Qty: 90 CAPSULE | Refills: 3 | Status: SHIPPED | OUTPATIENT
Start: 2019-04-08 | End: 2019-07-12

## 2019-04-08 RX ORDER — ALPRAZOLAM 0.5 MG
0.5 TABLET ORAL PRN
Qty: 10 TABLET | Refills: 0 | Status: SHIPPED | OUTPATIENT
Start: 2019-04-08 | End: 2020-03-16

## 2019-04-08 ASSESSMENT — PAIN SCALES - GENERAL: PAINLEVEL: NO PAIN (0)

## 2019-04-08 ASSESSMENT — MIFFLIN-ST. JEOR: SCORE: 1801.65

## 2019-04-09 NOTE — RESULT ENCOUNTER NOTE
Dear Megan,    Your recent test results are attached.      Normal Vitamin D.    If you have any questions please feel free to contact [phone] or myself via [facility].    Sincerely,  Geneva Bray, CNP

## 2019-04-18 ENCOUNTER — ANCILLARY PROCEDURE (OUTPATIENT)
Dept: MAMMOGRAPHY | Facility: CLINIC | Age: 44
End: 2019-04-18
Attending: NURSE PRACTITIONER
Payer: COMMERCIAL

## 2019-04-18 DIAGNOSIS — Z12.31 ENCOUNTER FOR SCREENING MAMMOGRAM FOR BREAST CANCER: ICD-10-CM

## 2019-04-18 PROCEDURE — 77067 SCR MAMMO BI INCL CAD: CPT | Mod: TC

## 2019-04-18 PROCEDURE — 77063 BREAST TOMOSYNTHESIS BI: CPT | Mod: TC

## 2019-05-01 ENCOUNTER — OFFICE VISIT (OUTPATIENT)
Dept: FAMILY MEDICINE | Facility: CLINIC | Age: 44
End: 2019-05-01
Payer: COMMERCIAL

## 2019-05-01 VITALS
SYSTOLIC BLOOD PRESSURE: 128 MMHG | BODY MASS INDEX: 38.52 KG/M2 | DIASTOLIC BLOOD PRESSURE: 74 MMHG | HEART RATE: 90 BPM | HEIGHT: 65 IN | RESPIRATION RATE: 20 BRPM | OXYGEN SATURATION: 99 % | WEIGHT: 231.2 LBS | TEMPERATURE: 96.6 F

## 2019-05-01 DIAGNOSIS — Z98.84 S/P GASTRIC BYPASS: Primary | ICD-10-CM

## 2019-05-01 PROCEDURE — 99213 OFFICE O/P EST LOW 20 MIN: CPT | Performed by: NURSE PRACTITIONER

## 2019-05-01 RX ORDER — PEDI MULTIVIT NO.25/FOLIC ACID 300 MCG
2 TABLET,CHEWABLE ORAL DAILY
COMMUNITY
Start: 2019-05-01

## 2019-05-01 RX ORDER — CYANOCOBALAMIN (VITAMIN B-12) 2500 MCG
2500 TABLET, SUBLINGUAL SUBLINGUAL DAILY
COMMUNITY
Start: 2019-05-01

## 2019-05-01 ASSESSMENT — PAIN SCALES - GENERAL: PAINLEVEL: MILD PAIN (2)

## 2019-05-01 ASSESSMENT — MIFFLIN-ST. JEOR: SCORE: 1694.6

## 2019-05-01 NOTE — PROGRESS NOTES
SUBJECTIVE:   Megan Dowd is a 44 year old female who presents to clinic today for the following   health issues:          Hospital Follow-up Visit:    Hospital/Nursing Home/IP Rehab Facility: [facility]  Date of Admission: 4/22/19  Date of Discharge: 4/23/19  Reason(s) for Admission: Laparoscopic            Problems taking medications regularly:  None       Medication changes since discharge: not taking birth control       Problems adhering to non-medication therapy:  None        Summary of hospitalization:  CareEverywhere information obtained and reviewed  Diagnostic Tests/Treatments reviewed.  Follow up needed: none  Other Healthcare Providers Involved in Patient s Care:         Specialist appointment -  July  Update since discharge: improved.     Patient is doing well postoperatively.  She feels good.  She is eating a liquid diet as prescribed.  She notes that her bowels are moving normally.  She denies any drainage from her incisions or fever.  She is taking supplements as prescribed.    Post Discharge Medication Reconciliation: discharge medications reconciled and changed, per note/orders (see AVS).  Plan of care communicated with patient     Coding guidelines for this visit:  Type of Medical   Decision Making Face-to-Face Visit       within 7 Days of discharge Face-to-Face Visit        within 14 days of discharge   Moderate Complexity [id number]   High Complexity [id number] [id number]              Additional history: as documented    Reviewed  and updated as needed this visit by clinical staff         Reviewed and updated as needed this visit by Provider         Patient Active Problem List   Diagnosis     Insomnia     Anxiety     IBS (irritable bowel syndrome)     CARDIOVASCULAR SCREENING; LDL GOAL LESS THAN 160     Family history of diabetes mellitus     Polymenorrhea     Dysmenorrhea     Ovarian cyst     Morbid obesity (H)     Past Surgical History:   Procedure Laterality Date     BREAST SURGERY   3/2013    breast implants     RELEASE CARPAL TUNNEL Right 2018    Procedure: RELEASE CARPAL TUNNEL;  Right carpal tunnel release;  Surgeon: Moe Castro MD;  Location: [facility] OR     SURGICAL HISTORY OF -   3/13    bilateral breast augmentations with implants     TUBAL LIGATION         Social History     Tobacco Use     Smoking status: Former Smoker     Packs/day: 0.30     Years: 18.00     Pack years: 5.40     Types: Cigarettes     Last attempt to quit: 2017     Years since quittin.8     Smokeless tobacco: Never Used   Substance Use Topics     Alcohol use: Yes     Comment: SOCIALLY     Family History   Problem Relation Age of Onset     Hypertension Mother      Diabetes Father      Hypertension Father      Gynecology Maternal Grandmother         ENDOMETRIOSIS     Heart Disease Maternal Grandmother         MINI HEART ATTACKS     Unknown/Adopted Maternal Grandfather      Cancer Paternal Grandmother      Cancer Paternal Grandfather         CANCER OF SPINE     Gynecology Sister         endometriosis--had hysterectomy     Gynecology Sister         has had cysts--had partial hysterectomy         Current Outpatient Medications   Medication Sig Dispense Refill     ALPRAZolam (XANAX) 0.5 MG tablet Take 1 tablet (0.5 mg) by mouth as needed for anxiety 10 tablet 0     childrens multivitamin chewable tablet Take 2 tablets by mouth daily       FLUoxetine (PROZAC) 40 MG capsule Take 1 capsule (40 mg) by mouth every morning 90 capsule 3     omeprazole (PRILOSEC) 20 MG CR capsule Take 1 capsule (20 mg) by mouth daily 30 capsule 2     traZODone (DESYREL) 50 MG tablet Take 1 tablet (50 mg) by mouth nightly as needed for sleep 90 tablet 3     vitamin B-12 (CYANOCOBALAMIN) 2500 MCG sublingual tablet Take 1 tablet (2,500 mcg) by mouth daily       norgestrel-ethinyl estradiol (LO/OVRAL) 0.3-30 MG-MCG tablet Take 1 tablet by mouth daily (Patient not taking: Reported on 2019) 84 tablet 4     order for DME Equipment  "being ordered: bilateral wrist splints with thumb (Patient not taking: Reported on 5/1/2019) 2 each 0     Allergies   Allergen Reactions     Lexapro      Increased anxiety       Zoloft      NIGHT SWEATS AND RASH FROM SWEATING AND INCREASE IN WEIGHT     BP Readings from Last 3 Encounters:   05/01/19 128/74   04/08/19 110/68   07/11/18 116/84    Wt Readings from Last 3 Encounters:   05/01/19 104.9 kg (231 lb 3.2 oz)   04/08/19 115.6 kg (254 lb 12.8 oz)   07/11/18 109.9 kg (242 lb 3.2 oz)                  Labs reviewed in EPIC    ROS:  Constitutional, HEENT, cardiovascular, pulmonary, gi and gu systems are negative, except as otherwise noted.    OBJECTIVE:     /74   Pulse 90   Temp 96.6  F (35.9  C) (Oral)   Resp 20   Ht 1.651 m (5' 5\")   Wt 104.9 kg (231 lb 3.2 oz)   LMP 04/05/2019   SpO2 99%   BMI 38.47 kg/m    Body mass index is 38.47 kg/m .  GENERAL: healthy, alert and no distress  RESP: lungs clear to auscultation - no rales, rhonchi or wheezes  CV: regular rate and rhythm, normal S1 S2, no S3 or S4, no murmur, click or rub, no peripheral edema and peripheral pulses strong  MS: no gross musculoskeletal defects noted, no edema  SKIN: multiple abdominal incisions- no drainage, no surrounding erythema or warmth noted      Diagnostic Test Results:  none     ASSESSMENT/PLAN:     1. S/P gastric bypass  Patient doing well postoperatively.  - childrens multivitamin chewable tablet; Take 2 tablets by mouth daily  - vitamin B-12 (CYANOCOBALAMIN) 2500 MCG sublingual tablet; Take 1 tablet (2,500 mcg) by mouth daily    FUTURE APPOINTMENTS:       - Follow-up for annual visit or as needed    ANDREW Waterman [facility]      "

## 2019-07-09 ENCOUNTER — MYC REFILL (OUTPATIENT)
Dept: FAMILY MEDICINE | Facility: CLINIC | Age: 44
End: 2019-07-09

## 2019-07-09 DIAGNOSIS — F41.9 ANXIETY: ICD-10-CM

## 2019-07-09 DIAGNOSIS — R10.11 RUQ ABDOMINAL PAIN: ICD-10-CM

## 2019-07-09 RX ORDER — FLUOXETINE 40 MG/1
40 CAPSULE ORAL EVERY MORNING
Qty: 90 CAPSULE | Refills: 3 | Status: CANCELLED | OUTPATIENT
Start: 2019-07-09

## 2019-07-10 NOTE — TELEPHONE ENCOUNTER
"Routing refill request to provider for review/approval because:  A break in medication    Requested Prescriptions   Pending Prescriptions Disp Refills     omeprazole (PRILOSEC) 20 MG DR capsule  Last Written Prescription Date:  12/22/16  Last Fill Quantity: 30,  # refills: 2   Last office visit: 5/1/2019 with prescribing provider:     Future Office Visit:   30 capsule 2     Sig: Take 1 capsule (20 mg) by mouth daily       PPI Protocol Passed - 7/10/2019  4:58 PM        Passed - Not on Clopidogrel (unless Pantoprazole ordered)        Passed - No diagnosis of osteoporosis on record        Passed - Recent (12 mo) or future (30 days) visit within the authorizing provider's specialty     Patient had office visit in the last 12 months or has a visit in the next 30 days with authorizing provider or within the authorizing provider's specialty.  See \"Patient Info\" tab in inbasket, or \"Choose Columns\" in Meds & Orders section of the refill encounter.              Passed - Medication is active on med list        Passed - Patient is age 18 or older        Passed - No active pregnacy on record        Passed - No positive pregnancy test in past 12 months        Cathy Camarena RN - BC      "

## 2019-07-12 RX ORDER — FLUOXETINE 40 MG/1
40 CAPSULE ORAL EVERY MORNING
Qty: 90 CAPSULE | Refills: 2 | Status: SHIPPED | OUTPATIENT
Start: 2019-07-12 | End: 2020-03-16

## 2020-02-24 ENCOUNTER — HEALTH MAINTENANCE LETTER (OUTPATIENT)
Age: 45
End: 2020-02-24

## 2020-03-16 ENCOUNTER — MYC REFILL (OUTPATIENT)
Dept: FAMILY MEDICINE | Facility: CLINIC | Age: 45
End: 2020-03-16

## 2020-03-16 DIAGNOSIS — N92.0 POLYMENORRHEA: ICD-10-CM

## 2020-03-16 DIAGNOSIS — F41.9 ANXIETY: ICD-10-CM

## 2020-03-18 RX ORDER — ALPRAZOLAM 0.5 MG
0.5 TABLET ORAL PRN
Qty: 10 TABLET | Refills: 0 | Status: SHIPPED | OUTPATIENT
Start: 2020-03-18 | End: 2020-07-22

## 2020-03-18 RX ORDER — FLUOXETINE 40 MG/1
40 CAPSULE ORAL EVERY MORNING
Qty: 90 CAPSULE | Refills: 0 | Status: SHIPPED | OUTPATIENT
Start: 2020-03-18 | End: 2020-06-09

## 2020-03-18 NOTE — TELEPHONE ENCOUNTER
"Routing refill request to provider for review/approval because:  Drug not on the [facility] [place]    checked - last dispensed on 4/30/2019    Requested Prescriptions   Pending Prescriptions Disp Refills     ALPRAZolam (XANAX) 0.5 MG tablet 10 tablet 0     Sig: Take 1 tablet (0.5 mg) by mouth as needed for anxiety       There is no refill protocol information for this order      Signed Prescriptions Disp Refills    norgestrel-ethinyl estradiol (LO/OVRAL) 0.3-30 MG-MCG tablet 84 tablet 1     Sig: Take 1 tablet by mouth daily       Contraceptives Protocol Passed - 3/17/2020  7:23 AM        Passed - Patient is not a current smoker if age is 35 or older        Passed - Recent (12 mo) or future (30 days) visit within the authorizing provider's specialty     Patient has had an office visit with the authorizing provider or a provider within the authorizing providers department within the previous 12 mos or has a future within next 30 days. See \"Patient Info\" tab in inbasket, or \"Choose Columns\" in Meds & Orders section of the refill encounter.              Passed - Medication is active on med list        Passed - No active pregnancy on record        Passed - No positive pregnancy test in past 12 months           Lavonne Paez RN  "

## 2020-06-09 DIAGNOSIS — F41.9 ANXIETY: ICD-10-CM

## 2020-06-09 RX ORDER — FLUOXETINE 40 MG/1
40 CAPSULE ORAL EVERY MORNING
Qty: 30 CAPSULE | Refills: 0 | Status: SHIPPED | OUTPATIENT
Start: 2020-06-09 | End: 2020-07-05

## 2020-06-09 NOTE — TELEPHONE ENCOUNTER
Please notify patient that she is due for appointment for further medication refills (can be video or telephone).     Medication is being filled for 1 time refill only due to:  Patient needs to be seen because it has been more than one year since last visit.

## 2020-07-02 DIAGNOSIS — F41.9 ANXIETY: ICD-10-CM

## 2020-07-05 RX ORDER — FLUOXETINE 40 MG/1
CAPSULE ORAL
Qty: 30 CAPSULE | Refills: 0 | Status: SHIPPED | OUTPATIENT
Start: 2020-07-05 | End: 2020-07-20

## 2020-07-05 NOTE — TELEPHONE ENCOUNTER
Medication is being filled for 1 time refill only due to:  Patient needs to be seen because needs appt-pt has upcoming appt..   Nelida Hoffman RN

## 2020-07-21 ENCOUNTER — MYC MEDICAL ADVICE (OUTPATIENT)
Dept: FAMILY MEDICINE | Facility: CLINIC | Age: 45
End: 2020-07-21

## 2020-07-21 ENCOUNTER — VIRTUAL VISIT (OUTPATIENT)
Dept: FAMILY MEDICINE | Facility: CLINIC | Age: 45
End: 2020-07-21
Payer: COMMERCIAL

## 2020-07-21 DIAGNOSIS — T63.441A LOCAL REACTION TO BEE STING, ACCIDENTAL OR UNINTENTIONAL, INITIAL ENCOUNTER: Primary | ICD-10-CM
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
## 2020-07-21 PROCEDURE — 99213 OFFICE O/P EST LOW 20 MIN: CPT | Mod: 95 | Performed by: NURSE PRACTITIONER

## 2020-07-21 RX ORDER — CEPHALEXIN 500 MG/1
500 CAPSULE ORAL 4 TIMES DAILY
Qty: 28 CAPSULE | Refills: 0 | Status: SHIPPED | OUTPATIENT
Start: 2020-07-21 | End: 2020-07-28

## 2020-07-21 RX ORDER — PREDNISONE 20 MG/1
20 TABLET ORAL DAILY
Qty: 5 TABLET | Refills: 0 | Status: SHIPPED | OUTPATIENT
Start: 2020-07-21 | End: 2020-07-26

## 2020-07-21 ASSESSMENT — ENCOUNTER SYMPTOMS
SHORTNESS OF BREATH: 0
PARESTHESIAS: 0
BREAST MASS: 0
NAUSEA: 0
MYALGIAS: 0
FEVER: 0
DIARRHEA: 0
DIZZINESS: 0
COUGH: 0
PALPITATIONS: 0
ARTHRALGIAS: 0
DYSURIA: 0
HEMATOCHEZIA: 0
CHILLS: 0
JOINT SWELLING: 0
HEADACHES: 0
NERVOUS/ANXIOUS: 0
EYE PAIN: 0
WEAKNESS: 0
SORE THROAT: 0
ABDOMINAL PAIN: 0
FREQUENCY: 0
CONSTIPATION: 0
HEMATURIA: 0
HEARTBURN: 0

## 2020-07-21 NOTE — TELEPHONE ENCOUNTER
Reason for call:  Other   Patient called regarding (reason for call): Patient is calling regarding my chart message   Additional comments: Patient would like someone to call her to discuss options.     Phone number to reach patient:  Home number on file [phone] (home)    Best Time:  any    Can we leave a detailed message on this number?  YES    Travel screening: Negative

## 2020-07-21 NOTE — PATIENT INSTRUCTIONS
Patient Education     Insect Sting Allergy, Generalized  You are having an allergic reaction to an insect sting. This may occur after a sting by a wasp, honeybee, yellow jacket, or other insect. This may cause an itchy rash and swelling in the face or other parts of the body. A more severe reaction may cause you to feel dizzy, faint, or have trouble breathing or swallowing. Other warning signs are listed below.  Symptoms can include:    Rash, hives, redness, welts, or blisters in areas other than the sting site    Itching, burning, stinging, pain in areas other than the sting site    Dry, flaky, cracking, scaly skin    Swelling in areas other than the sting site     Stomach pain or cramps  More severe symptoms include:    Swelling of the face or lips or drooling    Trouble swallowing, feeling like your throat is closing    Trouble breathing, wheezing    Dizziness or a sudden decrease in blood pressure    Hoarse voice or trouble speaking    Severe nausea, vomiting, or diarrhea    Feeling faint or lightheaded    Rapid heart rate  Home care  Medicine  The healthcare provider may prescribe medicines to relieve swelling, itching, and pain. Follow the provider s instructions when taking these medicines.    If you had a severe reaction, the provider may prescribe an injectable epinephrine kit. Epinephrine will stop the progression of an allergic reaction. Before you leave the hospital, be sure that you understand when and how to use this medicine.    Oral diphenhydramine is an over-the-counter antihistamine available at pharmacies and grocery stores. Unless a prescription antihistamine was given, diphenhydramine may be used to reduce itching if large areas of the skin are involved. It may make you sleepy, so be careful using it in the daytime or when going to school, working, or driving. Note: Don t use diphenhydramine if you have glaucoma or if you are a man with trouble urinating due to an enlarged prostate. There are  other antihistamines that cause less drowsiness and are good choices for daytime use. Ask your pharmacist for suggestions.    Don t use diphenhydramine cream on your skin. It can cause a further reaction in some people.    Calamine lotion or oatmeal baths sometimes help with itching.    You may use acetaminophen or ibuprofen to control pain, unless another pain medicine was prescribed. Note: If you have chronic liver or kidney disease or ever had a stomach ulcer or gastrointestinal bleeding, talk with your provider before using these medicines.    General care  Avoid tight clothing and things that heat up your skin (such as hot showers or baths, or direct sunlight). Heat makes the itching worse.  An ice pack will relieve local areas of intense itching and redness. Apply 5 to 10 minutes. To make an ice pack, put ice cubes in a plastic bag that seals at the top. Wrap the bag in a clean, thin towel or cloth. Don t put ice directly on the skin.  Ticks  If you try to remove a tick, do the following:    Use a set of fine tweezers and  the tick as close to the skin as is possible.    Pull upwards, using even, steady pressure. Don t jerk or twist the tick. The tick s bodily fluids may contain infection-causing organisms. So don t squeeze, crush, or puncture the body of the tick. Don t use a smoldering match or cigarette, nail polish, petroleum jelly, liquid soap, or kerosene. They may irritate the tick.    If any mouthparts of the tick remain in the skin, these can be removed with tweezers. If you can t remove the mouth (of a tick) easily with clean tweezers, leave it alone and let the skin heal.    After the tick is removed, wash the bite area with rubbing alcohol, iodine, or soap and water.    Put the tick in a sealed container and completely cover it with alcohol. Never try to kill or crush a tick with your hand or fingers.  Stings  Wasps, yellow jackets, and hornets don t leave a stinger behind. But if a honeybee  stings you, a stinger may stay in your skin. The stinger of a honeybee releases a substance that will attract other bees to you. So try to move away from the nest immediately. Once you are away from the nest, then remove the stinger as quickly as possible by:    Scraping the stinger out with the edge of a dull knife or plastic card (credit card).    Don't use a tweezer or your fingers to remove the stinger since that may squeeze more toxin from the stinger.    Wash the affected area with soap and warm water 2 to 3 times a day. Don't break a blister, if present.    Next apply an ice pack for 5 to 10 minutes. To make an ice pack, put ice cubes in a plastic bag that seals at the top. Wrap the bag in a clean, thin towel or cloth. Don t put ice directly on the skin.    Contact your healthcare provider and ask what can be used to help decrease the swelling and itching to the affected area.     To prevent an infection, don't scratch the affected areas. Always check the sting area for signs of an infection: increased redness, swelling, or pain to the affected area.  Preventing future reactions  Future reactions could be worse than this one. So try to avoid situations where you might be stung:    Don't walk in grass without shoes. Avoid wearing sandals.    Don't leave food uncovered when eating outside. Sweet treats, watermelon, and ice cream attract insects.    Don't drink from uncovered sweetened drinks in cans when outside. Insects are attracted to soda drink cans and sometimes crawl inside of them.    Don't wear bright colored clothes with flowery prints and patterns when outside.    Don t wear perfume when outside. Smell attracts insects.    Wear long pants, long-sleeved shirts, socks, and work gloves when working outside.    Be aware that honeybees nest in trees. Wasps and yellow jackets nest in the ground, trees or roof eaves. Avoid garbage cans when outside.  Auto-injectable epinephrine    If you are at high risk for  another sting due to where you work or play, or if your reaction included dizziness, fainting or trouble breathing or swallowing, an auto-injectable epinephrine may be prescribed. If not, ask your healthcare provider for one and always carry it with you. Learn how to use the device. If you begin to feel the symptoms of another reaction in the future, use the auto-injectable epinephrine to inject yourself, and then call 911. Don't wait until symptoms become severe.     Remember that the auto-injectable epinephrine is a rescue medicine only. You still need someone to take you to the hospital or call 911 after you have received the medicine.  Follow-up care  Follow up with your healthcare provider, or as advised if your symptoms do not continue to improve.  Call 911  Call 911 if any of these occur:    Trouble breathing or swallowing, wheezing     Cool, moist, pale skin    Hoarse voice or trouble speaking    Confused    Very drowsy or trouble waking up    Fainting or loss of consciousness    Rapid heart rate    Low blood pressure or feeling dizzy or weak    Feeling of doom    Severe nausea, vomiting, or diarrhea    Seizure    Swelling in the face, eyelids, lips, mouth, throat or tongue    Drooling  When to seek medical advice  Call your healthcare provider right away if any of the following occur:    Spreading areas of itching, redness or swelling    Headache, fever, chills, muscle or joint aching    Increased pain or swelling    Signs of infection of the affected area:  ? Spreading redness  ? Increase in pain or swelling  ? Fluid or colored drainage from the affected site  Date Last Reviewed: 3/1/2017    5571-5665 [employer]. [address] [place] [address], [place] [address]. All rights reserved. This information is not intended as a substitute for professional medical care. Always follow your healthcare professional's instructions.

## 2020-07-21 NOTE — PROGRESS NOTES
"Megan Dowd is a 45 year old female who is being evaluated via a billable video visit.      The patient has been notified of following:     \"This video visit will be conducted via a call between you and your physician/provider. We have found that certain health care needs can be provided without the need for an in-person physical exam.  This service lets us provide the care you need with a video conversation.  If a prescription is necessary we can send it directly to your pharmacy.  If lab work is needed we can place an order for that and you can then stop by our lab to have the test done at a later time.    Video visits are billed at different rates depending on your insurance coverage.  Please reach out to your insurance provider with any questions.    If during the course of the call the physician/provider feels a video visit is not appropriate, you will not be charged for this service.\"    Patient has given verbal consent for Video visit? Yes  How would you like to obtain your AVS? MyChart  If you are dropped from the video visit, the video invite should be resent to: Other e-mail: [email or web address]  Will anyone else be joining your video visit? No  Subjective     Megan Dowd is a 45 year old female who presents today via video visit for the following health issues:    HPI    Bee sting       Duration: Sunday     Description (location/character/radiation): right side of face    Intensity:  Moderate, gradually worsening    Accompanying signs and symptoms: swollen, red, watery eye, runny nose clear, face is warm to touch, painful and itchy       Denies vision changes, red streaks running away from area, fever, chills, nausea, vomiting, headache, pain of the eye    History (similar episodes/previous evaluation): Has had local reaction to bee sting before but it usually goes away in a day    Precipitating or alleviating factors: has two bee stings (right eyelid and cheek) occurred 3 days ago while sitting on her " deck    Therapies tried and outcome: ice not helping     Video Start Time: 1209    Patient Active Problem List   Diagnosis     Insomnia     Anxiety     IBS (irritable bowel syndrome)     CARDIOVASCULAR SCREENING; LDL GOAL LESS THAN 160     Family history of diabetes mellitus     Polymenorrhea     Dysmenorrhea     Ovarian cyst     Morbid obesity (H)     Past Surgical History:   Procedure Laterality Date     BREAST SURGERY  3/2013    breast implants     RELEASE CARPAL TUNNEL Right 6/21/2018    Procedure: RELEASE CARPAL TUNNEL;  Right carpal tunnel release;  Surgeon: Moe Castro MD;  Location: [facility] OR     SURGICAL HISTORY OF -   3/13    bilateral breast augmentations with implants     TUBAL LIGATION  4/07       Social History     Tobacco Use     Smoking status: Former Smoker     Packs/day: 0.30     Years: 18.00     Pack years: 5.40     Types: Cigarettes     Last attempt to quit: 7/1/2017     Years since quitting: 3.0     Smokeless tobacco: Never Used   Substance Use Topics     Alcohol use: Yes     Comment: SOCIALLY     Family History   Problem Relation Age of Onset     Hypertension Mother      Diabetes Father      Hypertension Father      Gynecology Maternal Grandmother         ENDOMETRIOSIS     Heart Disease Maternal Grandmother         MINI HEART ATTACKS     Unknown/Adopted Maternal Grandfather      Cancer Paternal Grandmother      Cancer Paternal Grandfather         CANCER OF SPINE     Gynecology Sister         endometriosis--had hysterectomy     Gynecology Sister         has had cysts--had partial hysterectomy         Current Outpatient Medications   Medication Sig Dispense Refill     ALPRAZolam (XANAX) 0.5 MG tablet Take 1 tablet (0.5 mg) by mouth as needed for anxiety 10 tablet 0     cephALEXin (KEFLEX) 500 MG capsule Take 1 capsule (500 mg) by mouth 4 times daily for 7 days 28 capsule 0     childrens multivitamin chewable tablet Take 2 tablets by mouth daily       FLUoxetine (PROZAC) 40 MG capsule TAKE 1  CAPSULE (40 MG) BY MOUTH EVERY MORNING. NEEDS TO BE SEEN 30 capsule 0     norgestrel-ethinyl estradiol (LO/OVRAL) 0.3-30 MG-MCG tablet Take 1 tablet by mouth daily 84 tablet 1     order for DME Equipment being ordered: bilateral wrist splints with thumb 2 each 0     predniSONE (DELTASONE) 20 MG tablet Take 1 tablet (20 mg) by mouth daily for 5 days 5 tablet 0     vitamin B-12 (CYANOCOBALAMIN) 2500 MCG sublingual tablet Take 1 tablet (2,500 mcg) by mouth daily       Allergies   Allergen Reactions     Lexapro      Increased anxiety       Zoloft      NIGHT SWEATS AND RASH FROM SWEATING AND INCREASE IN WEIGHT       Reviewed and updated as needed this visit by Provider         Review of Systems   Constitutional, HEENT, cardiovascular, pulmonary, gi and gu systems are negative, except as otherwise noted.      Objective      Physical Exam     GENERAL: Healthy, alert and no distress  EYES: No obvious scleral/conjunctival abnormalities. Erythema and edema of right  upper and lower eyelids.  RESP: No audible wheeze, cough, or visible cyanosis.  No visible retractions or increased work of breathing.    SKIN: Erythema and edema from right eyebrow to midcheek. No streaking noted  NEURO: Cranial nerves grossly intact.  Mentation and speech appropriate for age.  PSYCH: Mentation appears normal, affect normal/bright, judgement and insight intact, normal speech and appearance well-groomed.    Diagnostic Test Results:  Labs reviewed in Epic  none         Assessment & Plan     1. Local reaction to bee sting, accidental or unintentional, initial encounter  - predniSONE (DELTASONE) 20 MG tablet; Take 1 tablet (20 mg) by mouth daily for 5 days  Dispense: 5 tablet; Refill: 0    2. Bite or sting by insect with infection  Counseled on red flags such as headache, fever, chills, vision changes, increase in pain or swelling, colored drainage.  - cephALEXin (KEFLEX) 500 MG capsule; Take 1 capsule (500 mg) by mouth 4 times daily for 7 days   Dispense: 28 capsule; Refill: 0    Return in about 2 days (around 7/23/2020), or if symptoms worsen or fail to improve.    ANDREW Bai CNP  [facility]    Video-Visit Details    Type of service:  Video Visit    Video End Time: 1218    Originating Location (pt. Location): Home    Distant Location (provider location):  [facility]     Platform used for Video Visit: Doximity    Return in about 2 days (around 7/23/2020), or if symptoms worsen or fail to improve.       ANDREW Bai CNP

## 2020-07-21 NOTE — PROGRESS NOTES
SUBJECTIVE:   CC: Megan Dowd is an 45 year old woman who presents for preventive health visit.     Healthy Habits:     Getting at least 3 servings of Calcium per day:  Yes    Bi-annual eye exam:  NO    Dental care twice a year:  Yes    Sleep apnea or symptoms of sleep apnea:  None    Diet:  Regular (no restrictions)    Frequency of exercise:  4-5 days/week    Duration of exercise:  30-45 minutes    Taking medications regularly:  Yes    Medication side effects:  None    PHQ-2 Total Score: 0    Additional concerns today:  No    Patient notes that bee sting to right eye is improving.  She notes some continued swelling and pain.  She is able to move her eye without difficulty.  She does not worsening bee stings reactions every time she is stung.  She wonders if she could have an epi-pen on hand in case of anaphylaxis.      Anxiety Follow-Up    How are you doing with your anxiety since your last visit? No change    Are you having other symptoms that might be associated with anxiety? No    Have you had a significant life event? No     Are you feeling depressed? No    Do you have any concerns with your use of alcohol or other drugs? No    Social History     Tobacco Use     Smoking status: Former Smoker     Packs/day: 0.30     Years: 18.00     Pack years: 5.40     Types: Cigarettes     Last attempt to quit: 7/1/2017     Years since quitting: 3.0     Smokeless tobacco: Never Used   Substance Use Topics     Alcohol use: Yes     Comment: SOCIALLY     Drug use: No     RAND-7 SCORE 2/8/2016 12/22/2016 5/30/2017   Total Score - - -   Total Score 5 3 6     PHQ 2/8/2016 12/22/2016 5/30/2017   PHQ-9 Total Score 3 6 7   Q9: Thoughts of better off dead/self-harm past 2 weeks Not at all Not at all Not at all         Today's PHQ-2 Score:   PHQ-2 ( 1999 Pfizer) 7/21/2020   Q1: Little interest or pleasure in doing things 0   Q2: Feeling down, depressed or hopeless 0   PHQ-2 Score 0   Q1: Little interest or pleasure in doing  things Not at all   Q2: Feeling down, depressed or hopeless Not at all   PHQ-2 Score 0       Abuse: Current or Past(Physical, Sexual or Emotional)- No  Do you feel safe in your environment? Yes        Social History     Tobacco Use     Smoking status: Former Smoker     Packs/day: 0.30     Years: 18.00     Pack years: 5.40     Types: Cigarettes     Last attempt to quit: 7/1/2017     Years since quitting: 3.0     Smokeless tobacco: Never Used   Substance Use Topics     Alcohol use: Yes     Comment: SOCIALLY         Alcohol Use 7/21/2020   Prescreen: >3 drinks/day or >7 drinks/week? Not Applicable   Prescreen: >3 drinks/day or >7 drinks/week? -       Reviewed orders with patient.  Reviewed health maintenance and updated orders accordingly - Yes  BP Readings from Last 3 Encounters:   07/22/20 108/70   05/01/19 128/74   04/08/19 110/68    Wt Readings from Last 3 Encounters:   07/22/20 70.7 kg (155 lb 12.8 oz)   05/01/19 104.9 kg (231 lb 3.2 oz)   04/08/19 115.6 kg (254 lb 12.8 oz)                  Patient Active Problem List   Diagnosis     Insomnia     Anxiety     IBS (irritable bowel syndrome)     CARDIOVASCULAR SCREENING; LDL GOAL LESS THAN 160     Family history of diabetes mellitus     Polymenorrhea     Dysmenorrhea     Ovarian cyst     Morbid obesity (H)     Past Surgical History:   Procedure Laterality Date     BREAST SURGERY  3/2013    breast implants     RELEASE CARPAL TUNNEL Right 6/21/2018    Procedure: RELEASE CARPAL TUNNEL;  Right carpal tunnel release;  Surgeon: Moe Castro MD;  Location: [facility] OR     SURGICAL HISTORY OF -   3/13    bilateral breast augmentations with implants     TUBAL LIGATION  4/07       Social History     Tobacco Use     Smoking status: Former Smoker     Packs/day: 0.30     Years: 18.00     Pack years: 5.40     Types: Cigarettes     Last attempt to quit: 7/1/2017     Years since quitting: 3.0     Smokeless tobacco: Never Used   Substance Use Topics     Alcohol use: Yes     Comment:  SOCIALLY     Family History   Problem Relation Age of Onset     Hypertension Mother      Diabetes Father      Hypertension Father      Gynecology Maternal Grandmother         ENDOMETRIOSIS     Heart Disease Maternal Grandmother         MINI HEART ATTACKS     Unknown/Adopted Maternal Grandfather      Cancer Paternal Grandmother      Cancer Paternal Grandfather         CANCER OF SPINE     Gynecology Sister         endometriosis--had hysterectomy     Gynecology Sister         has had cysts--had partial hysterectomy         Current Outpatient Medications   Medication Sig Dispense Refill     ALPRAZolam (XANAX) 0.5 MG tablet Take 1 tablet (0.5 mg) by mouth as needed for anxiety 10 tablet 0     cephALEXin (KEFLEX) 500 MG capsule Take 1 capsule (500 mg) by mouth 4 times daily for 7 days 28 capsule 0     childrens multivitamin chewable tablet Take 2 tablets by mouth daily       FLUoxetine (PROZAC) 40 MG capsule TAKE 1 CAPSULE (40 MG) BY MOUTH EVERY MORNING. NEEDS TO BE SEEN 30 capsule 0     norgestrel-ethinyl estradiol (LO/OVRAL) 0.3-30 MG-MCG tablet Take 1 tablet by mouth daily 84 tablet 1     predniSONE (DELTASONE) 20 MG tablet Take 1 tablet (20 mg) by mouth daily for 5 days 5 tablet 0     vitamin B-12 (CYANOCOBALAMIN) 2500 MCG sublingual tablet Take 1 tablet (2,500 mcg) by mouth daily       order for DME Equipment being ordered: bilateral wrist splints with thumb 2 each 0     Allergies   Allergen Reactions     Lexapro      Increased anxiety       Nsaids Other (See Comments)     This patient has a history of a Ricardo-en-Y gastric bypass. AVOID NSAIDs and aspirin due to risk of gastric and/or G-J anastomotic ulcers. If Megan must be on short course of NSAIDs or aspirin, use enteric coated if possible and use PPI      Zoloft      NIGHT SWEATS AND RASH FROM SWEATING AND INCREASE IN WEIGHT       Mammogram Screening: Patient under age 50, mutual decision reflected in health maintenance.      Pertinent mammograms are reviewed  "under the imaging tab.  History of abnormal Pap smear: NO - age 30-65 PAP every 5 years with negative HPV co-testing recommended  PAP / HPV Latest Ref Rng & Units 5/30/2017 10/17/2014 6/15/2011   PAP - NIL NIL NIL   HPV 16 DNA NEG Negative - -   HPV 18 DNA NEG Negative - -   OTHER HR HPV NEG Negative - -     Reviewed and updated as needed this visit by clinical staff  Tobacco  Allergies  Meds  Problems  Med Hx  Surg Hx  Fam Hx         Reviewed and updated as needed this visit by Provider  Tobacco  Allergies  Meds  Problems  Med Hx  Surg Hx  Fam Hx            Review of Systems   Constitutional: Negative for chills and fever.   HENT: Negative for congestion, ear pain, hearing loss and sore throat.    Eyes: Negative for pain and visual disturbance.   Respiratory: Negative for cough and shortness of breath.    Cardiovascular: Negative for chest pain, palpitations and peripheral edema.   Gastrointestinal: Negative for abdominal pain, constipation, diarrhea, heartburn, hematochezia and nausea.   Breasts:  Negative for tenderness, breast mass and discharge.   Genitourinary: Negative for dysuria, frequency, genital sores, hematuria, pelvic pain, urgency, vaginal bleeding and vaginal discharge.   Musculoskeletal: Negative for arthralgias, joint swelling and myalgias.   Skin: Negative for rash.   Neurological: Negative for dizziness, weakness, headaches and paresthesias.   Psychiatric/Behavioral: Negative for mood changes. The patient is not nervous/anxious.           OBJECTIVE:   /70   Pulse 61   Temp 98.1  F (36.7  C) (Oral)   Resp 16   Ht 1.679 m (5' 6.1\")   Wt 70.7 kg (155 lb 12.8 oz)   LMP 07/15/2020   SpO2 100%   BMI 25.07 kg/m    Physical Exam  GENERAL: healthy, alert and no distress  EYES: PERRL, EOMI and erythema and edema noted to right orbit  HENT: ear canals and TM's normal, nose and mouth without ulcers or lesions  NECK: no adenopathy, no asymmetry, masses, or scars and thyroid normal " to palpation  RESP: lungs clear to auscultation - no rales, rhonchi or wheezes  CV: regular rate and rhythm, normal S1 S2, no S3 or S4, no murmur, click or rub, no peripheral edema and peripheral pulses strong  ABDOMEN: soft, nontender, no hepatosplenomegaly, no masses and bowel sounds normal  MS: no gross musculoskeletal defects noted, no edema  NEURO: Normal strength and tone, mentation intact and speech normal  PSYCH: mentation appears normal, affect normal/bright    Diagnostic Test Results:  none     ASSESSMENT/PLAN:   1. Routine general medical examination at a health care facility    - GLUCOSE; Future  - Lipid panel reflex to direct LDL Fasting; Future    2. Anxiety  Stable.  Continue current treatment plan and medications.   - ALPRAZolam (XANAX) 0.5 MG tablet; Take 1 tablet (0.5 mg) by mouth as needed for anxiety  Dispense: 10 tablet; Refill: 0  - FLUoxetine (PROZAC) 40 MG capsule; TAKE 1 CAPSULE (40 MG) BY MOUTH EVERY MORNING.  Dispense: 90 capsule; Refill: 3    3. Screening for HIV (human immunodeficiency virus)    - HIV Screening; Future    4. Other specified intestinal malabsorption  Due for gastric bypass labs.  - Vitamin B12; Future  - Vitamin D Deficiency; Future  - Folate; Future  - CBC with platelets differential; Future  - Comprehensive metabolic panel; Future  - Ferritin; Future  - Iron and iron binding capacity; Future  - Parathyroid Hormone Intact; Future  - Vitamin B1 whole blood; Future    5. Bee sting allergy    - EPINEPHrine (ANY BX GENERIC EQUIV) 0.3 MG/0.3ML injection 2-pack; Inject 0.3 mLs (0.3 mg) into the muscle as needed for anaphylaxis  Dispense: 1 each; Refill: 3    6. Polymenorrhea    - norgestrel-ethinyl estradiol (LO/OVRAL) 0.3-30 MG-MCG tablet; Take 1 tablet by mouth daily  Dispense: 84 tablet; Refill: 3    7. Bite or sting by insect with infection  Improving.  Finish cephalexin.    8. S/P gastric bypass  Patient has lost 110 lbs post surgery and is doing  "well.      COUNSELING:  Reviewed preventive health counseling, as reflected in patient instructions       Regular exercise       Healthy diet/nutrition       Immunizations    Vaccinated for: Td          Estimated body mass index is 25.07 kg/m  as calculated from the following:    Height as of this encounter: 1.679 m (5' 6.1\").    Weight as of this encounter: 70.7 kg (155 lb 12.8 oz).         reports that she quit smoking about 3 years ago. Her smoking use included cigarettes. She has a 5.40 pack-year smoking history. She has never used smokeless tobacco.      Counseling Resources:  ATP IV Guidelines  Pooled Cohorts Equation Calculator  Breast Cancer Risk Calculator  FRAX Risk Assessment  ICSI Preventive Guidelines  Dietary Guidelines for Americans, 2010  USDA's MyPlate  ASA Prophylaxis  Lung CA Screening    ANDREW Waterman CNP  [facility]  "

## 2020-07-22 ENCOUNTER — OFFICE VISIT (OUTPATIENT)
Dept: FAMILY MEDICINE | Facility: CLINIC | Age: 45
End: 2020-07-22
Payer: COMMERCIAL

## 2020-07-22 VITALS
RESPIRATION RATE: 16 BRPM | TEMPERATURE: 98.1 F | BODY MASS INDEX: 25.04 KG/M2 | HEIGHT: 66 IN | DIASTOLIC BLOOD PRESSURE: 70 MMHG | HEART RATE: 61 BPM | SYSTOLIC BLOOD PRESSURE: 108 MMHG | WEIGHT: 155.8 LBS | OXYGEN SATURATION: 100 %

## 2020-07-22 DIAGNOSIS — N92.0 POLYMENORRHEA: ICD-10-CM

## 2020-07-22 DIAGNOSIS — Z11.4 SCREENING FOR HIV (HUMAN IMMUNODEFICIENCY VIRUS): ICD-10-CM

## 2020-07-22 DIAGNOSIS — Z91.030 BEE STING ALLERGY: ICD-10-CM

## 2020-07-22 DIAGNOSIS — Z98.84 S/P GASTRIC BYPASS: ICD-10-CM

## 2020-07-22 DIAGNOSIS — F41.9 ANXIETY: ICD-10-CM

## 2020-07-22 DIAGNOSIS — K90.89 OTHER SPECIFIED INTESTINAL MALABSORPTION: ICD-10-CM

## 2020-07-22 DIAGNOSIS — Z00.00 ROUTINE GENERAL MEDICAL EXAMINATION AT A HEALTH CARE FACILITY: Primary | ICD-10-CM
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
## 2020-07-22 PROCEDURE — 99396 PREV VISIT EST AGE 40-64: CPT | Mod: 25 | Performed by: NURSE PRACTITIONER

## 2020-07-22 PROCEDURE — 90471 IMMUNIZATION ADMIN: CPT | Performed by: NURSE PRACTITIONER

## 2020-07-22 PROCEDURE — 90714 TD VACC NO PRESV 7 YRS+ IM: CPT | Performed by: NURSE PRACTITIONER

## 2020-07-22 RX ORDER — TRAZODONE HYDROCHLORIDE 50 MG/1
50 TABLET, FILM COATED ORAL
Qty: 90 TABLET | Refills: 3 | Status: CANCELLED | OUTPATIENT
Start: 2020-07-22

## 2020-07-22 RX ORDER — FLUOXETINE 40 MG/1
CAPSULE ORAL
Qty: 90 CAPSULE | Refills: 3 | Status: SHIPPED | OUTPATIENT
Start: 2020-07-22 | End: 2021-08-28

## 2020-07-22 RX ORDER — EPINEPHRINE 0.3 MG/.3ML
0.3 INJECTION SUBCUTANEOUS PRN
Qty: 1 EACH | Refills: 3 | Status: SHIPPED | OUTPATIENT
Start: 2020-07-22 | End: 2021-09-02

## 2020-07-22 RX ORDER — ALPRAZOLAM 0.5 MG
0.5 TABLET ORAL PRN
Qty: 10 TABLET | Refills: 0 | Status: SHIPPED | OUTPATIENT
Start: 2020-07-22 | End: 2021-08-27

## 2020-07-22 ASSESSMENT — PAIN SCALES - GENERAL: PAINLEVEL: NO PAIN (0)

## 2020-07-22 ASSESSMENT — MIFFLIN-ST. JEOR: SCORE: 1365.07

## 2020-08-10 ENCOUNTER — ANCILLARY PROCEDURE (OUTPATIENT)
Dept: MAMMOGRAPHY | Facility: CLINIC | Age: 45
End: 2020-08-10
Attending: NURSE PRACTITIONER
Payer: COMMERCIAL

## 2020-08-10 DIAGNOSIS — K90.89 OTHER SPECIFIED INTESTINAL MALABSORPTION: ICD-10-CM

## 2020-08-10 DIAGNOSIS — Z12.31 VISIT FOR SCREENING MAMMOGRAM: ICD-10-CM

## 2020-08-10 DIAGNOSIS — Z11.4 SCREENING FOR HIV (HUMAN IMMUNODEFICIENCY VIRUS): ICD-10-CM

## 2020-08-10 DIAGNOSIS — Z00.00 ROUTINE GENERAL MEDICAL EXAMINATION AT A HEALTH CARE FACILITY: ICD-10-CM

## 2020-08-10 LAB
ALBUMIN SERPL-MCNC: 3.3 G/DL (ref 3.4–5)
ALP SERPL-CCNC: 50 U/L (ref 40–150)
ALT SERPL W P-5'-P-CCNC: 28 U/L (ref 0–50)
ANION GAP SERPL CALCULATED.3IONS-SCNC: 2 MMOL/L (ref 3–14)
AST SERPL W P-5'-P-CCNC: 15 U/L (ref 0–45)
BASOPHILS # BLD AUTO: 0 10E9/L (ref 0–0.2)
BASOPHILS NFR BLD AUTO: 0.2 %
BILIRUB SERPL-MCNC: 0.4 MG/DL (ref 0.2–1.3)
BUN SERPL-MCNC: 8 MG/DL (ref 7–30)
CALCIUM SERPL-MCNC: 8.5 MG/DL (ref 8.5–10.1)
CHLORIDE SERPL-SCNC: 110 MMOL/L (ref 94–109)
CHOLEST SERPL-MCNC: 147 MG/DL
CO2 SERPL-SCNC: 29 MMOL/L (ref 20–32)
CREAT SERPL-MCNC: 0.83 MG/DL (ref 0.52–1.04)
DIFFERENTIAL METHOD BLD: ABNORMAL
EOSINOPHIL # BLD AUTO: 0.1 10E9/L (ref 0–0.7)
EOSINOPHIL NFR BLD AUTO: 0.8 %
ERYTHROCYTE [DISTWIDTH] IN BLOOD BY AUTOMATED COUNT: 12.6 % (ref 10–15)
FERRITIN SERPL-MCNC: 246 NG/ML (ref 8–252)
FOLATE SERPL-MCNC: 39.4 NG/ML
GFR SERPL CREATININE-BSD FRML MDRD: 84 ML/MIN/{1.73_M2}
GLUCOSE SERPL-MCNC: 84 MG/DL (ref 70–99)
HCT VFR BLD AUTO: 37.1 % (ref 35–47)
HDLC SERPL-MCNC: 56 MG/DL
HGB BLD-MCNC: 11.8 G/DL (ref 11.7–15.7)
IRON SATN MFR SERPL: 53 % (ref 15–46)
IRON SERPL-MCNC: 169 UG/DL (ref 35–180)
LDLC SERPL CALC-MCNC: 76 MG/DL
LYMPHOCYTES # BLD AUTO: 2 10E9/L (ref 0.8–5.3)
LYMPHOCYTES NFR BLD AUTO: 30.1 %
MCH RBC QN AUTO: 31.2 PG (ref 26.5–33)
MCHC RBC AUTO-ENTMCNC: 31.8 G/DL (ref 31.5–36.5)
MCV RBC AUTO: 98 FL (ref 78–100)
MONOCYTES # BLD AUTO: 0.5 10E9/L (ref 0–1.3)
MONOCYTES NFR BLD AUTO: 7.1 %
NEUTROPHILS # BLD AUTO: 4 10E9/L (ref 1.6–8.3)
NEUTROPHILS NFR BLD AUTO: 61.8 %
NONHDLC SERPL-MCNC: 91 MG/DL
PLATELET # BLD AUTO: 244 10E9/L (ref 150–450)
POTASSIUM SERPL-SCNC: 3.9 MMOL/L (ref 3.4–5.3)
PROT SERPL-MCNC: 6.4 G/DL (ref 6.8–8.8)
PTH-INTACT SERPL-MCNC: 42 PG/ML (ref 18–80)
RBC # BLD AUTO: 3.78 10E12/L (ref 3.8–5.2)
SODIUM SERPL-SCNC: 141 MMOL/L (ref 133–144)
TIBC SERPL-MCNC: 318 UG/DL (ref 240–430)
TRIGL SERPL-MCNC: 74 MG/DL
VIT B12 SERPL-MCNC: 817 PG/ML (ref 193–986)
WBC # BLD AUTO: 6.5 10E9/L (ref 4–11)

## 2020-08-10 PROCEDURE — 87389 HIV-1 AG W/HIV-1&-2 AB AG IA: CPT | Performed by: NURSE PRACTITIONER

## 2020-08-10 PROCEDURE — 80061 LIPID PANEL: CPT | Performed by: NURSE PRACTITIONER

## 2020-08-10 PROCEDURE — 36415 COLL VENOUS BLD VENIPUNCTURE: CPT | Performed by: NURSE PRACTITIONER

## 2020-08-10 PROCEDURE — 99000 SPECIMEN HANDLING OFFICE-LAB: CPT | Performed by: NURSE PRACTITIONER

## 2020-08-10 PROCEDURE — 83540 ASSAY OF IRON: CPT | Performed by: NURSE PRACTITIONER

## 2020-08-10 PROCEDURE — 77067 SCR MAMMO BI INCL CAD: CPT | Mod: TC

## 2020-08-10 PROCEDURE — 83550 IRON BINDING TEST: CPT | Performed by: NURSE PRACTITIONER

## 2020-08-10 PROCEDURE — 77063 BREAST TOMOSYNTHESIS BI: CPT | Mod: TC

## 2020-08-10 PROCEDURE — 82607 VITAMIN B-12: CPT | Performed by: NURSE PRACTITIONER

## 2020-08-10 PROCEDURE — 82306 VITAMIN D 25 HYDROXY: CPT | Performed by: NURSE PRACTITIONER

## 2020-08-10 PROCEDURE — 82728 ASSAY OF FERRITIN: CPT | Performed by: NURSE PRACTITIONER

## 2020-08-10 PROCEDURE — 80053 COMPREHEN METABOLIC PANEL: CPT | Performed by: NURSE PRACTITIONER

## 2020-08-10 PROCEDURE — 83970 ASSAY OF PARATHORMONE: CPT | Performed by: NURSE PRACTITIONER

## 2020-08-10 PROCEDURE — 85025 COMPLETE CBC W/AUTO DIFF WBC: CPT | Performed by: NURSE PRACTITIONER

## 2020-08-10 PROCEDURE — 84425 ASSAY OF VITAMIN B-1: CPT | Mod: 90 | Performed by: NURSE PRACTITIONER

## 2020-08-10 PROCEDURE — 82746 ASSAY OF FOLIC ACID SERUM: CPT | Performed by: NURSE PRACTITIONER

## 2020-08-10 NOTE — RESULT ENCOUNTER NOTE
Dear Megan,    Your recent test results are attached.      Normal B12.  Normal parathyroid hormone.    If you have any questions please feel free to contact [phone] or myself via [employer].    Sincerely,  Geneva Bray, CNP

## 2020-08-10 NOTE — RESULT ENCOUNTER NOTE
Dear Megan,    Your recent test results are attached.      No anemia.    If you have any questions please feel free to contact [phone] or myself via [employer].    Sincerely,  Geneva Bray, CNP

## 2020-08-10 NOTE — RESULT ENCOUNTER NOTE
Dear Megan,    Your recent test results are attached.      Normal folate.  Normal iron and iron stores.  Mild decrease in protein.  Please make sure you're getting adequate protein daily.  Good cholesterol.      If you have any questions please feel free to contact [phone] or myself via [employer].    Sincerely,  Geneva Bray, CNP

## 2020-08-11 LAB
DEPRECATED CALCIDIOL+CALCIFEROL SERPL-MC: 61 UG/L (ref 20–75)
HIV 1+2 AB+HIV1 P24 AG SERPL QL IA: NONREACTIVE

## 2020-08-11 NOTE — RESULT ENCOUNTER NOTE
Dear Megan,    Your recent test results are attached.      No HIV.  Normal Vitamin D.    If you have any questions please feel free to contact [phone] or myself via [employer].    Sincerely,  Geneva Bray, CNP

## 2020-08-13 LAB — VIT B1 BLD-MCNC: 136 NMOL/L (ref 70–180)

## 2020-08-13 NOTE — RESULT ENCOUNTER NOTE
Dear Megan,    Your recent test results are attached.      Normal thiamine.    If you have any questions please feel free to contact [phone] or myself via [employer].    Sincerely,  Geneva Bray, CNP

## 2020-08-25 ENCOUNTER — ANCILLARY PROCEDURE (OUTPATIENT)
Dept: MAMMOGRAPHY | Facility: CLINIC | Age: 45
End: 2020-08-25
Attending: NURSE PRACTITIONER
Payer: COMMERCIAL

## 2020-08-25 ENCOUNTER — ANCILLARY PROCEDURE (OUTPATIENT)
Dept: ULTRASOUND IMAGING | Facility: CLINIC | Age: 45
End: 2020-08-25
Attending: NURSE PRACTITIONER
Payer: COMMERCIAL

## 2020-08-25 DIAGNOSIS — R92.8 ABNORMAL MAMMOGRAM: ICD-10-CM

## 2020-08-25 PROCEDURE — G0279 TOMOSYNTHESIS, MAMMO: HCPCS

## 2020-08-25 PROCEDURE — 77065 DX MAMMO INCL CAD UNI: CPT

## 2020-08-25 PROCEDURE — 76642 ULTRASOUND BREAST LIMITED: CPT | Mod: RT

## 2020-08-30 NOTE — RESULT ENCOUNTER NOTE
Dear Megan,    Your recent test results are attached.      Normal mammogram.    If you have any questions please feel free to contact [phone] or myself via [employer].    Sincerely,  Geneva Bray, CNP

## 2020-12-13 ENCOUNTER — HEALTH MAINTENANCE LETTER (OUTPATIENT)
Age: 45
End: 2020-12-13

## 2021-08-20 DIAGNOSIS — N92.0 POLYMENORRHEA: ICD-10-CM

## 2021-08-22 RX ORDER — NORGESTREL AND ETHINYL ESTRADIOL 0.3-0.03MG
1 KIT ORAL DAILY
Qty: 84 TABLET | Refills: 0 | Status: SHIPPED | OUTPATIENT
Start: 2021-08-22 | End: 2021-09-02

## 2021-08-22 NOTE — TELEPHONE ENCOUNTER
Routing refill request to provider for review/approval because:  Patient needs to be seen because it has been more than 1 year since last office visit.    Please schedule annual exam.

## 2021-08-23 NOTE — TELEPHONE ENCOUNTER
Made appointment for physical next Thursday.       Armida REMY CMA ([facility])

## 2021-08-26 DIAGNOSIS — F41.9 ANXIETY: ICD-10-CM

## 2021-08-27 ENCOUNTER — MYC REFILL (OUTPATIENT)
Dept: FAMILY MEDICINE | Facility: CLINIC | Age: 46
End: 2021-08-27

## 2021-08-27 DIAGNOSIS — F41.9 ANXIETY: ICD-10-CM

## 2021-08-27 RX ORDER — ALPRAZOLAM 0.5 MG
0.5 TABLET ORAL 3 TIMES DAILY PRN
Qty: 10 TABLET | Refills: 0 | Status: SHIPPED | OUTPATIENT
Start: 2021-08-27 | End: 2022-08-04

## 2021-08-27 ASSESSMENT — ENCOUNTER SYMPTOMS
CONSTIPATION: 0
SHORTNESS OF BREATH: 0
DYSURIA: 0
DIZZINESS: 0
HEMATOCHEZIA: 0
NAUSEA: 0
COUGH: 0
ABDOMINAL PAIN: 0
HEARTBURN: 0
WEAKNESS: 0
DIARRHEA: 0
FEVER: 0
CHILLS: 0
HEMATURIA: 0
JOINT SWELLING: 0
FREQUENCY: 0
MYALGIAS: 0
NERVOUS/ANXIOUS: 1
PALPITATIONS: 0
SORE THROAT: 0
ARTHRALGIAS: 1
HEADACHES: 0
EYE PAIN: 0
BREAST MASS: 0
PARESTHESIAS: 0

## 2021-08-28 RX ORDER — FLUOXETINE 40 MG/1
CAPSULE ORAL
Qty: 90 CAPSULE | Refills: 0 | Status: SHIPPED | OUTPATIENT
Start: 2021-08-28 | End: 2021-09-02

## 2021-09-02 ENCOUNTER — OFFICE VISIT (OUTPATIENT)
Dept: FAMILY MEDICINE | Facility: CLINIC | Age: 46
End: 2021-09-02
Payer: COMMERCIAL

## 2021-09-02 VITALS
BODY MASS INDEX: 28.89 KG/M2 | WEIGHT: 173.4 LBS | HEIGHT: 65 IN | SYSTOLIC BLOOD PRESSURE: 121 MMHG | HEART RATE: 68 BPM | DIASTOLIC BLOOD PRESSURE: 77 MMHG | OXYGEN SATURATION: 98 % | TEMPERATURE: 97.9 F

## 2021-09-02 DIAGNOSIS — Z91.030 BEE STING ALLERGY: ICD-10-CM

## 2021-09-02 DIAGNOSIS — F41.9 ANXIETY: ICD-10-CM

## 2021-09-02 DIAGNOSIS — N95.1 MENOPAUSAL SYNDROME (HOT FLASHES): ICD-10-CM

## 2021-09-02 DIAGNOSIS — K90.89 OTHER SPECIFIED INTESTINAL MALABSORPTION: ICD-10-CM

## 2021-09-02 DIAGNOSIS — Z00.00 ROUTINE HISTORY AND PHYSICAL EXAMINATION OF ADULT: Primary | ICD-10-CM

## 2021-09-02 DIAGNOSIS — Z11.59 NEED FOR HEPATITIS C SCREENING TEST: ICD-10-CM

## 2021-09-02 DIAGNOSIS — M25.552 BILATERAL HIP PAIN: ICD-10-CM

## 2021-09-02 DIAGNOSIS — M25.551 BILATERAL HIP PAIN: ICD-10-CM

## 2021-09-02 DIAGNOSIS — N92.0 POLYMENORRHEA: ICD-10-CM

## 2021-09-02 LAB
ALBUMIN SERPL-MCNC: 3.7 G/DL (ref 3.4–5)
ALP SERPL-CCNC: 70 U/L (ref 40–150)
ALT SERPL W P-5'-P-CCNC: 31 U/L (ref 0–50)
ANION GAP SERPL CALCULATED.3IONS-SCNC: 4 MMOL/L (ref 3–14)
AST SERPL W P-5'-P-CCNC: 17 U/L (ref 0–45)
BASOPHILS # BLD AUTO: 0 10E3/UL (ref 0–0.2)
BASOPHILS NFR BLD AUTO: 0 %
BILIRUB SERPL-MCNC: 0.3 MG/DL (ref 0.2–1.3)
BUN SERPL-MCNC: 9 MG/DL (ref 7–30)
CALCIUM SERPL-MCNC: 8.7 MG/DL (ref 8.5–10.1)
CHLORIDE BLD-SCNC: 108 MMOL/L (ref 94–109)
CHOLEST SERPL-MCNC: 156 MG/DL
CO2 SERPL-SCNC: 28 MMOL/L (ref 20–32)
CREAT SERPL-MCNC: 0.82 MG/DL (ref 0.52–1.04)
DEPRECATED CALCIDIOL+CALCIFEROL SERPL-MC: 54 UG/L (ref 20–75)
EOSINOPHIL # BLD AUTO: 0.1 10E3/UL (ref 0–0.7)
EOSINOPHIL NFR BLD AUTO: 1 %
ERYTHROCYTE [DISTWIDTH] IN BLOOD BY AUTOMATED COUNT: 12.2 % (ref 10–15)
FASTING STATUS PATIENT QL REPORTED: NORMAL
FERRITIN SERPL-MCNC: 206 NG/ML (ref 8–252)
FOLATE SERPL-MCNC: 24.8 NG/ML
GFR SERPL CREATININE-BSD FRML MDRD: 85 ML/MIN/1.73M2
GLUCOSE BLD-MCNC: 85 MG/DL (ref 70–99)
HCT VFR BLD AUTO: 37.6 % (ref 35–47)
HCV AB SERPL QL IA: NONREACTIVE
HDLC SERPL-MCNC: 73 MG/DL
HGB BLD-MCNC: 12.3 G/DL (ref 11.7–15.7)
IRON SATN MFR SERPL: 38 % (ref 15–46)
IRON SERPL-MCNC: 126 UG/DL (ref 35–180)
LDLC SERPL CALC-MCNC: 68 MG/DL
LYMPHOCYTES # BLD AUTO: 2.1 10E3/UL (ref 0.8–5.3)
LYMPHOCYTES NFR BLD AUTO: 31 %
MCH RBC QN AUTO: 31.2 PG (ref 26.5–33)
MCHC RBC AUTO-ENTMCNC: 32.7 G/DL (ref 31.5–36.5)
MCV RBC AUTO: 95 FL (ref 78–100)
MONOCYTES # BLD AUTO: 0.5 10E3/UL (ref 0–1.3)
MONOCYTES NFR BLD AUTO: 7 %
NEUTROPHILS # BLD AUTO: 4.1 10E3/UL (ref 1.6–8.3)
NEUTROPHILS NFR BLD AUTO: 60 %
NONHDLC SERPL-MCNC: 83 MG/DL
PLATELET # BLD AUTO: 241 10E3/UL (ref 150–450)
POTASSIUM BLD-SCNC: 4.1 MMOL/L (ref 3.4–5.3)
PROT SERPL-MCNC: 6.8 G/DL (ref 6.8–8.8)
PTH-INTACT SERPL-MCNC: 56 PG/ML (ref 18–80)
RBC # BLD AUTO: 3.94 10E6/UL (ref 3.8–5.2)
SODIUM SERPL-SCNC: 140 MMOL/L (ref 133–144)
TIBC SERPL-MCNC: 332 UG/DL (ref 240–430)
TRIGL SERPL-MCNC: 73 MG/DL
VIT B12 SERPL-MCNC: 1124 PG/ML (ref 193–986)
WBC # BLD AUTO: 6.8 10E3/UL (ref 4–11)

## 2021-09-02 PROCEDURE — 82306 VITAMIN D 25 HYDROXY: CPT | Performed by: NURSE PRACTITIONER

## 2021-09-02 PROCEDURE — 84425 ASSAY OF VITAMIN B-1: CPT | Performed by: NURSE PRACTITIONER

## 2021-09-02 PROCEDURE — 83550 IRON BINDING TEST: CPT | Performed by: NURSE PRACTITIONER

## 2021-09-02 PROCEDURE — 86803 HEPATITIS C AB TEST: CPT | Performed by: NURSE PRACTITIONER

## 2021-09-02 PROCEDURE — 80053 COMPREHEN METABOLIC PANEL: CPT | Performed by: NURSE PRACTITIONER

## 2021-09-02 PROCEDURE — 85025 COMPLETE CBC W/AUTO DIFF WBC: CPT | Performed by: NURSE PRACTITIONER

## 2021-09-02 PROCEDURE — 80061 LIPID PANEL: CPT | Performed by: NURSE PRACTITIONER

## 2021-09-02 PROCEDURE — 99396 PREV VISIT EST AGE 40-64: CPT | Performed by: NURSE PRACTITIONER

## 2021-09-02 PROCEDURE — 99000 SPECIMEN HANDLING OFFICE-LAB: CPT | Performed by: NURSE PRACTITIONER

## 2021-09-02 PROCEDURE — 82607 VITAMIN B-12: CPT | Performed by: NURSE PRACTITIONER

## 2021-09-02 PROCEDURE — 82728 ASSAY OF FERRITIN: CPT | Performed by: NURSE PRACTITIONER

## 2021-09-02 PROCEDURE — 82746 ASSAY OF FOLIC ACID SERUM: CPT | Performed by: NURSE PRACTITIONER

## 2021-09-02 PROCEDURE — 99213 OFFICE O/P EST LOW 20 MIN: CPT | Mod: 25 | Performed by: NURSE PRACTITIONER

## 2021-09-02 PROCEDURE — 83970 ASSAY OF PARATHORMONE: CPT | Performed by: NURSE PRACTITIONER

## 2021-09-02 PROCEDURE — 36415 COLL VENOUS BLD VENIPUNCTURE: CPT | Performed by: NURSE PRACTITIONER

## 2021-09-02 RX ORDER — NORGESTREL AND ETHINYL ESTRADIOL 0.3-0.03MG
1 KIT ORAL DAILY
Qty: 84 TABLET | Refills: 3 | Status: SHIPPED | OUTPATIENT
Start: 2021-09-02 | End: 2022-08-04

## 2021-09-02 RX ORDER — FLUOXETINE 40 MG/1
CAPSULE ORAL
Qty: 90 CAPSULE | Refills: 3 | Status: SHIPPED | OUTPATIENT
Start: 2021-09-02 | End: 2022-08-04

## 2021-09-02 RX ORDER — EPINEPHRINE 0.3 MG/.3ML
0.3 INJECTION SUBCUTANEOUS ONCE
Qty: 1 EACH | Refills: 3 | Status: SHIPPED | OUTPATIENT
Start: 2021-09-02 | End: 2021-09-02

## 2021-09-02 ASSESSMENT — ENCOUNTER SYMPTOMS
PALPITATIONS: 0
SORE THROAT: 0
ABDOMINAL PAIN: 0
DIARRHEA: 0
NERVOUS/ANXIOUS: 1
EYE PAIN: 0
HEMATURIA: 0
ARTHRALGIAS: 1
CONSTIPATION: 0
HEMATOCHEZIA: 0
BREAST MASS: 0
HEADACHES: 0
WEAKNESS: 0
FREQUENCY: 0
CHILLS: 0
NAUSEA: 0
MYALGIAS: 0
PARESTHESIAS: 0
DYSURIA: 0
JOINT SWELLING: 0
SHORTNESS OF BREATH: 0
DIZZINESS: 0
COUGH: 0
FEVER: 0
HEARTBURN: 0

## 2021-09-02 ASSESSMENT — PAIN SCALES - GENERAL: PAINLEVEL: NO PAIN (0)

## 2021-09-02 ASSESSMENT — MIFFLIN-ST. JEOR: SCORE: 1429.29

## 2021-09-02 NOTE — PROGRESS NOTES
SUBJECTIVE:   CC: Megan Dowd is an 46 year old woman who presents for preventive health visit.       Patient has been advised of split billing requirements and indicates understanding: Yes  Healthy Habits:     Getting at least 3 servings of Calcium per day:  Yes    Bi-annual eye exam:  NO    Dental care twice a year:  Yes    Sleep apnea or symptoms of sleep apnea:  None    Diet:  Other    Frequency of exercise:  2-3 days/week    Duration of exercise:  15-30 minutes    Taking medications regularly:  Yes    Medication side effects:  None    PHQ-2 Total Score: 0    Additional concerns today:  Yes    Hot flashes-  Patient notes that hot flashes have started.  She continues on oral contraceptive without issues.  She wonders if this is normal.    Hip pain-  Patient notes bilateral anterior hip pain with movement, when getting up in the am.  She notes that her mom had both hips replaced.  She would like to be proactive with monitoring her hip health.    Mood-  Anxiety is stable.  Patient denies side effects from meds.        Today's PHQ-2 Score:   PHQ-2 (  [employer]) 2021   Q1: Little interest or pleasure in doing things 0   Q2: Feeling down, depressed or hopeless 0   PHQ-2 Score 0   Q1: Little interest or pleasure in doing things Not at all   Q2: Feeling down, depressed or hopeless Not at all   PHQ-2 Score 0       Abuse: Current or Past (Physical, Sexual or Emotional) - No  Do you feel safe in your environment? Yes    Have you ever done Advance Care Planning? (For example, a Health Directive, POLST, or a discussion with a medical provider or your loved ones about your wishes): Yes, patient states has an Advance Care Planning document and will bring a copy to the clinic.    Social History     Tobacco Use     Smoking status: Former Smoker     Packs/day: 0.30     Years: 18.00     Pack years: 5.40     Types: Cigarettes     Quit date: 2017     Years since quittin.1     Smokeless tobacco: Never Used    Substance Use Topics     Alcohol use: Not Currently     Comment: SOCIALLY         Alcohol Use 2021   Prescreen: >3 drinks/day or >7 drinks/week? Not Applicable   Prescreen: >3 drinks/day or >7 drinks/week? -       Reviewed orders with patient.  Reviewed health maintenance and updated orders accordingly - Yes  BP Readings from Last 3 Encounters:   21 121/77   20 108/70   19 128/74    Wt Readings from Last 3 Encounters:   21 78.7 kg (173 lb 6.4 oz)   20 70.7 kg (155 lb 12.8 oz)   19 104.9 kg (231 lb 3.2 oz)                  Patient Active Problem List   Diagnosis     Insomnia     Anxiety     IBS (irritable bowel syndrome)     CARDIOVASCULAR SCREENING; LDL GOAL LESS THAN 160     Family history of diabetes mellitus     Polymenorrhea     Dysmenorrhea     Ovarian cyst     Morbid obesity (H)     S/P gastric bypass     Other specified intestinal malabsorption     Bee sting allergy     Past Surgical History:   Procedure Laterality Date     ABDOMEN SURGERY  2019    RnY bypass     BREAST SURGERY  3/2013    breast implants     RELEASE CARPAL TUNNEL Right 2018    Procedure: RELEASE CARPAL TUNNEL;  Right carpal tunnel release;  Surgeon: Moe Castro MD;  Location: [facility] OR     SURGICAL HISTORY OF -   3/13    bilateral breast augmentations with implants     TUBAL LIGATION         Social History     Tobacco Use     Smoking status: Former Smoker     Packs/day: 0.30     Years: 18.00     Pack years: 5.40     Types: Cigarettes     Quit date: 2017     Years since quittin.1     Smokeless tobacco: Never Used   Substance Use Topics     Alcohol use: Not Currently     Comment: SOCIALLY     Family History   Problem Relation Age of Onset     Hypertension Mother      Diabetes Father      Hypertension Father      Gynecology Maternal Grandmother         ENDOMETRIOSIS     Heart Disease Maternal Grandmother         MINI HEART ATTACKS     Unknown/Adopted Maternal Grandfather       Cancer Paternal Grandmother      Cancer Paternal Grandfather         CANCER OF SPINE     Gynecology Sister         endometriosis--had hysterectomy     Gynecology Sister         has had cysts--had partial hysterectomy         Current Outpatient Medications   Medication Sig Dispense Refill     ALPRAZolam (XANAX) 0.5 MG tablet Take 1 tablet (0.5 mg) by mouth 3 times daily as needed for anxiety 10 tablet 0     childrens multivitamin chewable tablet Take 2 tablets by mouth daily       EPINEPHrine (ANY BX GENERIC EQUIV) 0.3 MG/0.3ML injection 2-pack Inject 0.3 mLs (0.3 mg) into the muscle as needed for anaphylaxis 1 each 3     FLUoxetine (PROZAC) 40 MG capsule +++NEED APPT+++TAKE 1 CAPSULE (40 MG) BY MOUTH EVERY MORNING. 90 capsule 0     norgestrel-ethinyl estradiol (LOW-OGESTREL) 0.3-30 MG-MCG tablet Take 1 tablet by mouth daily +++NEED ANNUAL EXAM+++ 84 tablet 0     vitamin B-12 (CYANOCOBALAMIN) 2500 MCG sublingual tablet Take 1 tablet (2,500 mcg) by mouth daily       Allergies   Allergen Reactions     Lexapro      Increased anxiety       Nsaids Other (See Comments)     This patient has a history of a Ricardo-en-Y gastric bypass. AVOID NSAIDs and aspirin due to risk of gastric and/or G-J anastomotic ulcers. If Megan must be on short course of NSAIDs or aspirin, use enteric coated if possible and use PPI      Zoloft      NIGHT SWEATS AND RASH FROM SWEATING AND INCREASE IN WEIGHT     Escitalopram Anxiety     Increased anxiety       Breast Cancer Screening:  Any new diagnosis of family breast, ovarian, or bowel cancer? No    FHS-7: No flowsheet data found.    Mammogram Screening: Recommended annual mammography  Pertinent mammograms are reviewed under the imaging tab.    History of abnormal Pap smear: NO - age 30-65 PAP every 5 years with negative HPV co-testing recommended  PAP / HPV Latest Ref Rng & Units 5/30/2017 10/17/2014 6/15/2011   PAP (Historical) - NIL NIL NIL   HPV16 NEG Negative - -   HPV18 NEG Negative - -   HRHPV  "NEG Negative - -     Reviewed and updated as needed this visit by clinical staff  Tobacco  Allergies  Meds  Problems  Med Hx  Surg Hx  Fam Hx          Reviewed and updated as needed this visit by Provider  Tobacco  Allergies  Meds  Problems  Med Hx  Surg Hx  Fam Hx             Review of Systems   Constitutional: Negative for chills and fever.   HENT: Negative for congestion, ear pain, hearing loss and sore throat.    Eyes: Negative for pain and visual disturbance.   Respiratory: Negative for cough and shortness of breath.    Cardiovascular: Negative for chest pain, palpitations and peripheral edema.   Gastrointestinal: Negative for abdominal pain, constipation, diarrhea, heartburn, hematochezia and nausea.   Breasts:  Negative for tenderness, breast mass and discharge.   Genitourinary: Positive for pelvic pain. Negative for dysuria, frequency, genital sores, hematuria, urgency, vaginal bleeding and vaginal discharge.   Musculoskeletal: Positive for arthralgias. Negative for joint swelling and myalgias.   Skin: Negative for rash.   Neurological: Negative for dizziness, weakness, headaches and paresthesias.   Psychiatric/Behavioral: Negative for mood changes. The patient is nervous/anxious.           OBJECTIVE:   /77   Pulse 68   Temp 97.9  F (36.6  C) (Oral)   Ht 1.662 m (5' 5.43\")   Wt 78.7 kg (173 lb 6.4 oz)   SpO2 98%   BMI 28.47 kg/m    Physical Exam  GENERAL: healthy, alert and no distress  EYES: Eyes grossly normal to inspection, PERRL and conjunctivae and sclerae normal  HENT: ear canals and TM's normal, nose and mouth without ulcers or lesions  NECK: no adenopathy, no asymmetry, masses, or scars and thyroid normal to palpation  RESP: lungs clear to auscultation - no rales, rhonchi or wheezes  BREAST: normal without masses, tenderness or nipple discharge, no palpable axillary masses or adenopathy and implant bilateral  CV: regular rate and rhythm, normal S1 S2, no S3 or S4, no murmur, " click or rub, no peripheral edema and peripheral pulses strong  ABDOMEN: soft, nontender, no hepatosplenomegaly, no masses and bowel sounds normal  MS: Tenderness to palpation of anterior hip bilaterally, pain with external range of motion bilaterally.  PSYCH: mentation appears normal, affect normal/bright        ASSESSMENT/PLAN:   (Z00.00) Routine history and physical examination of adult  (primary encounter diagnosis)  Comment:   Plan: Lipid panel reflex to direct LDL Fasting            (Z11.59) Need for hepatitis C screening test  Comment:   Plan: Hepatitis C Screen Reflex to HCV RNA Quant and         Genotype            (Z91.030) Bee sting allergy  Comment:   Plan: EPINEPHrine (ANY BX GENERIC EQUIV) 0.3 MG/0.3ML        injection 2-pack            (N92.0) Polymenorrhea  Comment:   Plan: norgestrel-ethinyl estradiol (LOW-OGESTREL)         0.3-30 MG-MCG tablet            (F41.9) Anxiety  Comment: Stable.  Continue current treatment plan and medications.   Plan: FLUoxetine (PROZAC) 40 MG capsule            (M25.551,  M25.552) Bilateral hip pain  Comment:   Plan: XR Pelvis and Hip Bilateral 2 Views, [facility] PT and        Hand Referral            (K90.89) Other specified intestinal malabsorption  Comment:   Plan: CBC with platelets and differential,         Comprehensive metabolic panel (BMP + Alb, Alk         Phos, ALT, AST, Total. Bili, TP), Folate,         Vitamin B12, Iron and iron binding capacity,         Ferritin, Vitamin B1 whole blood, Parathyroid         Hormone Intact, Vitamin D Deficiency            (N95.1) Menopausal syndrome (hot flashes)  Comment: patient to monitor.  She may trial off her oral contraceptive to see if she continues with menses.  Plan: continue to monitor.    Patient has been advised of split billing requirements and indicates understanding: Yes  COUNSELING:  Reviewed preventive health counseling, as reflected in patient instructions       Regular exercise       Healthy diet/nutrition        "Consider Hep C screening for all patients one time for ages 18-79 years    Estimated body mass index is 28.47 kg/m  as calculated from the following:    Height as of this encounter: 1.662 m (5' 5.43\").    Weight as of this encounter: 78.7 kg (173 lb 6.4 oz).    Weight management plan: Discussed healthy diet and exercise guidelines    She reports that she quit smoking about 4 years ago. Her smoking use included cigarettes. She has a 5.40 pack-year smoking history. She has never used smokeless tobacco.      Counseling Resources:  ATP IV Guidelines  Pooled Cohorts Equation Calculator  Breast Cancer Risk Calculator  BRCA-Related Cancer Risk Assessment: FHS-7 Tool  FRAX Risk Assessment  ICSI Preventive Guidelines  Dietary Guidelines for Americans, 2010  USDA's MyPlate  ASA Prophylaxis  Lung CA Screening    ANDREW Waterman CNP  [facility]  "

## 2021-09-02 NOTE — RESULT ENCOUNTER NOTE
Dear Megan,    Your recent test results are attached.      No anemia.      If you have any questions please feel free to contact [phone] or myself via [employer].    Sincerely,  Geneva Bray, CNP

## 2021-09-05 LAB — VIT B1 PYROPHOSHATE BLD-SCNC: 121 NMOL/L

## 2021-09-15 ENCOUNTER — ANCILLARY PROCEDURE (OUTPATIENT)
Dept: GENERAL RADIOLOGY | Facility: CLINIC | Age: 46
End: 2021-09-15
Attending: NURSE PRACTITIONER
Payer: COMMERCIAL

## 2021-09-15 DIAGNOSIS — Z53.9 ERRONEOUS ENCOUNTER--DISREGARD: Primary | ICD-10-CM

## 2021-09-15 DIAGNOSIS — M25.552 BILATERAL HIP PAIN: ICD-10-CM

## 2021-09-15 DIAGNOSIS — M25.551 BILATERAL HIP PAIN: ICD-10-CM

## 2021-09-15 PROCEDURE — 73523 X-RAY EXAM HIPS BI 5/> VIEWS: CPT | Performed by: RADIOLOGY

## 2021-09-15 NOTE — RESULT ENCOUNTER NOTE
Dear Megan,    Your recent test results are attached.      Normal hip x-ray.  Would you want to try physical therapy to help with pain?  I suspect this is bursitis.    If you have any questions please feel free to contact [phone] or myself via [email or web address].    Sincerely,  Geneva Bray, CNP

## 2021-09-26 ENCOUNTER — HEALTH MAINTENANCE LETTER (OUTPATIENT)
Age: 46
End: 2021-09-26

## 2021-11-21 ENCOUNTER — HEALTH MAINTENANCE LETTER (OUTPATIENT)
Age: 46
End: 2021-11-21

## 2021-12-21 ENCOUNTER — ANCILLARY PROCEDURE (OUTPATIENT)
Dept: MAMMOGRAPHY | Facility: CLINIC | Age: 46
End: 2021-12-21
Attending: NURSE PRACTITIONER
Payer: COMMERCIAL

## 2021-12-21 DIAGNOSIS — Z12.31 VISIT FOR SCREENING MAMMOGRAM: ICD-10-CM

## 2021-12-21 PROCEDURE — 77067 SCR MAMMO BI INCL CAD: CPT | Mod: TC | Performed by: RADIOLOGY

## 2021-12-21 PROCEDURE — 77063 BREAST TOMOSYNTHESIS BI: CPT | Mod: TC | Performed by: RADIOLOGY

## 2021-12-22 NOTE — RESULT ENCOUNTER NOTE
Dear Megan,    Your recent test results are attached.      Normal mammogram.    If you have any questions please feel free to contact [phone] or myself via [employer].    Sincerely,  Geneva Bray, CNP

## 2022-06-30 ENCOUNTER — MYC REFILL (OUTPATIENT)
Dept: FAMILY MEDICINE | Facility: CLINIC | Age: 47
End: 2022-06-30

## 2022-06-30 DIAGNOSIS — F41.9 ANXIETY: ICD-10-CM

## 2022-06-30 RX ORDER — ALPRAZOLAM 0.5 MG
0.5 TABLET ORAL 3 TIMES DAILY PRN
OUTPATIENT
Start: 2022-06-30

## 2022-08-03 ASSESSMENT — ENCOUNTER SYMPTOMS
CONSTIPATION: 0
FEVER: 0
HEMATURIA: 0
HEMATOCHEZIA: 0
HEARTBURN: 0
HEADACHES: 0
NERVOUS/ANXIOUS: 1
COUGH: 0
FREQUENCY: 0
JOINT SWELLING: 0
SORE THROAT: 0
EYE PAIN: 0
SHORTNESS OF BREATH: 0
DIARRHEA: 0
PALPITATIONS: 0
NAUSEA: 0
WEAKNESS: 0
ABDOMINAL PAIN: 0
CHILLS: 0
ARTHRALGIAS: 1
PARESTHESIAS: 0
BREAST MASS: 0
MYALGIAS: 0
DYSURIA: 0
DIZZINESS: 0

## 2022-08-04 ENCOUNTER — OFFICE VISIT (OUTPATIENT)
Dept: FAMILY MEDICINE | Facility: CLINIC | Age: 47
End: 2022-08-04
Payer: COMMERCIAL

## 2022-08-04 VITALS
SYSTOLIC BLOOD PRESSURE: 100 MMHG | BODY MASS INDEX: 29.25 KG/M2 | HEIGHT: 66 IN | HEART RATE: 60 BPM | WEIGHT: 182 LBS | RESPIRATION RATE: 16 BRPM | TEMPERATURE: 99 F | DIASTOLIC BLOOD PRESSURE: 70 MMHG

## 2022-08-04 DIAGNOSIS — N92.0 POLYMENORRHEA: ICD-10-CM

## 2022-08-04 DIAGNOSIS — N95.1 MENOPAUSAL SYNDROME (HOT FLASHES): ICD-10-CM

## 2022-08-04 DIAGNOSIS — M25.552 BILATERAL HIP PAIN: ICD-10-CM

## 2022-08-04 DIAGNOSIS — M25.551 BILATERAL HIP PAIN: ICD-10-CM

## 2022-08-04 DIAGNOSIS — M65.4 DE QUERVAIN'S DISEASE (TENOSYNOVITIS): ICD-10-CM

## 2022-08-04 DIAGNOSIS — F41.9 ANXIETY: Primary | ICD-10-CM

## 2022-08-04 PROBLEM — E66.01 MORBID OBESITY (H): Status: RESOLVED | Noted: 2019-04-08 | Resolved: 2022-08-04

## 2022-08-04 PROCEDURE — 99214 OFFICE O/P EST MOD 30 MIN: CPT | Performed by: NURSE PRACTITIONER

## 2022-08-04 RX ORDER — NORGESTREL AND ETHINYL ESTRADIOL 0.3-0.03MG
1 KIT ORAL DAILY
Qty: 84 TABLET | Refills: 3 | Status: SHIPPED | OUTPATIENT
Start: 2022-08-04 | End: 2022-10-21

## 2022-08-04 RX ORDER — FLUOXETINE 40 MG/1
CAPSULE ORAL
Qty: 90 CAPSULE | Refills: 3 | Status: SHIPPED | OUTPATIENT
Start: 2022-08-04 | End: 2023-04-24

## 2022-08-04 RX ORDER — ALPRAZOLAM 0.5 MG
0.5 TABLET ORAL 3 TIMES DAILY PRN
Qty: 10 TABLET | Refills: 0 | Status: SHIPPED | OUTPATIENT
Start: 2022-08-04 | End: 2022-10-26

## 2022-08-04 RX ORDER — ASCORBIC ACID 100 MG
TABLET,CHEWABLE ORAL
COMMUNITY

## 2022-08-04 RX ORDER — VITAMIN B COMPLEX
TABLET ORAL DAILY
COMMUNITY

## 2022-08-04 ASSESSMENT — PATIENT HEALTH QUESTIONNAIRE - PHQ9
5. POOR APPETITE OR OVEREATING: SEVERAL DAYS
SUM OF ALL RESPONSES TO PHQ QUESTIONS 1-9: 2

## 2022-08-04 ASSESSMENT — ANXIETY QUESTIONNAIRES
1. FEELING NERVOUS, ANXIOUS, OR ON EDGE: SEVERAL DAYS
GAD7 TOTAL SCORE: 3
6. BECOMING EASILY ANNOYED OR IRRITABLE: SEVERAL DAYS
5. BEING SO RESTLESS THAT IT IS HARD TO SIT STILL: NOT AT ALL
2. NOT BEING ABLE TO STOP OR CONTROL WORRYING: NOT AT ALL
3. WORRYING TOO MUCH ABOUT DIFFERENT THINGS: NOT AT ALL
GAD7 TOTAL SCORE: 3
7. FEELING AFRAID AS IF SOMETHING AWFUL MIGHT HAPPEN: NOT AT ALL

## 2022-08-04 NOTE — PATIENT INSTRUCTIONS
Ortho referral placed for hand symptoms    Physical therapy referral placed    Increase the fluoxetine to 60 mg daily

## 2022-08-04 NOTE — PROGRESS NOTES
"  Assessment & Plan     Anxiety  More irritability lately and hot flashes, will do a trial of increasing the fluoxetine to 60 mg for better control of symptoms.   - FLUoxetine (PROZAC) 40 MG capsule; TAKE 1 CAPSULE (40 MG) BY MOUTH EVERY MORNING.  - ALPRAZolam (XANAX) 0.5 MG tablet; Take 1 tablet (0.5 mg) by mouth 3 times daily as needed for anxiety  - FLUoxetine (PROZAC) 20 MG capsule; Take 1 capsule (20 mg) by mouth daily In addition to the 40 mg capsule for a total of 60 mg daily    Polymenorrhea  Happy with current medication, consider a trial off in the next year.   - norgestrel-ethinyl estradiol (LOW-OGESTREL) 0.3-30 MG-MCG tablet; Take 1 tablet by mouth daily    De Quervain's disease (tenosynovitis)  Symptoms consistent with De Quervain's, ortho referral placed.  Consider thumb spica splint.  Symptomatic care and follow up discussed.  - Orthopedic  Referral; Future    Bilateral hip pain  Consistent with a bursitis. X ray reviewed from 9/21 which was normal. PT referral placed, consider injection if not improving.   - Physical Therapy Referral; Future    Menopausal syndrome (hot flashes)  Per above.   - FLUoxetine (PROZAC) 40 MG capsule; TAKE 1 CAPSULE (40 MG) BY MOUTH EVERY MORNING.  - FLUoxetine (PROZAC) 20 MG capsule; Take 1 capsule (20 mg) by mouth daily In addition to the 40 mg capsule for a total of 60 mg daily    Return in about 4 weeks (around 9/1/2022), or if symptoms worsen or fail to improve.    ANDREW Chappell CNP  [facility]    Aga Guzman is a 47 year old, presenting for the following health issues:  Anxiety and Night Sweats    HPI     Anxiety Follow-Up    How are you doing with your anxiety since your last visit? \"okay\"    Are you having other symptoms that might be associated with anxiety? No    Have you had a significant life event? No     Are you feeling depressed? No    Do you have any concerns with your use of alcohol or other drugs? " "No    Social History     Tobacco Use     Smoking status: Former Smoker     Packs/day: 0.30     Years: 18.00     Pack years: 5.40     Types: Cigarettes     Quit date: 2017     Years since quittin.0     Smokeless tobacco: Never Used   Substance Use Topics     Alcohol use: Not Currently     Comment: SOCIALLY     Drug use: No     RAND-7 SCORE 2016   Total Score - - -   Total Score 3 6 3     PHQ 2016   PHQ-9 Total Score 6 7 2   Q9: Thoughts of better off dead/self-harm past 2 weeks Not at all Not at all Not at all       Concern - Night Sweats   Onset: 4 months   Description: night sweats nightly   Intensity: moderate  Progression of Symptoms:  worsening  Accompanying Signs & Symptoms: not bleeding when she should with B/C, will have cramping, irritable, and night sweats get worse  Previous history of similar problem: none   Therapies tried and outcome:  none     Musculoskeletal problem/pain      Duration: over 1 year     Description  Location: hands, hips and low back     Intensity:  moderate    Accompanying signs and symptoms: none    History  Previous similar problem: YES  Previous evaluation:  x-ray of hips     Precipitating or alleviating factors:  Trauma or overuse: no   Aggravating factors include: worse around cycle time, exercise     Therapies tried and outcome: acetaminophen and Ibuprofen       Review of Systems   Constitutional, HEENT, cardiovascular, pulmonary, gi and gu systems are negative, except as otherwise noted.      Objective    /70 (Cuff Size: Adult Regular)   Pulse 60   Temp 99  F (37.2  C) (Tympanic)   Resp 16   Ht 1.664 m (5' 5.5\")   Wt 82.6 kg (182 lb)   LMP 2022 (Approximate)   BMI 29.83 kg/m    Body mass index is 29.83 kg/m .  Physical Exam   GENERAL: healthy, alert and no distress  NECK: no adenopathy, no asymmetry, masses, or scars and thyroid normal to palpation  RESP: lungs clear to auscultation - no rales, rhonchi " or wheezes  CV: regular rate and rhythm, normal S1 S2, no S3 or S4, no murmur  MS: tenderness to palpation bilateral trochanter bursa, full range of motion, tenderness to palpation over bilateral radial styloid, positive Finkelstein   PSYCH: mentation appears normal, affect normal/bright                .  ..

## 2022-08-15 ENCOUNTER — HOSPITAL ENCOUNTER (OUTPATIENT)
Dept: PHYSICAL THERAPY | Facility: CLINIC | Age: 47
Setting detail: THERAPIES SERIES
Discharge: HOME | End: 2022-08-15
Attending: NURSE PRACTITIONER
Payer: COMMERCIAL

## 2022-08-15 DIAGNOSIS — M25.551 BILATERAL HIP PAIN: ICD-10-CM

## 2022-08-15 DIAGNOSIS — M25.552 BILATERAL HIP PAIN: ICD-10-CM

## 2022-08-15 PROCEDURE — 97161 PT EVAL LOW COMPLEX 20 MIN: CPT | Mod: GP | Performed by: PHYSICAL MEDICINE & REHABILITATION

## 2022-08-15 PROCEDURE — 97140 MANUAL THERAPY 1/> REGIONS: CPT | Mod: GP | Performed by: PHYSICAL MEDICINE & REHABILITATION

## 2022-08-15 PROCEDURE — 97110 THERAPEUTIC EXERCISES: CPT | Mod: GP | Performed by: PHYSICAL MEDICINE & REHABILITATION

## 2022-08-15 NOTE — PROGRESS NOTES
08/15/22 0600   General Information   Type of Visit Initial OP Ortho PT Evaluation   Start of Care Date 08/15/22   Referring Physician Candy Soares CNP   Patient/Family Goals Statement To avoid potential future surgery of hips.   Orders Evaluate and Treat   Date of Order 08/04/22   Certification Required? No   Medical Diagnosis Bilateral hip pain   Surgical/Medical history reviewed Yes   Precautions/Limitations no known precautions/limitations   Body Part(s)   Body Part(s) Hip   Presentation and Etiology   Pertinent history of current problem (include personal factors and/or comorbidities that impact the POC) Patient reports she was told by previous doctor to participate in physical therapy and was recently seen by NP and told same as well. Pain has been ongoing for over a year primarily at lateral hip. Pain provoked with household activities. Denies symptoms down legs. Describes as burning sensation versus sharp.   Impairments A. Pain;E. Decreased flexibility   Functional Limitations perform activities of daily living;perform desired leisure / sports activities   Symptom Location Bilateral lateral hips   How/Where did it occur From insidious onset   Onset date of current episode/exacerbation 08/15/21   Chronicity Chronic   Pain rating (0-10 point scale) Best (/10);Worst (/10)   Best (/10) 3   Worst (/10) 7   Pain quality A. Sharp;C. Aching;D. Burning   Frequency of pain/symptoms A. Constant   Pain/symptoms are: Worse during the night   Pain/symptoms exacerbated by J. ADL;K. Home tasks   Pain/symptoms eased by C. Rest;G. Heat;I. OTC medication(s)   Progression of symptoms since onset: Unchanged   Current / Previous Interventions   Diagnostic Tests: X-ray   X-ray Results unremarkable   Current Level of Function   Patient role/employment history A. Employed   Employment Comments mostly computer work   Fall Risk Screen   Fall screen completed by PT   Have you fallen 2 or more times in the past year? No   Have you  fallen and had an injury in the past year? No   Is patient a fall risk? No   Abuse Screen (yes response referral indicated)   Feels Unsafe at Home or Work/School no   Feels Threatened by Someone no   Does Anyone Try to Keep You From Having Contact with Others or Doing Things Outside Your Home? no   Physical Signs of Abuse Present no   Hip Objective Findings   Gait/Locomotion no antalgic or compensatory mechanisms   Balance/Proprioception (Single Leg Stance) fair   Side (if bilateral, select both right and left) Right;Left   Hip ROM Comments all AROM B hips WNL   Lumbar ROM negative SLR and slump tests   Pelvic Screen negative SIJ tests   Hip/Knee Strength Comments pain with hip flexion, abduction, and ER   Femoral Nerve Stretch Test negative   Gluteal Tendopathy Test mild positive   Resisted Hip Adduction Test negative   Straight Leg Raise Test negative   Scour Test negative   JACOB Test mild positive, possible false   FADIR Test negative   Hip Special Tests Comments s/s consistent with GT bursitis   Palpation TTP at bilateral GT   Observation no signs of acute distress   Jose A Flexibility Test mod iliopsoas tightness   Obers/ITB Flexibility mild TFL tightness   Right Hamstring Flexibility -10 degrees in 90/90   Left Hamstring Flexibility -10 degrees in 90/90   Right Piriformis Flexibility normal   Left Piriformis Flexibility normal   Right Prone Quad Flexibility mild tightness   Left Prone Quad Flexibility mild tightness   Right Hip Flexion Strength 4/5   Left Hip Flexion Strength 4-/5   Right Hip Abduction Strength 4-/5   Left Hip Abduction Strength 4-/5   Right Hip Extension Strength 4/5   Left Hip Extension Strength 4/5   Right Hip IR Strength 4/5   Left Hip IR Strength 4/5   Right Hip ER Strength 4-/5   Left Hip ER Strength 4-/5   Right Knee Flexion Strength 5/5   Left Knee Flexion Strength 5/5   Right Knee Extension Strength 5/5   Left Knee Extension Strength 5/5   Planned Therapy Interventions   Planned  Therapy Interventions balance training;gait training;manual therapy;joint mobilization;neuromuscular re-education;ROM;strengthening;stretching   Planned Modality Interventions   Planned Modality Interventions Hot packs;Biofeedback   Clinical Impression   Criteria for Skilled Therapeutic Interventions Met yes, treatment indicated   PT Diagnosis bilateral hip pain   Influenced by the following impairments pain, weakness   Functional limitations due to impairments vaccuuming, walking, squatting   Clinical Presentation Stable/Uncomplicated   Clinical Presentation Rationale clinical judgement   Clinical Decision Making (Complexity) Low complexity   Therapy Frequency 1 time/week   Predicted Duration of Therapy Intervention (days/wks) 8 weeks   Risk & Benefits of therapy have been explained Yes   Patient, Family & other staff in agreement with plan of care Yes   Clinical Impression Comments Patient presents to physical therapy with bilateral hip pain that is consistent with greater trochanteric bursitis. Weakness/pain found in hip flexion, abduction, and external rotation which in turn will affect ability to perform ADLs. Will benefit from PT to address functional deficits to return to PLOF and maximize functional capacity. PT prognosis good due to patient buy in and motivation level.   Education Assessment   Preferred Learning Style Listening;Reading;Pictures/video;Demonstration   Barriers to Learning No barriers   ORTHO GOALS   PT Ortho Eval Goals 1;2;3;4   Ortho Goal 1   Goal Identifier STG   Goal Description Patient to report house chore acitivites do not increase bilateral hip pain greater than 4/10 pain.   Target Date 09/12/22   Ortho Goal 2   Goal Identifier STG   Goal Description Patient to report at worst bilateral hip pain at night less than 5/10 pain.   Target Date 09/12/22   Ortho Goal 3   Goal Identifier LTG   Goal Description Patient to report ability to walk over a mile with less than or equal to 1/10  bilateral hip pain.   Target Date 10/10/22   Ortho Goal 4   Goal Identifier LTG   Goal Description Patient to report no bilateral hip pain with any house chores.   Target Date 10/10/22   Total Evaluation Time   PT Tello, Low Complexity Minutes (08146) 20     Please contact me with any questions or concerns.    Vikas Vance, PT, DPT, CSCS

## 2022-08-19 NOTE — PROGRESS NOTES
Chief Complaint:   Chief Complaint   Patient presents with     Hand Pain     Bilateral thumb/hand pain. Onset: longer than 6 months. Pain is around the base of the thumb and fat pad. Anytime she is using her hands there is pain. Left hand is worse than the other. She has a hx of right carpal tunnel release 6/21/18. Has carpal tunnel syndrome in bilateral wrists but has not had the left one fixed.       Megan Dowd is seen today in the [facility] for evaluation of bilateral thumb pain at the request of ANDREW Chappell CNP      HPI: Megan Dowd is a 47 year old female , right -hand dominant, who presents for evaluation and management of a bilateral wrist pain, no known injury. Pain has been present for more than 6 months. Actually when we saw her in 2018 she was having thumb pain at that time Since that time, pain has sharon. She locates pain around the base of the thumbs and fat pad. Pain with using the hands. Left more painful than the right. Has pain at rest.    Using topical osteoarthritis cream, thinks helps a little.    History of right carpal tunnel release 6/21/2018, also has left carpal tunnel syndrome but no surgery on the left.      History of bilateral carpal tunnel syndrome. Is now status post right carpal tunnel release 6/2018.      She reports having mod pain/discomfort around the wrist site. She denies associated numbness or tingling. She denies any  injuries to her upper extremity.   Symptoms: pain.  Location of symptoms: base of thumbs, left > right .  Pain severity: 5/10  Pain quality: dull, aching, throbbing and burning  Frequency of symptoms: are constant  Aggravating factors: activities/using the hands.  Relieving factors: rest. Topical ointments.    Previous treatment: topical ointments    Past medical history:  has a past medical history of Anxiety, Gastroesophageal reflux disease, History of diarrhea, IBS (irritable bowel syndrome), Insomnia, and  Motion sickness.    She has no past medical history of Arthritis, Cancer (H), Cerebral infarction (H), Congestive heart failure (H), COPD (chronic obstructive pulmonary disease) (H), Depressive disorder, Diabetes (H), Heart disease, History of blood transfusion, Hypertension, Thyroid disease, or Uncomplicated asthma.   Patient Active Problem List    Diagnosis Date Noted     S/P gastric bypass 07/22/2020     Priority: Medium     Other specified intestinal malabsorption 07/22/2020     Priority: Medium     Bee sting allergy 07/22/2020     Priority: Medium     Polymenorrhea 06/26/2013     Priority: Medium     Dysmenorrhea 06/26/2013     Priority: Medium     Ovarian cyst 06/26/2013     Priority: Medium     Family history of diabetes mellitus 06/15/2011     Priority: Medium     CARDIOVASCULAR SCREENING; LDL GOAL LESS THAN 160 10/31/2010     Priority: Medium     Insomnia      Priority: Medium     Anxiety      Priority: Medium     IBS (irritable bowel syndrome)      Priority: Medium     Past surgical history:  has a past surgical history that includes tubal ligation (4/07); surgical history of -  (3/13); Breast surgery (3/2013); Release carpal tunnel (Right, 6/21/2018); and Abdomen surgery (04/2019).     Medications:    Current Outpatient Medications   Medication Sig Dispense Refill     ALPRAZolam (XANAX) 0.5 MG tablet Take 1 tablet (0.5 mg) by mouth 3 times daily as needed for anxiety 10 tablet 0     Ascorbic Acid (VITAMIN C) 100 MG CHEW        childrens multivitamin chewable tablet Take 2 tablets by mouth daily       FLUoxetine (PROZAC) 20 MG capsule Take 1 capsule (20 mg) by mouth daily In addition to the 40 mg capsule for a total of 60 mg daily 90 capsule 3     FLUoxetine (PROZAC) 40 MG capsule TAKE 1 CAPSULE (40 MG) BY MOUTH EVERY MORNING. 90 capsule 3     norgestrel-ethinyl estradiol (LOW-OGESTREL) 0.3-30 MG-MCG tablet Take 1 tablet by mouth daily 84 tablet 3     vitamin B-12 (CYANOCOBALAMIN) 2500 MCG sublingual  "tablet Take 1 tablet (2,500 mcg) by mouth daily       Vitamin D3 (CHOLECALCIFEROL) 25 mcg (1000 units) tablet Take by mouth daily          Allergies:     Allergies   Allergen Reactions     Lexapro      Increased anxiety       Nsaids Other (See Comments)     This patient has a history of a Ricardo-en-Y gastric bypass. AVOID NSAIDs and aspirin due to risk of gastric and/or G-J anastomotic ulcers. If Megan must be on short course of NSAIDs or aspirin, use enteric coated if possible and use PPI      Zoloft      NIGHT SWEATS AND RASH FROM SWEATING AND INCREASE IN WEIGHT     Escitalopram Anxiety     Increased anxiety        Family History: family history includes Cancer in her paternal grandfather and paternal grandmother; Diabetes in her father; Gynecology in her maternal grandmother, sister, and sister; Heart Disease in her maternal grandmother; Hypertension in her father and mother; Unknown/Adopted in her maternal grandfather.     Social History:  reports that she quit smoking about 5 years ago. Her smoking use included cigarettes. She has a 5.40 pack-year smoking history. She has never used smokeless tobacco. She reports previous alcohol use. She reports that she does not use drugs.    Review of Systems:  ROS: 10 point ROS neg other than the symptoms noted above in the HPI and past medical history.    Physical Exam  GENERAL APPEARANCE: healthy, alert, no distress.   SKIN: no suspicious lesions or rashes  NEURO: Normal strength and tone, mentation intact and speech normal  PSYCH:  mentation appears normal and affect normal. Not anxious.  RESPIRATORY: No increased work of breathing.    /78   Pulse 69   Ht 1.664 m (5' 5.5\")   Wt 81.6 kg (180 lb)   LMP 07/14/2022 (Approximate)   BMI 29.50 kg/m       right HAND / WRIST EXAM:    Skin intact. No abnormal skin discoloration, erythema or ecchymosis.   Volar carpal tunnel syndrome surgery scar.  Normal wear pattern, color and tone.  No observable or palpable masses of " the fingers or palm or wrist.  No palpable triggering of fingers.   No observable or palpable cords or nodules of the fingers or palm.    There is no swelling in the hand, wrist, thumb  There is moderate tenderness in the carpometacarpal joint of the thumb.  There is no ecchymosis.  There is no erythema of the surrounding skin.  There is no maceration of the skin.  There is no deformity in the area.  Intact extensors. No extensor lag.      1st carpometacarpal grind: positive     Intact sensation light touch median, radial, ulnar nerves of the hand  Intact sensation to the radial and ulnar digital nerves of the fingers, as well as the finger tips.  Intact epl fpl fdp edc wrist flexion/extension biceps/triceps deltoid  Brisk capillary refill to all fingers.   Palpable radial pulse, 2+.      LET HAND / WRIST EXAM:    Skin intact. No abnormal skin discoloration, erythema or ecchymosis.   Normal wear pattern, color and tone.  No observable or palpable masses of the fingers or palm or wrist.  No palpable triggering of fingers.   No observable or palpable cords or nodules of the fingers or palm.    There is no swelling in the hand, wrist, thumb  There is moderate tenderness in the carpometacarpal joint of the thumb.  There is no ecchymosis.  There is no erythema of the surrounding skin.  There is no maceration of the skin.  There is no deformity in the area.  Intact extensors. No extensor lag.      1st carpometacarpal grind: positive     Intact sensation light touch median, radial, ulnar nerves of the hand  Intact sensation to the radial and ulnar digital nerves of the fingers, as well as the finger tips.  Intact epl fpl fdp edc wrist flexion/extension biceps/triceps deltoid  Brisk capillary refill to all fingers.   Palpable radial pulse, 2+.      X-rays:  3 views bilateral thumbs from 8/19/2022 were reviewed in clinic today. On my review, moderate-severe bilateral thumb carpometacarpal osteoarthritis .    EMG done on  5/21/2018 bilateral wrists, Dr. Olea, [facility].  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    Assessment: 48yo RHD female with chronic bilateral thumb pain, carpometacarpal osteoarthritis..    Plan:  * reviewed exam findings and xrays with patient. Consistent with carpometacarpal basilar thumb arthritis  * treatment options discussed  * rest  * ice  * NSAIDS  * activity modification  * thumb based splint immobilization with activities (refer to Hand Therapy/OT for splint, strengthening, rom) - patient declines.  * cortisone injections, patient declines.  * lastly surgery (LRTI, anchovy procedure versus fusion)  * return to clinic as needed.            Moe Castro M.D., M.S.  Dept. of Orthopaedic Surgery  [facility]

## 2022-08-24 ENCOUNTER — ANCILLARY PROCEDURE (OUTPATIENT)
Dept: GENERAL RADIOLOGY | Facility: CLINIC | Age: 47
End: 2022-08-24
Attending: ORTHOPAEDIC SURGERY
Payer: COMMERCIAL

## 2022-08-24 ENCOUNTER — OFFICE VISIT (OUTPATIENT)
Dept: ORTHOPEDICS | Facility: CLINIC | Age: 47
End: 2022-08-24

## 2022-08-24 VITALS
HEART RATE: 69 BPM | HEIGHT: 66 IN | BODY MASS INDEX: 28.93 KG/M2 | SYSTOLIC BLOOD PRESSURE: 112 MMHG | WEIGHT: 180 LBS | DIASTOLIC BLOOD PRESSURE: 78 MMHG

## 2022-08-24 DIAGNOSIS — M79.645 BILATERAL THUMB PAIN: ICD-10-CM

## 2022-08-24 DIAGNOSIS — M18.0 PRIMARY OSTEOARTHRITIS OF BOTH FIRST CARPOMETACARPAL JOINTS: Primary | ICD-10-CM

## 2022-08-24 DIAGNOSIS — M79.644 BILATERAL THUMB PAIN: ICD-10-CM

## 2022-08-24 PROCEDURE — 73140 X-RAY EXAM OF FINGER(S): CPT | Mod: TC | Performed by: RADIOLOGY

## 2022-08-24 PROCEDURE — 99203 OFFICE O/P NEW LOW 30 MIN: CPT | Performed by: ORTHOPAEDIC SURGERY

## 2022-08-24 ASSESSMENT — PAIN SCALES - GENERAL: PAINLEVEL: MODERATE PAIN (5)

## 2022-08-24 NOTE — LETTER
8/24/2022         RE: Megan Dowd  [address] [facility] [address] [id number]        Dear Colleague,    Thank you for referring your patient, Megan Dowd, to the [facility] [place]. Please see a copy of my visit note below.    Chief Complaint:   Chief Complaint   Patient presents with     Hand Pain     Bilateral thumb/hand pain. Onset: longer than 6 months. Pain is around the base of the thumb and fat pad. Anytime she is using her hands there is pain. Left hand is worse than the other. She has a hx of right carpal tunnel release 6/21/18. Has carpal tunnel syndrome in bilateral wrists but has not had the left one fixed.       Megan Dowd is seen today in the [facility] for evaluation of bilateral thumb pain at the request of ANDREW Chappell CNP      HPI: Megan Dowd is a 47 year old female , right -hand dominant, who presents for evaluation and management of a bilateral wrist pain, no known injury. Pain has been present for more than 6 months. Actually when we saw her in 2018 she was having thumb pain at that time Since that time, pain has sharon. She locates pain around the base of the thumbs and fat pad. Pain with using the hands. Left more painful than the right. Has pain at rest.    Using topical osteoarthritis cream, thinks helps a little.    History of right carpal tunnel release 6/21/2018, also has left carpal tunnel syndrome but no surgery on the left.      History of bilateral carpal tunnel syndrome. Is now status post right carpal tunnel release 6/2018.      She reports having mod pain/discomfort around the wrist site. She denies associated numbness or tingling. She denies any  injuries to her upper extremity.   Symptoms: pain.  Location of symptoms: base of thumbs, left > right .  Pain severity: 5/10  Pain quality: dull, aching, throbbing and burning  Frequency of symptoms: are constant  Aggravating factors:  activities/using the hands.  Relieving factors: rest. Topical ointments.    Previous treatment: topical ointments    Past medical history:  has a past medical history of Anxiety, Gastroesophageal reflux disease, History of diarrhea, IBS (irritable bowel syndrome), Insomnia, and Motion sickness.    She has no past medical history of Arthritis, Cancer (H), Cerebral infarction (H), Congestive heart failure (H), COPD (chronic obstructive pulmonary disease) (H), Depressive disorder, Diabetes (H), Heart disease, History of blood transfusion, Hypertension, Thyroid disease, or Uncomplicated asthma.   Patient Active Problem List    Diagnosis Date Noted     S/P gastric bypass 07/22/2020     Priority: Medium     Other specified intestinal malabsorption 07/22/2020     Priority: Medium     Bee sting allergy 07/22/2020     Priority: Medium     Polymenorrhea 06/26/2013     Priority: Medium     Dysmenorrhea 06/26/2013     Priority: Medium     Ovarian cyst 06/26/2013     Priority: Medium     Family history of diabetes mellitus 06/15/2011     Priority: Medium     CARDIOVASCULAR SCREENING; LDL GOAL LESS THAN 160 10/31/2010     Priority: Medium     Insomnia      Priority: Medium     Anxiety      Priority: Medium     IBS (irritable bowel syndrome)      Priority: Medium     Past surgical history:  has a past surgical history that includes tubal ligation (4/07); surgical history of -  (3/13); Breast surgery (3/2013); Release carpal tunnel (Right, 6/21/2018); and Abdomen surgery (04/2019).     Medications:    Current Outpatient Medications   Medication Sig Dispense Refill     ALPRAZolam (XANAX) 0.5 MG tablet Take 1 tablet (0.5 mg) by mouth 3 times daily as needed for anxiety 10 tablet 0     Ascorbic Acid (VITAMIN C) 100 MG CHEW        childrens multivitamin chewable tablet Take 2 tablets by mouth daily       FLUoxetine (PROZAC) 20 MG capsule Take 1 capsule (20 mg) by mouth daily In addition to the 40 mg capsule for a total of 60 mg daily  "90 capsule 3     FLUoxetine (PROZAC) 40 MG capsule TAKE 1 CAPSULE (40 MG) BY MOUTH EVERY MORNING. 90 capsule 3     norgestrel-ethinyl estradiol (LOW-OGESTREL) 0.3-30 MG-MCG tablet Take 1 tablet by mouth daily 84 tablet 3     vitamin B-12 (CYANOCOBALAMIN) 2500 MCG sublingual tablet Take 1 tablet (2,500 mcg) by mouth daily       Vitamin D3 (CHOLECALCIFEROL) 25 mcg (1000 units) tablet Take by mouth daily          Allergies:     Allergies   Allergen Reactions     Lexapro      Increased anxiety       Nsaids Other (See Comments)     This patient has a history of a Ricardo-en-Y gastric bypass. AVOID NSAIDs and aspirin due to risk of gastric and/or G-J anastomotic ulcers. If Megan must be on short course of NSAIDs or aspirin, use enteric coated if possible and use PPI      Zoloft      NIGHT SWEATS AND RASH FROM SWEATING AND INCREASE IN WEIGHT     Escitalopram Anxiety     Increased anxiety        Family History: family history includes Cancer in her paternal grandfather and paternal grandmother; Diabetes in her father; Gynecology in her maternal grandmother, sister, and sister; Heart Disease in her maternal grandmother; Hypertension in her father and mother; Unknown/Adopted in her maternal grandfather.     Social History:  reports that she quit smoking about 5 years ago. Her smoking use included cigarettes. She has a 5.40 pack-year smoking history. She has never used smokeless tobacco. She reports previous alcohol use. She reports that she does not use drugs.    Review of Systems:  ROS: 10 point ROS neg other than the symptoms noted above in the HPI and past medical history.    Physical Exam  GENERAL APPEARANCE: healthy, alert, no distress.   SKIN: no suspicious lesions or rashes  NEURO: Normal strength and tone, mentation intact and speech normal  PSYCH:  mentation appears normal and affect normal. Not anxious.  RESPIRATORY: No increased work of breathing.    /78   Pulse 69   Ht 1.664 m (5' 5.5\")   Wt 81.6 kg (180 lb) "   [facility] 07/14/2022 (Approximate)   BMI 29.50 kg/m       right HAND / WRIST EXAM:    Skin intact. No abnormal skin discoloration, erythema or ecchymosis.   Volar carpal tunnel syndrome surgery scar.  Normal wear pattern, color and tone.  No observable or palpable masses of the fingers or palm or wrist.  No palpable triggering of fingers.   No observable or palpable cords or nodules of the fingers or palm.    There is no swelling in the hand, wrist, thumb  There is moderate tenderness in the carpometacarpal joint of the thumb.  There is no ecchymosis.  There is no erythema of the surrounding skin.  There is no maceration of the skin.  There is no deformity in the area.  Intact extensors. No extensor lag.      1st carpometacarpal grind: positive     Intact sensation light touch median, radial, ulnar nerves of the hand  Intact sensation to the radial and ulnar digital nerves of the fingers, as well as the finger tips.  Intact epl fpl fdp edc wrist flexion/extension biceps/triceps deltoid  Brisk capillary refill to all fingers.   Palpable radial pulse, 2+.      LET HAND / WRIST EXAM:    Skin intact. No abnormal skin discoloration, erythema or ecchymosis.   Normal wear pattern, color and tone.  No observable or palpable masses of the fingers or palm or wrist.  No palpable triggering of fingers.   No observable or palpable cords or nodules of the fingers or palm.    There is no swelling in the hand, wrist, thumb  There is moderate tenderness in the carpometacarpal joint of the thumb.  There is no ecchymosis.  There is no erythema of the surrounding skin.  There is no maceration of the skin.  There is no deformity in the area.  Intact extensors. No extensor lag.      1st carpometacarpal grind: positive     Intact sensation light touch median, radial, ulnar nerves of the hand  Intact sensation to the radial and ulnar digital nerves of the fingers, as well as the finger tips.  Intact epl fpl fdp edc wrist flexion/extension  biceps/triceps deltoid  Brisk capillary refill to all fingers.   Palpable radial pulse, 2+.      X-rays:  3 views bilateral thumbs from 8/19/2022 were reviewed in clinic today. On my review, moderate-severe bilateral thumb carpometacarpal osteoarthritis .    EMG done on 5/21/2018 bilateral wrists, Dr. Olea, [facility].  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    Assessment: 46yo RHD female with chronic bilateral thumb pain, carpometacarpal osteoarthritis..    Plan:  * reviewed exam findings and xrays with patient. Consistent with carpometacarpal basilar thumb arthritis  * treatment options discussed  * rest  * ice  * NSAIDS  * activity modification  * thumb based splint immobilization with activities (refer to Hand Therapy/OT for splint, strengthening, rom) - patient declines.  * cortisone injections, patient declines.  * lastly surgery (LRTI, anchovy procedure versus fusion)  * return to clinic as needed.            Moe Castro M.D., M.S.  Dept. of Orthopaedic Surgery  [facility]        Again, thank you for allowing me to participate in the care of your patient.        Sincerely,        Moe Castro MD

## 2022-10-20 DIAGNOSIS — N92.0 POLYMENORRHEA: ICD-10-CM

## 2022-10-21 RX ORDER — NORGESTREL AND ETHINYL ESTRADIOL 0.3-0.03MG
1 KIT ORAL DAILY
Qty: 84 TABLET | Refills: 0 | Status: SHIPPED | OUTPATIENT
Start: 2022-10-21 | End: 2022-10-26

## 2022-10-26 ENCOUNTER — OFFICE VISIT (OUTPATIENT)
Dept: FAMILY MEDICINE | Facility: CLINIC | Age: 47
End: 2022-10-26
Payer: COMMERCIAL

## 2022-10-26 VITALS
BODY MASS INDEX: 30.82 KG/M2 | SYSTOLIC BLOOD PRESSURE: 110 MMHG | WEIGHT: 185 LBS | HEART RATE: 60 BPM | DIASTOLIC BLOOD PRESSURE: 64 MMHG | RESPIRATION RATE: 16 BRPM | HEIGHT: 65 IN

## 2022-10-26 DIAGNOSIS — M25.50 MULTIPLE JOINT PAIN: ICD-10-CM

## 2022-10-26 DIAGNOSIS — N92.0 POLYMENORRHEA: ICD-10-CM

## 2022-10-26 DIAGNOSIS — F41.9 ANXIETY: ICD-10-CM

## 2022-10-26 DIAGNOSIS — Z01.419 ENCOUNTER FOR GYNECOLOGICAL EXAMINATION WITHOUT ABNORMAL FINDING: ICD-10-CM

## 2022-10-26 DIAGNOSIS — Z98.84 S/P GASTRIC BYPASS: ICD-10-CM

## 2022-10-26 DIAGNOSIS — Z00.00 ROUTINE GENERAL MEDICAL EXAMINATION AT A HEALTH CARE FACILITY: Primary | ICD-10-CM

## 2022-10-26 LAB
ALBUMIN SERPL BCG-MCNC: 4.2 G/DL (ref 3.5–5.2)
ALP SERPL-CCNC: 66 U/L (ref 35–104)
ALT SERPL W P-5'-P-CCNC: 17 U/L (ref 10–35)
ANION GAP SERPL CALCULATED.3IONS-SCNC: 10 MMOL/L (ref 7–15)
AST SERPL W P-5'-P-CCNC: 17 U/L (ref 10–35)
BILIRUB SERPL-MCNC: 0.3 MG/DL
BUN SERPL-MCNC: 10.7 MG/DL (ref 6–20)
CALCIUM SERPL-MCNC: 9.1 MG/DL (ref 8.6–10)
CHLORIDE SERPL-SCNC: 103 MMOL/L (ref 98–107)
CREAT SERPL-MCNC: 0.87 MG/DL (ref 0.51–0.95)
CRP SERPL-MCNC: 3.63 MG/L
DEPRECATED CALCIDIOL+CALCIFEROL SERPL-MC: 55 UG/L (ref 20–75)
DEPRECATED HCO3 PLAS-SCNC: 25 MMOL/L (ref 22–29)
ERYTHROCYTE [DISTWIDTH] IN BLOOD BY AUTOMATED COUNT: 12 % (ref 10–15)
ERYTHROCYTE [SEDIMENTATION RATE] IN BLOOD BY WESTERGREN METHOD: 12 MM/HR (ref 0–20)
FERRITIN SERPL-MCNC: 181 NG/ML (ref 6–175)
FOLATE SERPL-MCNC: >40 NG/ML (ref 4.6–34.8)
GFR SERPL CREATININE-BSD FRML MDRD: 82 ML/MIN/1.73M2
GLUCOSE SERPL-MCNC: 89 MG/DL (ref 70–99)
HCT VFR BLD AUTO: 39.2 % (ref 35–47)
HGB BLD-MCNC: 12.9 G/DL (ref 11.7–15.7)
IRON BINDING CAPACITY (ROCHE): 341 UG/DL (ref 240–430)
IRON SATN MFR SERPL: 39 % (ref 15–46)
IRON SERPL-MCNC: 132 UG/DL (ref 37–145)
MCH RBC QN AUTO: 31.2 PG (ref 26.5–33)
MCHC RBC AUTO-ENTMCNC: 32.9 G/DL (ref 31.5–36.5)
MCV RBC AUTO: 95 FL (ref 78–100)
PLATELET # BLD AUTO: 242 10E3/UL (ref 150–450)
POTASSIUM SERPL-SCNC: 4.2 MMOL/L (ref 3.4–5.3)
PROT SERPL-MCNC: 7.1 G/DL (ref 6.4–8.3)
RBC # BLD AUTO: 4.13 10E6/UL (ref 3.8–5.2)
SODIUM SERPL-SCNC: 138 MMOL/L (ref 136–145)
VIT B12 SERPL-MCNC: 1203 PG/ML (ref 232–1245)
WBC # BLD AUTO: 6.5 10E3/UL (ref 4–11)

## 2022-10-26 PROCEDURE — 99214 OFFICE O/P EST MOD 30 MIN: CPT | Mod: 25 | Performed by: NURSE PRACTITIONER

## 2022-10-26 PROCEDURE — 82306 VITAMIN D 25 HYDROXY: CPT | Performed by: NURSE PRACTITIONER

## 2022-10-26 PROCEDURE — 86200 CCP ANTIBODY: CPT | Performed by: NURSE PRACTITIONER

## 2022-10-26 PROCEDURE — 36415 COLL VENOUS BLD VENIPUNCTURE: CPT | Performed by: NURSE PRACTITIONER

## 2022-10-26 PROCEDURE — 85652 RBC SED RATE AUTOMATED: CPT | Performed by: NURSE PRACTITIONER

## 2022-10-26 PROCEDURE — 82728 ASSAY OF FERRITIN: CPT | Performed by: NURSE PRACTITIONER

## 2022-10-26 PROCEDURE — 83550 IRON BINDING TEST: CPT | Performed by: NURSE PRACTITIONER

## 2022-10-26 PROCEDURE — 83540 ASSAY OF IRON: CPT | Performed by: NURSE PRACTITIONER

## 2022-10-26 PROCEDURE — 82607 VITAMIN B-12: CPT | Performed by: NURSE PRACTITIONER

## 2022-10-26 PROCEDURE — 99396 PREV VISIT EST AGE 40-64: CPT | Performed by: NURSE PRACTITIONER

## 2022-10-26 PROCEDURE — 99000 SPECIMEN HANDLING OFFICE-LAB: CPT | Performed by: NURSE PRACTITIONER

## 2022-10-26 PROCEDURE — 86140 C-REACTIVE PROTEIN: CPT | Performed by: NURSE PRACTITIONER

## 2022-10-26 PROCEDURE — 82746 ASSAY OF FOLIC ACID SERUM: CPT | Performed by: NURSE PRACTITIONER

## 2022-10-26 PROCEDURE — 87624 HPV HI-RISK TYP POOLED RSLT: CPT | Performed by: NURSE PRACTITIONER

## 2022-10-26 PROCEDURE — 84425 ASSAY OF VITAMIN B-1: CPT | Mod: 90 | Performed by: NURSE PRACTITIONER

## 2022-10-26 PROCEDURE — 86038 ANTINUCLEAR ANTIBODIES: CPT | Performed by: NURSE PRACTITIONER

## 2022-10-26 PROCEDURE — 85027 COMPLETE CBC AUTOMATED: CPT | Performed by: NURSE PRACTITIONER

## 2022-10-26 PROCEDURE — G0145 SCR C/V CYTO,THINLAYER,RESCR: HCPCS | Performed by: NURSE PRACTITIONER

## 2022-10-26 PROCEDURE — 80053 COMPREHEN METABOLIC PANEL: CPT | Performed by: NURSE PRACTITIONER

## 2022-10-26 RX ORDER — ALPRAZOLAM 0.5 MG
0.5 TABLET ORAL 3 TIMES DAILY PRN
Qty: 10 TABLET | Refills: 0 | Status: SHIPPED | OUTPATIENT
Start: 2022-10-26 | End: 2023-12-13

## 2022-10-26 RX ORDER — NORGESTREL AND ETHINYL ESTRADIOL 0.3-0.03MG
1 KIT ORAL DAILY
Qty: 84 TABLET | Refills: 3 | Status: SHIPPED | OUTPATIENT
Start: 2022-10-26 | End: 2023-01-16

## 2022-10-26 ASSESSMENT — ENCOUNTER SYMPTOMS
FREQUENCY: 0
DYSURIA: 0
EYE PAIN: 0
BREAST MASS: 0
DIARRHEA: 0
SORE THROAT: 0
COUGH: 0
SHORTNESS OF BREATH: 0
HEMATURIA: 0
PARESTHESIAS: 0
HEMATOCHEZIA: 0
ARTHRALGIAS: 0
HEADACHES: 0
WEAKNESS: 0
MYALGIAS: 0
FEVER: 0
ABDOMINAL PAIN: 0
CONSTIPATION: 0
JOINT SWELLING: 0
NERVOUS/ANXIOUS: 1
NAUSEA: 0
CHILLS: 0
HEARTBURN: 0
PALPITATIONS: 0
DIZZINESS: 0

## 2022-10-26 NOTE — PROGRESS NOTES
SUBJECTIVE:   CC: Megan is an 47 year old who presents for preventive health visit.       Patient has been advised of split billing requirements and indicates understanding: Yes  Healthy Habits:     Getting at least 3 servings of Calcium per day:  Yes    Bi-annual eye exam:  NO    Dental care twice a year:  Yes    Sleep apnea or symptoms of sleep apnea:  None    Diet:  Regular (no restrictions)    Frequency of exercise:  2-3 days/week    Duration of exercise:  30-45 minutes    Taking medications regularly:  Yes    Medication side effects:  None    PHQ-2 Total Score: 0    Additional concerns today:  Yes    Bilateral Carpometacarpal Basilar Thumb arthritis- Saw Dr. Castro 2022. Pt unsure of injections.     Bilateral Hip Pain - doing home exercises. States no back pain. PT suggested Bursitis     Anxiety Follow-Up    How are you doing with your anxiety since your last visit? Better     Are you having other symptoms that might be associated with anxiety? Night sweats     Have you had a significant life event? No     Are you feeling depressed? No    Do you have any concerns with your use of alcohol or other drugs? No    Social History     Tobacco Use     Smoking status: Former     Packs/day: 0.30     Years: 18.00     Pack years: 5.40     Types: Cigarettes     Quit date: 2017     Years since quittin.3     Smokeless tobacco: Never   Vaping Use     Vaping Use: Never used   Substance Use Topics     Alcohol use: Not Currently     Comment: SOCIALLY     Drug use: No     RAND-7 SCORE 2016   Total Score - - -   Total Score 3 6 3     PHQ 2016   PHQ-9 Total Score 6 7 2   Q9: Thoughts of better off dead/self-harm past 2 weeks Not at all Not at all Not at all       Today's PHQ-2 Score:   PHQ-2 (  [employer]) 10/26/2022   Q1: Little interest or pleasure in doing things 0   Q2: Feeling down, depressed or hopeless 0   PHQ-2 Score 0   PHQ-2 Total Score (12-17 Years)-  Positive if 3 or more points; Administer PHQ-A if positive -   Q1: Little interest or pleasure in doing things Not at all   Q2: Feeling down, depressed or hopeless Not at all   PHQ-2 Score 0     Abuse: Current or Past (Physical, Sexual or Emotional) - No  Do you feel safe in your environment? Yes    Social History     Tobacco Use     Smoking status: Former     Packs/day: 0.30     Years: 18.00     Pack years: 5.40     Types: Cigarettes     Quit date: 2017     Years since quittin.3     Smokeless tobacco: Never   Substance Use Topics     Alcohol use: Not Currently     Comment: SOCIALLY       Alcohol Use 10/26/2022   Prescreen: >3 drinks/day or >7 drinks/week? Not Applicable   Prescreen: >3 drinks/day or >7 drinks/week? -       Reviewed orders with patient.  Reviewed health maintenance and updated orders accordingly - Yes  Labs reviewed in EPIC    Breast Cancer Screening:    FHS-7:   Breast CA Risk Assessment (FHS-7) 2021   Did any of your first-degree relatives have breast or ovarian cancer? No   Did any of your relatives have bilateral breast cancer? No   Did any man in your family have breast cancer? No   Did any woman in your family have breast and ovarian cancer? No   Did any woman in your family have breast cancer before age 50 y? No   Do you have 2 or more relatives with breast and/or ovarian cancer? No   Do you have 2 or more relatives with breast and/or bowel cancer? No     Mammogram Screening: Recommended annual mammography  Pertinent mammograms are reviewed under the imaging tab.    History of abnormal Pap smear: NO - age 30-65 PAP every 5 years with negative HPV co-testing recommended  PAP / HPV Latest Ref Rng & Units 2017 10/17/2014 6/15/2011   PAP (Historical) - NIL NIL NIL   HPV16 NEG Negative - -   HPV18 NEG Negative - -   HRHPV NEG Negative - -     Reviewed and updated as needed this visit by clinical staff   Tobacco  Allergies  Meds  Problems  Med Hx  Surg Hx  Fam Hx  Soc   Hx  "       Reviewed and updated as needed this visit by Provider   Tobacco  Allergies  Meds  Problems  Med Hx  Surg Hx  Fam Hx           Review of Systems   Constitutional: Negative for chills and fever.   HENT: Negative for congestion, ear pain, hearing loss and sore throat.    Eyes: Negative for pain and visual disturbance.   Respiratory: Negative for cough and shortness of breath.    Cardiovascular: Negative for chest pain, palpitations and peripheral edema.   Gastrointestinal: Negative for abdominal pain, constipation, diarrhea, heartburn, hematochezia and nausea.   Breasts:  Negative for tenderness, breast mass and discharge.   Genitourinary: Positive for vaginal bleeding. Negative for dysuria, frequency, genital sores, hematuria, pelvic pain, urgency and vaginal discharge.   Musculoskeletal: Negative for arthralgias, joint swelling and myalgias.   Skin: Negative for rash.   Neurological: Negative for dizziness, weakness, headaches and paresthesias.   Psychiatric/Behavioral: Negative for mood changes. The patient is nervous/anxious.       OBJECTIVE:   /64 (Cuff Size: Adult Large)   Pulse 60   Resp 16   Ht 1.657 m (5' 5.25\")   Wt 83.9 kg (185 lb)   LMP 10/10/2022 (Exact Date)   BMI 30.55 kg/m    Physical Exam  GENERAL: healthy, alert and no distress  EYES: Eyes grossly normal to inspection, PERRL and conjunctivae and sclerae normal  HENT: ear canals and TM's normal, nose and mouth without ulcers or lesions  NECK: no adenopathy, no asymmetry, masses, or scars and thyroid normal to palpation  RESP: lungs clear to auscultation - no rales, rhonchi or wheezes  BREAST: normal without masses, tenderness or nipple discharge and no palpable axillary masses or adenopathy  CV: regular rate and rhythm, normal S1 S2, no S3 or S4, no murmur, click or rub, no peripheral edema and peripheral pulses strong  ABDOMEN: soft, nontender, no hepatosplenomegaly, no masses and bowel sounds normal   (female): normal " female external genitalia, normal urethral meatus, vaginal mucosa pink, moist, well rugated, and normal cervix/adnexa/uterus without masses or discharge  MS: no gross musculoskeletal defects noted, no edema  SKIN: no suspicious lesions or rashes  NEURO: Normal strength and tone, mentation intact and speech normal  PSYCH: mentation appears normal, affect normal/bright    Diagnostic Test Results:  Results for orders placed or performed in visit on 10/26/22   ESR: Erythrocyte sedimentation rate     Status: Normal   Result Value Ref Range    Erythrocyte Sedimentation Rate 12 0 - 20 mm/hr   CBC with platelets     Status: Normal   Result Value Ref Range    WBC Count 6.5 4.0 - 11.0 10e3/uL    RBC Count 4.13 3.80 - 5.20 10e6/uL    Hemoglobin 12.9 11.7 - 15.7 g/dL    Hematocrit 39.2 35.0 - 47.0 %    MCV 95 78 - 100 fL    MCH 31.2 26.5 - 33.0 pg    MCHC 32.9 31.5 - 36.5 g/dL    RDW 12.0 10.0 - 15.0 %    Platelet Count 242 150 - 450 10e3/uL       ASSESSMENT/PLAN:   (Z00.00) Routine general medical examination at a health care facility  (primary encounter diagnosis)  Comment: no concerns other than below    (Z01.419) Encounter for gynecological examination without abnormal finding  Comment: updated today  Plan: Gynecologic Cytology (PAP)          (N92.0) Polymenorrhea  Comment: happy with current medication.   Plan: norgestrel-ethinyl estradiol (LOW-OGESTREL)         0.3-30 MG-MCG tablet          (F41.9) Anxiety  Comment: improved with the fluoxetine and as needed Xanax.   Plan: ALPRAZolam (XANAX) 0.5 MG tablet          (Z98.84) S/P gastric bypass  Comment: labs pending for further evaluation.   Plan: Vitamin B12, Vitamin B1 whole blood, Iron and         iron binding capacity, Ferritin, CBC with         platelets, Comprehensive metabolic panel (BMP +        Alb, Alk Phos, ALT, AST, Total. Bili, TP),         Folate, Vitamin D Deficiency          (M25.50) Multiple joint pain  Comment: will do labs for further evaluation of joint  "pain, Recommended follow up with Dr. Castro for consideration of injections as discussed at previous visit.   Plan: CRP, inflammation, ESR: Erythrocyte         sedimentation rate, Anti Nuclear Jonelle IgG by IFA        with Reflex, Cyclic Citrullinated Peptide         Antibody IgG             COUNSELING:  Reviewed preventive health counseling, as reflected in patient instructions    Estimated body mass index is 30.55 kg/m  as calculated from the following:    Height as of this encounter: 1.657 m (5' 5.25\").    Weight as of this encounter: 83.9 kg (185 lb).      She reports that she quit smoking about 5 years ago. Her smoking use included cigarettes. She has a 5.40 pack-year smoking history. She has never used smokeless tobacco.      Counseling Resources:  ATP IV Guidelines  Pooled Cohorts Equation Calculator  Breast Cancer Risk Calculator  BRCA-Related Cancer Risk Assessment: FHS-7 Tool  FRAX Risk Assessment  ICSI Preventive Guidelines  Dietary Guidelines for Americans, 2010  USDA's MyPlate  ASA Prophylaxis  Lung CA Screening    ANDREW Chappell [facility]  "

## 2022-10-27 LAB
ANA SER QL IF: NEGATIVE
CCP AB SER IA-ACNC: 1 U/ML

## 2022-10-28 LAB
BKR LAB AP GYN ADEQUACY: NORMAL
BKR LAB AP GYN INTERPRETATION: NORMAL
BKR LAB AP HPV REFLEX: NORMAL
BKR LAB AP PREVIOUS ABNORMAL: NORMAL
PATH REPORT.COMMENTS IMP SPEC: NORMAL
PATH REPORT.COMMENTS IMP SPEC: NORMAL
PATH REPORT.RELEVANT HX SPEC: NORMAL

## 2022-10-28 NOTE — PROGRESS NOTES
Chief Complaint:   Chief Complaint   Patient presents with     Thumb Discomfort     Bilateral thumb arthritis. Patient notes she did not want injections before because she was too nervous. She would like to try injections today. She thinks it will help. Pain with using her thumbs.         HPI: Megan Dowd is a 47 year old female , right -hand dominant, who presents for followup evaluation and management of a bilateral wrist pain, no known injury. Pain has been present for more than 6 months. Actually when we saw her in 2018 she was having thumb pain at that time we last saw her 8/24/2022, talked about carpometacarpal injections, but she was too nervous. Presents today thinking she'd like to do the injections.     She locates pain around the base of the thumbs and fat pad. Pain with using the hands. Left more painful than the right. Has pain at rest.    Using topical osteoarthritis cream, thinks helps a little.    History of right carpal tunnel release 6/21/2018, also has left carpal tunnel syndrome but no surgery on the left.      She reports having mod pain/discomfort around the wrist site. She denies associated numbness or tingling. She denies any  injuries to her upper extremity.   Symptoms: pain.  Location of symptoms: base of thumbs, left > right .  Pain severity: 7/10  Pain quality: dull, aching, throbbing and burning  Frequency of symptoms: are constant  Aggravating factors: activities/using the hands.  Relieving factors: rest. Topical ointments.    Previous treatment: topical ointments    Past medical history:  has a past medical history of Anxiety, Gastroesophageal reflux disease, History of diarrhea, IBS (irritable bowel syndrome), Insomnia, and Motion sickness.    She has no past medical history of Arthritis, Cancer (H), Cerebral infarction (H), Congestive heart failure (H), COPD (chronic obstructive pulmonary disease) (H), Depressive disorder, Diabetes (H), Heart disease, History of blood  transfusion, Hypertension, Thyroid disease, or Uncomplicated asthma.   Patient Active Problem List    Diagnosis Date Noted     S/P gastric bypass 07/22/2020     Priority: Medium     Other specified intestinal malabsorption 07/22/2020     Priority: Medium     Bee sting allergy 07/22/2020     Priority: Medium     Polymenorrhea 06/26/2013     Priority: Medium     Dysmenorrhea 06/26/2013     Priority: Medium     Ovarian cyst 06/26/2013     Priority: Medium     Family history of diabetes mellitus 06/15/2011     Priority: Medium     CARDIOVASCULAR SCREENING; LDL GOAL LESS THAN 160 10/31/2010     Priority: Medium     Insomnia      Priority: Medium     Anxiety      Priority: Medium     IBS (irritable bowel syndrome)      Priority: Medium     Past surgical history:  has a past surgical history that includes tubal ligation (4/07); surgical history of -  (3/13); Breast surgery (3/2013); Release carpal tunnel (Right, 6/21/2018); and Abdomen surgery (04/2019).     Medications:    Current Outpatient Medications   Medication Sig Dispense Refill     ALPRAZolam (XANAX) 0.5 MG tablet Take 1 tablet (0.5 mg) by mouth 3 times daily as needed for anxiety 10 tablet 0     Ascorbic Acid (VITAMIN C) 100 MG CHEW        childrens multivitamin chewable tablet Take 2 tablets by mouth daily       FLUoxetine (PROZAC) 20 MG capsule Take 1 capsule (20 mg) by mouth daily In addition to the 40 mg capsule for a total of 60 mg daily 90 capsule 3     FLUoxetine (PROZAC) 40 MG capsule TAKE 1 CAPSULE (40 MG) BY MOUTH EVERY MORNING. 90 capsule 3     norgestrel-ethinyl estradiol (LOW-OGESTREL) 0.3-30 MG-MCG tablet Take 1 tablet by mouth daily 84 tablet 3     vitamin B-12 (CYANOCOBALAMIN) 2500 MCG sublingual tablet Take 1 tablet (2,500 mcg) by mouth daily       Vitamin D3 (CHOLECALCIFEROL) 25 mcg (1000 units) tablet Take by mouth daily          Allergies:     Allergies   Allergen Reactions     Lexapro      Increased anxiety       Nsaids Other (See Comments)  "    This patient has a history of a Ricardo-en-Y gastric bypass. AVOID NSAIDs and aspirin due to risk of gastric and/or G-J anastomotic ulcers. If Megan must be on short course of NSAIDs or aspirin, use enteric coated if possible and use PPI      Zoloft      NIGHT SWEATS AND RASH FROM SWEATING AND INCREASE IN WEIGHT     Escitalopram Anxiety     Increased anxiety        Family History: family history includes Cancer in her paternal grandfather and paternal grandmother; Diabetes in her father; Gynecology in her maternal grandmother, sister, and sister; Heart Disease in her maternal grandmother; Hypertension in her father and mother; Unknown/Adopted in her maternal grandfather.     Social History:  reports that she quit smoking about 5 years ago. Her smoking use included cigarettes. She has a 5.40 pack-year smoking history. She has never used smokeless tobacco. She reports that she does not currently use alcohol. She reports that she does not use drugs.    Review of Systems:     Denies numbness, tingling, parasthesias.   Denies headaches.   Denies fevers, chills, night sweats   Denies chest pain.   Denies shortness of breath.   Denies any skin problems, abrasions, rashes, irritation.      Physical Exam  GENERAL APPEARANCE: healthy, alert, no distress.   SKIN: no suspicious lesions or rashes  NEURO: Normal strength and tone, mentation intact and speech normal  PSYCH:  mentation appears normal and affect normal. Not anxious.  RESPIRATORY: No increased work of breathing.    /79   Pulse 66   Ht 1.657 m (5' 5.25\")   Wt 82.6 kg (182 lb)   LMP 10/10/2022 (Exact Date)   BMI 30.05 kg/m       right HAND / WRIST EXAM:    Skin intact. No abnormal skin discoloration, erythema or ecchymosis.   Volar carpal tunnel syndrome surgery scar.  Normal wear pattern, color and tone.  There is no swelling in the hand, wrist, thumb  There is moderate tenderness in the carpometacarpal joint of the thumb.  There is no ecchymosis.  There " is no erythema of the surrounding skin.  There is no maceration of the skin.  There is no deformity in the area.  Intact extensors. No extensor lag.      1st carpometacarpal grind: positive     Intact sensation light touch median, radial, ulnar nerves of the hand  Intact sensation to the radial and ulnar digital nerves of the fingers, as well as the finger tips.  Intact epl fpl fdp edc wrist flexion/extension biceps/triceps deltoid  Brisk capillary refill to all fingers.   Palpable radial pulse, 2+.      LET HAND / WRIST EXAM:    Skin intact. No abnormal skin discoloration, erythema or ecchymosis.   Normal wear pattern, color and tone.    There is no swelling in the hand, wrist, thumb  There is moderate tenderness in the carpometacarpal joint of the thumb.  There is no ecchymosis.  There is no erythema of the surrounding skin.  There is no maceration of the skin.  There is no deformity in the area.  Intact extensors. No extensor lag.      1st carpometacarpal grind: positive     Intact sensation light touch median, radial, ulnar nerves of the hand  Intact sensation to the radial and ulnar digital nerves of the fingers, as well as the finger tips.  Intact epl fpl fdp edc wrist flexion/extension biceps/triceps deltoid  Brisk capillary refill to all fingers.   Palpable radial pulse, 2+.      X-rays: no new images today.  3 views bilateral thumbs from 8/19/2022 were again reviewed in clinic today. On my review, moderate-severe bilateral thumb carpometacarpal osteoarthritis .    EMG done on 5/21/2018 bilateral wrists, Dr. Olea, [facility].  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    Assessment: 46yo RHD female with chronic bilateral thumb pain, carpometacarpal osteoarthritis..    Plan:  * reviewed exam findings and xrays with patient once again today. Consistent with  carpometacarpal basilar thumb arthritis  * treatment options discussed  * rest  * ice  * NSAIDS  * activity modification  * thumb based splint immobilization with activities (refer to Hand Therapy/OT for splint, strengthening, rom) - patient declines.  * cortisone injections, patient declines.  * lastly surgery (LRTI, anchovy procedure versus fusion), refer to hand surgery.  * return to clinic as needed.      Moe Castro M.D., M.S.  Dept. of Orthopaedic Surgery  [facility]      Hand / Upper Extremity Injection/Arthrocentesis: bilateral thumb CMC    Date/Time: 10/31/2022 3:10 PM  Performed by: Kev Valdes PA  Authorized by: Moe Castro MD     Indications:  Pain  Needle Size:  25 G  Guidance: landmark    Approach:  Radial  Condition: osteoarthritis    Laterality:  Bilateral  Location:  Thumb  Site:  Bilateral thumb CMC    Medications (Right):  0.5 mL lidocaine 1 %; 20 mg triamcinolone 40 MG/ML  Medications (Left):  0.5 mL lidocaine 1 %; 20 mg triamcinolone 40 MG/ML  Outcome:  Tolerated well, no immediate complications  Procedure discussed: discussed risks, benefits, and alternatives    Consent Given by:  Patient  Prep: patient was prepped and draped in usual sterile fashion

## 2022-10-31 ENCOUNTER — OFFICE VISIT (OUTPATIENT)
Dept: ORTHOPEDICS | Facility: CLINIC | Age: 47
End: 2022-10-31
Payer: COMMERCIAL

## 2022-10-31 VITALS
HEART RATE: 66 BPM | DIASTOLIC BLOOD PRESSURE: 79 MMHG | WEIGHT: 182 LBS | SYSTOLIC BLOOD PRESSURE: 122 MMHG | HEIGHT: 65 IN | BODY MASS INDEX: 30.32 KG/M2

## 2022-10-31 DIAGNOSIS — M18.0 ARTHRITIS OF CARPOMETACARPAL (CMC) JOINT OF BOTH THUMBS: Primary | ICD-10-CM

## 2022-10-31 LAB
HUMAN PAPILLOMA VIRUS 16 DNA: NEGATIVE
HUMAN PAPILLOMA VIRUS 18 DNA: NEGATIVE
HUMAN PAPILLOMA VIRUS FINAL DIAGNOSIS: NORMAL
HUMAN PAPILLOMA VIRUS OTHER HR: NEGATIVE

## 2022-10-31 PROCEDURE — 20600 DRAIN/INJ JOINT/BURSA W/O US: CPT | Mod: 50 | Performed by: ORTHOPAEDIC SURGERY

## 2022-10-31 RX ORDER — TRIAMCINOLONE ACETONIDE 40 MG/ML
20 INJECTION, SUSPENSION INTRA-ARTICULAR; INTRAMUSCULAR
Status: SHIPPED | OUTPATIENT
Start: 2022-10-31

## 2022-10-31 RX ORDER — LIDOCAINE HYDROCHLORIDE 10 MG/ML
0.5 INJECTION, SOLUTION INFILTRATION; PERINEURAL
Status: SHIPPED | OUTPATIENT
Start: 2022-10-31

## 2022-10-31 RX ADMIN — LIDOCAINE HYDROCHLORIDE 0.5 ML: 10 INJECTION, SOLUTION INFILTRATION; PERINEURAL at 15:10

## 2022-10-31 RX ADMIN — TRIAMCINOLONE ACETONIDE 20 MG: 40 INJECTION, SUSPENSION INTRA-ARTICULAR; INTRAMUSCULAR at 15:10

## 2022-10-31 ASSESSMENT — PAIN SCALES - GENERAL: PAINLEVEL: SEVERE PAIN (7)

## 2022-10-31 NOTE — LETTER
10/31/2022         RE: Megan Dowd  [id number] [id number]        Dear Colleague,    Thank you for referring your patient, Megan Dowd, to the [facility]. Please see a copy of my visit note below.    Chief Complaint:   Chief Complaint   Patient presents with     Thumb Discomfort     Bilateral thumb arthritis. Patient notes she did not want injections before because she was too nervous. She would like to try injections today. She thinks it will help. Pain with using her thumbs.         HPI: Megan Dowd is a 47 year old female , right -hand dominant, who presents for followup evaluation and management of a bilateral wrist pain, no known injury. Pain has been present for more than 6 months. Actually when we saw her in 2018 she was having thumb pain at that time we last saw her 8/24/2022, talked about carpometacarpal injections, but she was too nervous. Presents today thinking she'd like to do the injections.     She locates pain around the base of the thumbs and fat pad. Pain with using the hands. Left more painful than the right. Has pain at rest.    Using topical osteoarthritis cream, thinks helps a little.    History of right carpal tunnel release 6/21/2018, also has left carpal tunnel syndrome but no surgery on the left.      She reports having mod pain/discomfort around the wrist site. She denies associated numbness or tingling. She denies any  injuries to her upper extremity.   Symptoms: pain.  Location of symptoms: base of thumbs, left > right .  Pain severity: 7/10  Pain quality: dull, aching, throbbing and burning  Frequency of symptoms: are constant  Aggravating factors: activities/using the hands.  Relieving factors: rest. Topical ointments.    Previous treatment: topical ointments    Past medical history:  has a past medical history of Anxiety, Gastroesophageal reflux disease, History of diarrhea, IBS (irritable bowel syndrome),  Insomnia, and Motion sickness.    She has no past medical history of Arthritis, Cancer (H), Cerebral infarction (H), Congestive heart failure (H), COPD (chronic obstructive pulmonary disease) (H), Depressive disorder, Diabetes (H), Heart disease, History of blood transfusion, Hypertension, Thyroid disease, or Uncomplicated asthma.   Patient Active Problem List    Diagnosis Date Noted     S/P gastric bypass 07/22/2020     Priority: Medium     Other specified intestinal malabsorption 07/22/2020     Priority: Medium     Bee sting allergy 07/22/2020     Priority: Medium     Polymenorrhea 06/26/2013     Priority: Medium     Dysmenorrhea 06/26/2013     Priority: Medium     Ovarian cyst 06/26/2013     Priority: Medium     Family history of diabetes mellitus 06/15/2011     Priority: Medium     CARDIOVASCULAR SCREENING; LDL GOAL LESS THAN 160 10/31/2010     Priority: Medium     Insomnia      Priority: Medium     Anxiety      Priority: Medium     IBS (irritable bowel syndrome)      Priority: Medium     Past surgical history:  has a past surgical history that includes tubal ligation (4/07); surgical history of -  (3/13); Breast surgery (3/2013); Release carpal tunnel (Right, 6/21/2018); and Abdomen surgery (04/2019).     Medications:    Current Outpatient Medications   Medication Sig Dispense Refill     ALPRAZolam (XANAX) 0.5 MG tablet Take 1 tablet (0.5 mg) by mouth 3 times daily as needed for anxiety 10 tablet 0     Ascorbic Acid (VITAMIN C) 100 MG CHEW        childrens multivitamin chewable tablet Take 2 tablets by mouth daily       FLUoxetine (PROZAC) 20 MG capsule Take 1 capsule (20 mg) by mouth daily In addition to the 40 mg capsule for a total of 60 mg daily 90 capsule 3     FLUoxetine (PROZAC) 40 MG capsule TAKE 1 CAPSULE (40 MG) BY MOUTH EVERY MORNING. 90 capsule 3     norgestrel-ethinyl estradiol (LOW-OGESTREL) 0.3-30 MG-MCG tablet Take 1 tablet by mouth daily 84 tablet 3     vitamin B-12 (CYANOCOBALAMIN) 2500 MCG  "sublingual tablet Take 1 tablet (2,500 mcg) by mouth daily       Vitamin D3 (CHOLECALCIFEROL) 25 mcg (1000 units) tablet Take by mouth daily          Allergies:     Allergies   Allergen Reactions     Lexapro      Increased anxiety       Nsaids Other (See Comments)     This patient has a history of a Ricardo-en-Y gastric bypass. AVOID NSAIDs and aspirin due to risk of gastric and/or G-J anastomotic ulcers. If Megan must be on short course of NSAIDs or aspirin, use enteric coated if possible and use PPI      Zoloft      NIGHT SWEATS AND RASH FROM SWEATING AND INCREASE IN WEIGHT     Escitalopram Anxiety     Increased anxiety        Family History: family history includes Cancer in her paternal grandfather and paternal grandmother; Diabetes in her father; Gynecology in her maternal grandmother, sister, and sister; Heart Disease in her maternal grandmother; Hypertension in her father and mother; Unknown/Adopted in her maternal grandfather.     Social History:  reports that she quit smoking about 5 years ago. Her smoking use included cigarettes. She has a 5.40 pack-year smoking history. She has never used smokeless tobacco. She reports that she does not currently use alcohol. She reports that she does not use drugs.    Review of Systems:     Denies numbness, tingling, parasthesias.   Denies headaches.   Denies fevers, chills, night sweats   Denies chest pain.   Denies shortness of breath.   Denies any skin problems, abrasions, rashes, irritation.      Physical Exam  GENERAL APPEARANCE: healthy, alert, no distress.   SKIN: no suspicious lesions or rashes  NEURO: Normal strength and tone, mentation intact and speech normal  PSYCH:  mentation appears normal and affect normal. Not anxious.  RESPIRATORY: No increased work of breathing.    /79   Pulse 66   Ht 1.657 m (5' 5.25\")   Wt 82.6 kg (182 lb)   LMP 10/10/2022 (Exact Date)   BMI 30.05 kg/m       right HAND / WRIST EXAM:    Skin intact. No abnormal skin " discoloration, erythema or ecchymosis.   Volar carpal tunnel syndrome surgery scar.  Normal wear pattern, color and tone.  There is no swelling in the hand, wrist, thumb  There is moderate tenderness in the carpometacarpal joint of the thumb.  There is no ecchymosis.  There is no erythema of the surrounding skin.  There is no maceration of the skin.  There is no deformity in the area.  Intact extensors. No extensor lag.      1st carpometacarpal grind: positive     Intact sensation light touch median, radial, ulnar nerves of the hand  Intact sensation to the radial and ulnar digital nerves of the fingers, as well as the finger tips.  Intact epl fpl fdp edc wrist flexion/extension biceps/triceps deltoid  Brisk capillary refill to all fingers.   Palpable radial pulse, 2+.      LET HAND / WRIST EXAM:    Skin intact. No abnormal skin discoloration, erythema or ecchymosis.   Normal wear pattern, color and tone.    There is no swelling in the hand, wrist, thumb  There is moderate tenderness in the carpometacarpal joint of the thumb.  There is no ecchymosis.  There is no erythema of the surrounding skin.  There is no maceration of the skin.  There is no deformity in the area.  Intact extensors. No extensor lag.      1st carpometacarpal grind: positive     Intact sensation light touch median, radial, ulnar nerves of the hand  Intact sensation to the radial and ulnar digital nerves of the fingers, as well as the finger tips.  Intact epl fpl fdp edc wrist flexion/extension biceps/triceps deltoid  Brisk capillary refill to all fingers.   Palpable radial pulse, 2+.      X-rays: no new images today.  3 views bilateral thumbs from 8/19/2022 were again reviewed in clinic today. On my review, moderate-severe bilateral thumb carpometacarpal osteoarthritis .    EMG done on 5/21/2018 bilateral wrists, Dr. Olea, [facility].  Interpretation: This is an abnormal study, demonstrating electrophysiologic evidence of bilateral  median neuropathy at the wrist, mild on the left and mild to moderate on the right.    Assessment: 48yo RHD female with chronic bilateral thumb pain, carpometacarpal osteoarthritis..    Plan:  * reviewed exam findings and xrays with patient once again today. Consistent with carpometacarpal basilar thumb arthritis  * treatment options discussed  * rest  * ice  * NSAIDS  * activity modification  * thumb based splint immobilization with activities (refer to Hand Therapy/OT for splint, strengthening, rom) - patient declines.  * cortisone injections, patient declines.  * lastly surgery (LRTI, anchovy procedure versus fusion), refer to hand surgery.  * return to clinic as needed.      Moe Castro M.D., M.S.  Dept. of Orthopaedic Surgery  [facility]      Hand / Upper Extremity Injection/Arthrocentesis: bilateral thumb CMC    Date/Time: 10/31/2022 3:10 PM  Performed by: Kev Valdes PA  Authorized by: Moe Castro MD     Indications:  Pain  Needle Size:  25 G  Guidance: landmark    Approach:  Radial  Condition: osteoarthritis    Laterality:  Bilateral  Location:  Thumb  Site:  Bilateral thumb CMC    Medications (Right):  0.5 mL lidocaine 1 %; 20 mg triamcinolone 40 MG/ML  Medications (Left):  0.5 mL lidocaine 1 %; 20 mg triamcinolone 40 MG/ML  Outcome:  Tolerated well, no immediate complications  Procedure discussed: discussed risks, benefits, and alternatives    Consent Given by:  Patient  Prep: patient was prepped and draped in usual sterile fashion              Again, thank you for allowing me to participate in the care of your patient.        Sincerely,        Moe Castro MD

## 2022-11-01 LAB — VIT B1 PYROPHOSHATE BLD-SCNC: 161 NMOL/L

## 2022-11-18 NOTE — PROGRESS NOTES
08/15/22 0600   Signing Clinician's Name / Credentials   Signing clinician's name / credentials Vikas Vance, PT, DPT   Session Number   Session Number 1   Authorization status united   Progress Note/Recertification   Recertification Due Date 10/10/22   Adult Goals   PT Ortho Eval Goals 1;2;3;4   Ortho Goal 1   Goal Identifier STG   Goal Description Patient to report house chore acitivites do not increase bilateral hip pain greater than 4/10 pain.   Target Date 09/12/22   Ortho Goal 2   Goal Identifier STG   Goal Description Patient to report at worst bilateral hip pain at night less than 5/10 pain.   Target Date 09/12/22   Ortho Goal 3   Goal Identifier LTG   Goal Description Patient to report ability to walk over a mile with less than or equal to 1/10 bilateral hip pain.   Target Date 10/10/22   Ortho Goal 4   Goal Identifier LTG   Goal Description Patient to report no bilateral hip pain with any house chores.   Target Date 10/10/22   Subjective Report   Subjective Report see eval   Objective Measures   Objective Measures Objective Measure 1   Objective Measure 1   Objective Measure bilateral hip abduction strength   Details 3-/5   Treatment Interventions   Interventions Therapeutic Procedure/Exercise;Manual Therapy   Therapeutic Procedure/exercise   Therapeutic Procedures: strength, endurance, ROM, flexibillity minutes (86131) 15   Skilled Intervention HEP creation and completion for flexibility/strength   Patient Response hossein well   Treatment Detail supine/seated hip ER stretch, clams, rev clams, standing hip abduction, SLR   Manual Therapy   Manual Therapy: Mobilization, MFR, MLD, friction massage minutes (06300) 10   Skilled Intervention STM/MFR   Patient Response reduced sensitivity   Treatment Detail STM/MFR to surrounding greater trochanter muscles bilaterally at TFL, gluteals   Assessments Completed   Assessments Completed Discharging at this time due to unknown staus and no f/u appointments  scheduled.   Education   Learner Patient   Readiness Eager   Method Booklet/handout   Response Verbalizes Understanding   Plan   Homework ptrx   Home program see snapshot   Plan for next session progress hip strengthening   Total Session Time   Timed Code Treatment Minutes 25   Total Treatment Time (sum of timed and untimed services) 45   Medicare Claim Information   Medical Diagnosis Bilateral hip pain   PT Diagnosis bilateral hip pain   Start of Care Date 08/15/22   Onset date of current episode/exacerbation 08/15/21

## 2023-01-13 DIAGNOSIS — N92.0 POLYMENORRHEA: ICD-10-CM

## 2023-01-16 ENCOUNTER — HOSPITAL ENCOUNTER (OUTPATIENT)
Dept: MAMMOGRAPHY | Facility: CLINIC | Age: 48
Discharge: HOME | End: 2023-01-16
Attending: NURSE PRACTITIONER | Admitting: NURSE PRACTITIONER
Payer: COMMERCIAL

## 2023-01-16 DIAGNOSIS — Z12.31 VISIT FOR SCREENING MAMMOGRAM: ICD-10-CM

## 2023-01-16 PROCEDURE — 77063 BREAST TOMOSYNTHESIS BI: CPT

## 2023-01-16 RX ORDER — NORGESTREL AND ETHINYL ESTRADIOL 0.3-0.03MG
KIT ORAL
Qty: 84 TABLET | Refills: 3 | Status: SHIPPED | OUTPATIENT
Start: 2023-01-16 | End: 2023-12-13

## 2023-02-27 ENCOUNTER — MYC MEDICAL ADVICE (OUTPATIENT)
Dept: FAMILY MEDICINE | Facility: CLINIC | Age: 48
End: 2023-02-27

## 2023-02-27 DIAGNOSIS — N95.1 MENOPAUSAL SYNDROME (HOT FLASHES): ICD-10-CM

## 2023-02-27 DIAGNOSIS — F41.9 ANXIETY: ICD-10-CM

## 2023-04-23 ENCOUNTER — HEALTH MAINTENANCE LETTER (OUTPATIENT)
Age: 48
End: 2023-04-23

## 2023-04-24 ENCOUNTER — MYC MEDICAL ADVICE (OUTPATIENT)
Dept: FAMILY MEDICINE | Facility: CLINIC | Age: 48
End: 2023-04-24
Payer: COMMERCIAL

## 2023-04-24 DIAGNOSIS — N95.1 MENOPAUSAL SYNDROME (HOT FLASHES): ICD-10-CM

## 2023-04-24 DIAGNOSIS — F41.9 ANXIETY: ICD-10-CM

## 2023-04-24 RX ORDER — FLUOXETINE 40 MG/1
CAPSULE ORAL
Qty: 90 CAPSULE | Refills: 0 | Status: SHIPPED | OUTPATIENT
Start: 2023-04-24 | End: 2023-07-05

## 2023-07-05 DIAGNOSIS — N95.1 MENOPAUSAL SYNDROME (HOT FLASHES): ICD-10-CM

## 2023-07-05 DIAGNOSIS — F41.9 ANXIETY: ICD-10-CM

## 2023-07-05 RX ORDER — FLUOXETINE 40 MG/1
CAPSULE ORAL
Qty: 90 CAPSULE | Refills: 0 | Status: SHIPPED | OUTPATIENT
Start: 2023-07-05 | End: 2023-10-02

## 2023-10-02 DIAGNOSIS — N95.1 MENOPAUSAL SYNDROME (HOT FLASHES): ICD-10-CM

## 2023-10-02 DIAGNOSIS — F41.9 ANXIETY: ICD-10-CM

## 2023-10-02 RX ORDER — FLUOXETINE 40 MG/1
CAPSULE ORAL
Qty: 30 CAPSULE | Refills: 0 | Status: SHIPPED | OUTPATIENT
Start: 2023-10-02 | End: 2023-10-31

## 2023-10-31 DIAGNOSIS — F41.9 ANXIETY: ICD-10-CM

## 2023-10-31 DIAGNOSIS — N95.1 MENOPAUSAL SYNDROME (HOT FLASHES): ICD-10-CM

## 2023-10-31 RX ORDER — FLUOXETINE 40 MG/1
CAPSULE ORAL
Qty: 30 CAPSULE | Refills: 0 | Status: SHIPPED | OUTPATIENT
Start: 2023-10-31 | End: 2023-12-06

## 2023-11-14 DIAGNOSIS — F41.9 ANXIETY: ICD-10-CM

## 2023-11-14 DIAGNOSIS — N95.1 MENOPAUSAL SYNDROME (HOT FLASHES): ICD-10-CM

## 2023-11-14 RX ORDER — FLUOXETINE 40 MG/1
CAPSULE ORAL
Qty: 90 CAPSULE | Refills: 1 | OUTPATIENT
Start: 2023-11-14

## 2023-12-03 ENCOUNTER — HEALTH MAINTENANCE LETTER (OUTPATIENT)
Age: 48
End: 2023-12-03

## 2023-12-06 DIAGNOSIS — F41.9 ANXIETY: ICD-10-CM

## 2023-12-06 DIAGNOSIS — N95.1 MENOPAUSAL SYNDROME (HOT FLASHES): ICD-10-CM

## 2023-12-06 RX ORDER — FLUOXETINE 40 MG/1
CAPSULE ORAL
Qty: 90 CAPSULE | Refills: 0 | Status: SHIPPED | OUTPATIENT
Start: 2023-12-06 | End: 2023-12-13

## 2023-12-11 ASSESSMENT — ENCOUNTER SYMPTOMS
CONSTIPATION: 0
BREAST MASS: 0
SHORTNESS OF BREATH: 0
NAUSEA: 0
ARTHRALGIAS: 1
WEAKNESS: 0
DIARRHEA: 0
MYALGIAS: 0
EYE PAIN: 0
CHILLS: 0
COUGH: 0
HEADACHES: 0
HEMATURIA: 0
HEMATOCHEZIA: 0
FEVER: 0
DYSURIA: 0
ABDOMINAL PAIN: 0
DIZZINESS: 0
HEARTBURN: 0
PARESTHESIAS: 0
SORE THROAT: 0
JOINT SWELLING: 1
FREQUENCY: 0
PALPITATIONS: 0
NERVOUS/ANXIOUS: 1

## 2023-12-13 ENCOUNTER — OFFICE VISIT (OUTPATIENT)
Dept: FAMILY MEDICINE | Facility: CLINIC | Age: 48
End: 2023-12-13
Payer: COMMERCIAL

## 2023-12-13 VITALS
OXYGEN SATURATION: 99 % | WEIGHT: 191.8 LBS | DIASTOLIC BLOOD PRESSURE: 74 MMHG | RESPIRATION RATE: 20 BRPM | TEMPERATURE: 97.9 F | BODY MASS INDEX: 30.82 KG/M2 | SYSTOLIC BLOOD PRESSURE: 112 MMHG | HEART RATE: 64 BPM | HEIGHT: 66 IN

## 2023-12-13 DIAGNOSIS — Z13.6 CARDIOVASCULAR SCREENING; LDL GOAL LESS THAN 160: ICD-10-CM

## 2023-12-13 DIAGNOSIS — F41.9 ANXIETY: ICD-10-CM

## 2023-12-13 DIAGNOSIS — Z00.00 ROUTINE GENERAL MEDICAL EXAMINATION AT A HEALTH CARE FACILITY: Primary | ICD-10-CM

## 2023-12-13 DIAGNOSIS — N95.1 MENOPAUSAL SYNDROME (HOT FLASHES): ICD-10-CM

## 2023-12-13 DIAGNOSIS — Z98.84 S/P GASTRIC BYPASS: ICD-10-CM

## 2023-12-13 LAB
ALBUMIN SERPL BCG-MCNC: 4.6 G/DL (ref 3.5–5.2)
ALP SERPL-CCNC: 69 U/L (ref 40–150)
ALT SERPL W P-5'-P-CCNC: 16 U/L (ref 0–50)
ANION GAP SERPL CALCULATED.3IONS-SCNC: 11 MMOL/L (ref 7–15)
AST SERPL W P-5'-P-CCNC: 21 U/L (ref 0–45)
BILIRUB SERPL-MCNC: 0.5 MG/DL
BUN SERPL-MCNC: 11.2 MG/DL (ref 6–20)
CALCIUM SERPL-MCNC: 9.4 MG/DL (ref 8.6–10)
CHLORIDE SERPL-SCNC: 103 MMOL/L (ref 98–107)
CHOLEST SERPL-MCNC: 162 MG/DL
CREAT SERPL-MCNC: 0.92 MG/DL (ref 0.51–0.95)
DEPRECATED HCO3 PLAS-SCNC: 23 MMOL/L (ref 22–29)
EGFRCR SERPLBLD CKD-EPI 2021: 76 ML/MIN/1.73M2
ERYTHROCYTE [DISTWIDTH] IN BLOOD BY AUTOMATED COUNT: 12.4 % (ref 10–15)
FASTING STATUS PATIENT QL REPORTED: YES
FERRITIN SERPL-MCNC: 244 NG/ML (ref 6–175)
FOLATE SERPL-MCNC: >40 NG/ML (ref 4.6–34.8)
FSH SERPL IRP2-ACNC: 10.9 MIU/ML
GLUCOSE SERPL-MCNC: 89 MG/DL (ref 70–99)
HCT VFR BLD AUTO: 40.1 % (ref 35–47)
HDLC SERPL-MCNC: 65 MG/DL
HGB BLD-MCNC: 13.3 G/DL (ref 11.7–15.7)
IRON BINDING CAPACITY (ROCHE): 389 UG/DL (ref 240–430)
IRON SATN MFR SERPL: 38 % (ref 15–46)
IRON SERPL-MCNC: 148 UG/DL (ref 37–145)
LDLC SERPL CALC-MCNC: 86 MG/DL
MCH RBC QN AUTO: 31.3 PG (ref 26.5–33)
MCHC RBC AUTO-ENTMCNC: 33.2 G/DL (ref 31.5–36.5)
MCV RBC AUTO: 94 FL (ref 78–100)
NONHDLC SERPL-MCNC: 97 MG/DL
PLATELET # BLD AUTO: 286 10E3/UL (ref 150–450)
POTASSIUM SERPL-SCNC: 4.4 MMOL/L (ref 3.4–5.3)
PROT SERPL-MCNC: 7.5 G/DL (ref 6.4–8.3)
RBC # BLD AUTO: 4.25 10E6/UL (ref 3.8–5.2)
SODIUM SERPL-SCNC: 137 MMOL/L (ref 135–145)
T4 FREE SERPL-MCNC: 1.43 NG/DL (ref 0.9–1.7)
TRIGL SERPL-MCNC: 55 MG/DL
TSH SERPL DL<=0.005 MIU/L-ACNC: 2.64 UIU/ML (ref 0.3–4.2)
VIT B12 SERPL-MCNC: >4000 PG/ML (ref 232–1245)
VIT D+METAB SERPL-MCNC: 64 NG/ML (ref 20–50)
WBC # BLD AUTO: 7.5 10E3/UL (ref 4–11)

## 2023-12-13 PROCEDURE — 99000 SPECIMEN HANDLING OFFICE-LAB: CPT | Performed by: NURSE PRACTITIONER

## 2023-12-13 PROCEDURE — 82607 VITAMIN B-12: CPT | Performed by: NURSE PRACTITIONER

## 2023-12-13 PROCEDURE — 84425 ASSAY OF VITAMIN B-1: CPT | Mod: 90 | Performed by: NURSE PRACTITIONER

## 2023-12-13 PROCEDURE — 83550 IRON BINDING TEST: CPT | Performed by: NURSE PRACTITIONER

## 2023-12-13 PROCEDURE — 84443 ASSAY THYROID STIM HORMONE: CPT | Performed by: NURSE PRACTITIONER

## 2023-12-13 PROCEDURE — 83001 ASSAY OF GONADOTROPIN (FSH): CPT | Performed by: NURSE PRACTITIONER

## 2023-12-13 PROCEDURE — 80053 COMPREHEN METABOLIC PANEL: CPT | Performed by: NURSE PRACTITIONER

## 2023-12-13 PROCEDURE — 99396 PREV VISIT EST AGE 40-64: CPT | Performed by: NURSE PRACTITIONER

## 2023-12-13 PROCEDURE — 82728 ASSAY OF FERRITIN: CPT | Performed by: NURSE PRACTITIONER

## 2023-12-13 PROCEDURE — 82306 VITAMIN D 25 HYDROXY: CPT | Performed by: NURSE PRACTITIONER

## 2023-12-13 PROCEDURE — 83540 ASSAY OF IRON: CPT | Performed by: NURSE PRACTITIONER

## 2023-12-13 PROCEDURE — 82746 ASSAY OF FOLIC ACID SERUM: CPT | Performed by: NURSE PRACTITIONER

## 2023-12-13 PROCEDURE — 84439 ASSAY OF FREE THYROXINE: CPT | Performed by: NURSE PRACTITIONER

## 2023-12-13 PROCEDURE — 36415 COLL VENOUS BLD VENIPUNCTURE: CPT | Performed by: NURSE PRACTITIONER

## 2023-12-13 PROCEDURE — 99214 OFFICE O/P EST MOD 30 MIN: CPT | Mod: 25 | Performed by: NURSE PRACTITIONER

## 2023-12-13 PROCEDURE — 85027 COMPLETE CBC AUTOMATED: CPT | Performed by: NURSE PRACTITIONER

## 2023-12-13 PROCEDURE — 80061 LIPID PANEL: CPT | Performed by: NURSE PRACTITIONER

## 2023-12-13 RX ORDER — FLUOXETINE 40 MG/1
40 CAPSULE ORAL DAILY
Qty: 90 CAPSULE | Refills: 3 | Status: SHIPPED | OUTPATIENT
Start: 2023-12-13

## 2023-12-13 RX ORDER — ESTRADIOL 0.04 MG/D
1 PATCH, EXTENDED RELEASE TRANSDERMAL
COMMUNITY
Start: 2023-11-21

## 2023-12-13 RX ORDER — ALPRAZOLAM 0.5 MG
0.5 TABLET ORAL 3 TIMES DAILY PRN
Qty: 10 TABLET | Refills: 0 | Status: SHIPPED | OUTPATIENT
Start: 2023-12-13

## 2023-12-13 RX ORDER — PROGESTERONE 100 MG/1
200 CAPSULE ORAL DAILY
COMMUNITY
Start: 2023-11-16

## 2023-12-13 ASSESSMENT — ENCOUNTER SYMPTOMS
FEVER: 0
FREQUENCY: 0
CONSTIPATION: 0
CHILLS: 0
ABDOMINAL PAIN: 0
BREAST MASS: 0
NERVOUS/ANXIOUS: 1
HEMATURIA: 0
EYE PAIN: 0
NAUSEA: 0
PARESTHESIAS: 0
ARTHRALGIAS: 1
SHORTNESS OF BREATH: 0
PALPITATIONS: 0
DIZZINESS: 0
MYALGIAS: 0
DYSURIA: 0
HEARTBURN: 0
HEMATOCHEZIA: 0
HEADACHES: 0
DIARRHEA: 0
WEAKNESS: 0
JOINT SWELLING: 1
SORE THROAT: 0
COUGH: 0

## 2023-12-13 ASSESSMENT — ANXIETY QUESTIONNAIRES
6. BECOMING EASILY ANNOYED OR IRRITABLE: SEVERAL DAYS
1. FEELING NERVOUS, ANXIOUS, OR ON EDGE: MORE THAN HALF THE DAYS
IF YOU CHECKED OFF ANY PROBLEMS ON THIS QUESTIONNAIRE, HOW DIFFICULT HAVE THESE PROBLEMS MADE IT FOR YOU TO DO YOUR WORK, TAKE CARE OF THINGS AT HOME, OR GET ALONG WITH OTHER PEOPLE: SOMEWHAT DIFFICULT
GAD7 TOTAL SCORE: 7
7. FEELING AFRAID AS IF SOMETHING AWFUL MIGHT HAPPEN: NOT AT ALL
2. NOT BEING ABLE TO STOP OR CONTROL WORRYING: NOT AT ALL
3. WORRYING TOO MUCH ABOUT DIFFERENT THINGS: SEVERAL DAYS
5. BEING SO RESTLESS THAT IT IS HARD TO SIT STILL: MORE THAN HALF THE DAYS
GAD7 TOTAL SCORE: 7

## 2023-12-13 ASSESSMENT — PATIENT HEALTH QUESTIONNAIRE - PHQ9
SUM OF ALL RESPONSES TO PHQ QUESTIONS 1-9: 6
5. POOR APPETITE OR OVEREATING: SEVERAL DAYS

## 2023-12-13 ASSESSMENT — PAIN SCALES - GENERAL: PAINLEVEL: NO PAIN (0)

## 2023-12-13 NOTE — PROGRESS NOTES
"   SUBJECTIVE:   Megan is a 48 year old, presenting for the following:  Physical        2023     7:49 AM   Additional Questions   Roomed by Janna NATION CMA       Healthy Habits:     Getting at least 3 servings of Calcium per day:  Yes    Bi-annual eye exam:  Yes    Dental care twice a year:  Yes    Sleep apnea or symptoms of sleep apnea:  None    Diet:  Regular (no restrictions)    Frequency of exercise:  2-3 days/week    Duration of exercise:  30-45 minutes    Taking medications regularly:  Yes    Medication side effects:  None    Additional concerns today:  No      Today's PHQ-2 Score:       2023     7:39 AM   PHQ-2 (  [employer])   Q1: Little interest or pleasure in doing things 0   Q2: Feeling down, depressed or hopeless 0   PHQ-2 Score 0   Q1: Little interest or pleasure in doing things Not at all   Q2: Feeling down, depressed or hopeless Not at all   PHQ-2 Score 0       Anxiety Follow-Up  How are you doing with your anxiety since your last visit? Worsened, patient thinks menopause is making it worse.   Are you having other symptoms that might be associated with anxiety? Yes:  rapid heart rate, feels kind of \"hyped\" up, overwhelming feeling of how can she fix something  Have you had a significant life event? No   Are you feeling depressed? No  Do you have any concerns with your use of alcohol or other drugs? No    Social History     Tobacco Use    Smoking status: Former     Packs/day: 0.30     Years: 18.00     Additional pack years: 0.00     Total pack years: 5.40     Types: Cigarettes     Quit date: 2017     Years since quittin.4    Smokeless tobacco: Never   Vaping Use    Vaping Use: Never used   Substance Use Topics    Alcohol use: Not Currently     Comment: SOCIALLY    Drug use: No         2016     3:17 PM 2017     9:52 AM 2022    10:18 AM   RAND-7 SCORE   Total Score 3 6 3         2016     3:17 PM 2017     9:52 AM 2022    10:18 AM   PHQ   PHQ-9 Total Score 6 7 " 2   Q9: Thoughts of better off dead/self-harm past 2 weeks Not at all Not at all Not at all         2022    10:18 AM   Last PHQ-9   1.  Little interest or pleasure in doing things 0   2.  Feeling down, depressed, or hopeless 0   3.  Trouble falling or staying asleep, or sleeping too much 1   4.  Feeling tired or having little energy 0   5.  Poor appetite or overeating 1   6.  Feeling bad about yourself 0   7.  Trouble concentrating 0   8.  Moving slowly or restless 0   Q9: Thoughts of better off dead/self-harm past 2 weeks 0   PHQ-9 Total Score 2         2022    10:18 AM   RAND-7    1. Feeling nervous, anxious, or on edge 1   2. Not being able to stop or control worrying 0   3. Worrying too much about different things 0   4. Trouble relaxing 1   5. Being so restless that it is hard to sit still 0   6. Becoming easily annoyed or irritable 1   7. Feeling afraid, as if something awful might happen 0   RAND-7 Total Score 3         Social History     Tobacco Use    Smoking status: Former     Packs/day: 0.30     Years: 18.00     Additional pack years: 0.00     Total pack years: 5.40     Types: Cigarettes     Quit date: 2017     Years since quittin.4    Smokeless tobacco: Never   Substance Use Topics    Alcohol use: Not Currently     Comment: SOCIALLY           2023     2:51 PM   Alcohol Use   Prescreen: >3 drinks/day or >7 drinks/week? Not Applicable     Reviewed orders with patient.  Reviewed health maintenance and updated orders accordingly - Yes  Labs reviewed in EPIC    Breast Cancer Screening:    FHS-7:       2021     2:33 PM   Breast CA Risk Assessment (FHS-7)   Did any of your first-degree relatives have breast or ovarian cancer? No   Did any of your relatives have bilateral breast cancer? No   Did any man in your family have breast cancer? No   Did any woman in your family have breast and ovarian cancer? No   Did any woman in your family have breast cancer before age 50 y? No   Do you  have 2 or more relatives with breast and/or ovarian cancer? No   Do you have 2 or more relatives with breast and/or bowel cancer? No     Pertinent mammograms are reviewed under the imaging tab.    History of abnormal Pap smear: NO - age 30-65 PAP every 5 years with negative HPV co-testing recommended      Latest Ref Rng & Units 10/26/2022     8:05 AM 5/30/2017    10:10 AM 5/30/2017    10:06 AM   PAP / HPV   PAP  Negative for Intraepithelial Lesion or Malignancy (NILM)      PAP (Historical)    NIL    HPV 16 DNA Negative Negative  Negative     HPV 18 DNA Negative Negative  Negative     Other HR HPV Negative Negative  Negative       Reviewed and updated as needed this visit by clinical staff   Tobacco  Allergies  Meds              Reviewed and updated as needed this visit by Provider                     Review of Systems   Constitutional:  Negative for chills and fever.   HENT:  Negative for congestion, ear pain, hearing loss and sore throat.    Eyes:  Positive for visual disturbance. Negative for pain.   Respiratory:  Negative for cough and shortness of breath.    Cardiovascular:  Positive for peripheral edema. Negative for chest pain and palpitations.   Gastrointestinal:  Negative for abdominal pain, constipation, diarrhea, heartburn, hematochezia and nausea.   Breasts:  Negative for tenderness, breast mass and discharge.   Genitourinary:  Positive for urgency. Negative for dysuria, frequency, genital sores, hematuria, pelvic pain, vaginal bleeding and vaginal discharge.   Musculoskeletal:  Positive for arthralgias and joint swelling. Negative for myalgias.   Skin:  Negative for rash.   Neurological:  Negative for dizziness, weakness, headaches and paresthesias.   Psychiatric/Behavioral:  Positive for mood changes. The patient is nervous/anxious.       OBJECTIVE:   /74 (BP Location: Right arm, Patient Position: Sitting, Cuff Size: Adult Large)   Pulse 64   Temp 97.9  F (36.6  C) (Tympanic)   Resp 20   Ht  "1.676 m (5' 6\")   Wt 87 kg (191 lb 12.8 oz)   LMP  (LMP Unknown)   SpO2 99%   BMI 30.96 kg/m    Physical Exam  GENERAL: healthy, alert and no distress  EYES: Eyes grossly normal to inspection, PERRL and conjunctivae and sclerae normal  HENT: ear canals and TM's normal, nose and mouth without ulcers or lesions  NECK: no adenopathy, no asymmetry, masses, or scars and thyroid normal to palpation  RESP: lungs clear to auscultation - no rales, rhonchi or wheezes  BREAST: normal without masses, tenderness or nipple discharge and no palpable axillary masses or adenopathy, bilateral implants present  CV: regular rate and rhythm, normal S1 S2, no S3 or S4, no murmur, click or rub, no peripheral edema and peripheral pulses strong  ABDOMEN: soft, nontender, no hepatosplenomegaly, no masses and bowel sounds normal  MS: no gross musculoskeletal defects noted, no edema  SKIN: no suspicious lesions or rashes  NEURO: Normal strength and tone, mentation intact and speech normal  PSYCH: mentation appears normal, affect normal/bright    Diagnostic Test Results:  Results for orders placed or performed in visit on 12/13/23   CBC with platelets     Status: Normal   Result Value Ref Range    WBC Count 7.5 4.0 - 11.0 10e3/uL    RBC Count 4.25 3.80 - 5.20 10e6/uL    Hemoglobin 13.3 11.7 - 15.7 g/dL    Hematocrit 40.1 35.0 - 47.0 %    MCV 94 78 - 100 fL    MCH 31.3 26.5 - 33.0 pg    MCHC 33.2 31.5 - 36.5 g/dL    RDW 12.4 10.0 - 15.0 %    Platelet Count 286 150 - 450 10e3/uL       ASSESSMENT/PLAN:   (Z00.00) Routine general medical examination at a health care facility  (primary encounter diagnosis)    (F41.9) Anxiety  Comment: worsened with perimenopausal symptoms. Working with midwife on this, will follow up if symptoms do not improve with interventions. Declined medication changes today.   Plan: ALPRAZolam (XANAX) 0.5 MG tablet, FLUoxetine         (PROZAC) 20 MG capsule, FLUoxetine (PROZAC) 40         MG capsule, TSH, T4, free        "   (N95.1) Menopausal syndrome (hot flashes)  Comment: per above.   Plan: FLUoxetine (PROZAC) 20 MG capsule, FLUoxetine         (PROZAC) 40 MG capsule, Follicle stimulating         hormone          (Z98.84) S/P gastric bypass  Comment: labs pending for monitoring  Plan: Vitamin D Deficiency, Vitamin B12, Iron and         iron binding capacity, Ferritin, CBC with         platelets, Comprehensive metabolic panel (BMP +        Alb, Alk Phos, ALT, AST, Total. Bili, TP),         Folate, Vitamin B1 whole blood          (Z13.6) CARDIOVASCULAR SCREENING; LDL GOAL LESS THAN 160  Comment:   Plan: Lipid panel reflex to direct LDL Fasting            COUNSELING:  Reviewed preventive health counseling, as reflected in patient instructions        She reports that she quit smoking about 6 years ago. Her smoking use included cigarettes. She has a 5.40 pack-year smoking history. She has never used smokeless tobacco.        ANDREW Chappell CNP  [facility]

## 2023-12-17 LAB — VIT B1 PYROPHOSHATE BLD-SCNC: 167 NMOL/L

## 2023-12-19 DIAGNOSIS — K90.89 OTHER SPECIFIED INTESTINAL MALABSORPTION: Primary | ICD-10-CM

## 2023-12-28 RX ORDER — NORGESTREL AND ETHINYL ESTRADIOL 0.3-0.03MG
1 KIT ORAL DAILY
Qty: 84 TABLET | Refills: 1 | OUTPATIENT
Start: 2023-12-28

## 2023-12-28 NOTE — TELEPHONE ENCOUNTER
Requested Prescriptions   Pending Prescriptions Disp Refills    LOW-OGESTREL 0.3-30 MG-MCG tablet [Pharmacy Med Name: LOW-OGESTREL-28 TABLET] 84 tablet 1     Sig: TAKE 1 TABLET BY MOUTH EVERY DAY       Contraceptives Protocol Failed - 12/28/2023  9:47 AM        Failed - Medication is active on med list        Passed - Patient is not a current smoker if age is 35 or older        Passed - Recent (12 mo) or future (30 days) visit within the authorizing provider's specialty     The patient must have completed an in-person or virtual visit within the past 12 months or has a future visit scheduled within the next 90 days with the authorizing provider s specialty.  Urgent care and e-visits do not quality as an office visit for this protocol.          Passed - No active pregnancy on record        Passed - No positive pregnancy test in past 12 months          Date of last OV 12/13/23.    Julie Behrendt RN

## 2024-01-05 ENCOUNTER — ANCILLARY ORDERS (OUTPATIENT)
Dept: MAMMOGRAPHY | Facility: CLINIC | Age: 49
End: 2024-01-05

## 2024-01-05 DIAGNOSIS — Z51.81 ENCOUNTER FOR THERAPEUTIC DRUG MONITORING: Primary | ICD-10-CM

## 2024-01-05 DIAGNOSIS — N95.1 MENOPAUSAL STATE: ICD-10-CM

## 2024-01-05 DIAGNOSIS — Z12.31 VISIT FOR SCREENING MAMMOGRAM: ICD-10-CM

## 2024-01-10 ENCOUNTER — LAB (OUTPATIENT)
Dept: LAB | Facility: CLINIC | Age: 49
End: 2024-01-10
Payer: COMMERCIAL

## 2024-01-10 DIAGNOSIS — Z51.81 ENCOUNTER FOR THERAPEUTIC DRUG MONITORING: ICD-10-CM

## 2024-01-10 DIAGNOSIS — N95.1 MENOPAUSAL STATE: ICD-10-CM

## 2024-01-10 LAB
ALBUMIN UR-MCNC: NEGATIVE MG/DL
APPEARANCE UR: CLEAR
BACTERIA #/AREA URNS HPF: ABNORMAL /HPF
BILIRUB UR QL STRIP: NEGATIVE
COLOR UR AUTO: YELLOW
ESTRADIOL SERPL-MCNC: 50 PG/ML
FASTING STATUS PATIENT QL REPORTED: YES
GLUCOSE SERPL-MCNC: 89 MG/DL (ref 70–99)
GLUCOSE UR STRIP-MCNC: NEGATIVE MG/DL
HBA1C MFR BLD: 5 % (ref 0–5.6)
HGB UR QL STRIP: NEGATIVE
KETONES UR STRIP-MCNC: NEGATIVE MG/DL
LEUKOCYTE ESTERASE UR QL STRIP: NEGATIVE
NITRATE UR QL: NEGATIVE
PH UR STRIP: 8.5 [PH] (ref 5–7)
RBC #/AREA URNS AUTO: ABNORMAL /HPF
SHBG SERPL-SCNC: 93 NMOL/L (ref 30–135)
SP GR UR STRIP: 1.01 (ref 1–1.03)
SQUAMOUS #/AREA URNS AUTO: ABNORMAL /LPF
UROBILINOGEN UR STRIP-ACNC: 4 E.U./DL
WBC #/AREA URNS AUTO: ABNORMAL /HPF

## 2024-01-10 PROCEDURE — 84270 ASSAY OF SEX HORMONE GLOBUL: CPT

## 2024-01-10 PROCEDURE — 81001 URINALYSIS AUTO W/SCOPE: CPT

## 2024-01-10 PROCEDURE — 36415 COLL VENOUS BLD VENIPUNCTURE: CPT

## 2024-01-10 PROCEDURE — 82670 ASSAY OF TOTAL ESTRADIOL: CPT

## 2024-01-10 PROCEDURE — 83036 HEMOGLOBIN GLYCOSYLATED A1C: CPT

## 2024-01-10 PROCEDURE — 82947 ASSAY GLUCOSE BLOOD QUANT: CPT

## 2024-01-12 ENCOUNTER — HOSPITAL ENCOUNTER (OUTPATIENT)
Dept: MAMMOGRAPHY | Facility: CLINIC | Age: 49
Discharge: HOME | End: 2024-01-12
Attending: NURSE PRACTITIONER | Admitting: NURSE PRACTITIONER
Payer: COMMERCIAL

## 2024-01-12 DIAGNOSIS — Z12.31 VISIT FOR SCREENING MAMMOGRAM: ICD-10-CM

## 2024-01-12 PROCEDURE — 77063 BREAST TOMOSYNTHESIS BI: CPT

## 2024-11-13 ENCOUNTER — PATIENT OUTREACH (OUTPATIENT)
Dept: CARE COORDINATION | Facility: CLINIC | Age: 49
End: 2024-11-13
Payer: COMMERCIAL

## 2024-11-22 DIAGNOSIS — F41.9 ANXIETY: ICD-10-CM

## 2024-11-22 DIAGNOSIS — N95.1 MENOPAUSAL SYNDROME (HOT FLASHES): ICD-10-CM

## 2024-11-27 ENCOUNTER — PATIENT OUTREACH (OUTPATIENT)
Dept: CARE COORDINATION | Facility: CLINIC | Age: 49
End: 2024-11-27
Payer: COMMERCIAL

## 2025-02-08 ENCOUNTER — HEALTH MAINTENANCE LETTER (OUTPATIENT)
Age: 50
End: 2025-02-08

## 2025-02-27 DIAGNOSIS — F41.9 ANXIETY: ICD-10-CM

## 2025-02-27 DIAGNOSIS — N95.1 MENOPAUSAL SYNDROME (HOT FLASHES): ICD-10-CM

## 2025-02-28 NOTE — TELEPHONE ENCOUNTER
Requested Prescriptions   Pending Prescriptions Disp Refills    FLUoxetine (PROZAC) 40 MG capsule [Pharmacy Med Name: FLUOXETINE HCL 40 MG CAPSULE] 90 capsule 2     Sig: TAKE 1 CAPSULE (40 MG) BY MOUTH DAILY IN ADDITION TO THE 20 MG DAILY FOR A TOTAL OF 60 MG       SSRIs Protocol Failed - 2/28/2025 10:02 AM        Failed - RAND-7 score of less than 5 in past 6 months.     Please review last RAND-7 score.           Failed - Recent (12 mo) or future (90 days) visit within the authorizing provider's specialty     The patient must have completed an in-person or virtual visit within the past 12 months or has a future visit scheduled within the next 90 days with the authorizing provider s specialty.  Urgent care and e-visits do not qualify as an office visit for this protocol.          Passed - Medication is active on med list and the sig matches. RN to manually verify dose and sig if red X/fail.     If the protocol passes (green check), you do not need to verify med dose and sig.    A prescription matches if they are the same clinical intention.    For Example: once daily and every morning are the same.    For all fails (red x), verify dose and sig.    If the refill does match what is on file, the RN can still proceed to approve the refill request.     If they do not match, route to the appropriate provider.             Passed - Medication indicated for associated diagnosis     Medication is associated with one or more of the following diagnoses:              Anxiety             Bipolar  Depression  Obsessive-compulsive disorder             Panic disorder  Postmenopausal flushing             Premenstrual dysphoric disorder             Social phobia   Adjustment disorder with depressed mood   Mood disorder   Adjustment disorder with anxious mood          Passed - Patient is age 18 or older        Passed - No active pregnancy on record        Passed - No positive pregnancy test in last 12 months

## 2025-03-03 RX ORDER — FLUOXETINE HYDROCHLORIDE 40 MG/1
40 CAPSULE ORAL DAILY
Qty: 30 CAPSULE | Refills: 0 | Status: SHIPPED | OUTPATIENT
Start: 2025-03-03

## 2025-03-30 ENCOUNTER — HEALTH MAINTENANCE LETTER (OUTPATIENT)
Age: 50
End: 2025-03-30

## 2025-04-01 ENCOUNTER — MYC REFILL (OUTPATIENT)
Dept: FAMILY MEDICINE | Facility: CLINIC | Age: 50
End: 2025-04-01
Payer: COMMERCIAL

## 2025-04-01 DIAGNOSIS — N95.1 MENOPAUSAL SYNDROME (HOT FLASHES): ICD-10-CM

## 2025-04-01 DIAGNOSIS — F41.9 ANXIETY: ICD-10-CM

## 2025-04-01 RX ORDER — FLUOXETINE HYDROCHLORIDE 40 MG/1
40 CAPSULE ORAL DAILY
Qty: 36 CAPSULE | Refills: 0 | Status: SHIPPED | OUTPATIENT
Start: 2025-04-01

## 2025-04-01 ASSESSMENT — ANXIETY QUESTIONNAIRES
7. FEELING AFRAID AS IF SOMETHING AWFUL MIGHT HAPPEN: NOT AT ALL
7. FEELING AFRAID AS IF SOMETHING AWFUL MIGHT HAPPEN: NOT AT ALL
2. NOT BEING ABLE TO STOP OR CONTROL WORRYING: SEVERAL DAYS
6. BECOMING EASILY ANNOYED OR IRRITABLE: SEVERAL DAYS
GAD7 TOTAL SCORE: 5
GAD7 TOTAL SCORE: 5
3. WORRYING TOO MUCH ABOUT DIFFERENT THINGS: SEVERAL DAYS
5. BEING SO RESTLESS THAT IT IS HARD TO SIT STILL: NOT AT ALL
IF YOU CHECKED OFF ANY PROBLEMS ON THIS QUESTIONNAIRE, HOW DIFFICULT HAVE THESE PROBLEMS MADE IT FOR YOU TO DO YOUR WORK, TAKE CARE OF THINGS AT HOME, OR GET ALONG WITH OTHER PEOPLE: NOT DIFFICULT AT ALL
GAD7 TOTAL SCORE: 5
4. TROUBLE RELAXING: SEVERAL DAYS
8. IF YOU CHECKED OFF ANY PROBLEMS, HOW DIFFICULT HAVE THESE MADE IT FOR YOU TO DO YOUR WORK, TAKE CARE OF THINGS AT HOME, OR GET ALONG WITH OTHER PEOPLE?: NOT DIFFICULT AT ALL
1. FEELING NERVOUS, ANXIOUS, OR ON EDGE: SEVERAL DAYS

## 2025-04-01 ASSESSMENT — PATIENT HEALTH QUESTIONNAIRE - PHQ9
SUM OF ALL RESPONSES TO PHQ QUESTIONS 1-9: 4
SUM OF ALL RESPONSES TO PHQ QUESTIONS 1-9: 4
10. IF YOU CHECKED OFF ANY PROBLEMS, HOW DIFFICULT HAVE THESE PROBLEMS MADE IT FOR YOU TO DO YOUR WORK, TAKE CARE OF THINGS AT HOME, OR GET ALONG WITH OTHER PEOPLE: NOT DIFFICULT AT ALL

## 2025-04-01 NOTE — TELEPHONE ENCOUNTER
Requested Prescriptions   Pending Prescriptions Disp Refills    FLUoxetine (PROZAC) 20 MG capsule 36 capsule 0     Sig: TAKE 1 CAPSULE (20 MG) BY MOUTH DAILY IN ADDITION TO THE 40 MG CAPSULE FOR A TOTAL OF 60 MG DAILY       SSRIs Protocol Failed - 4/1/2025  3:49 PM        Failed - RAND-7 score of less than 5 in past 6 months.     Please review last RAND-7 score.       5/30/2017     9:52 AM 8/4/2022    10:18 AM 12/13/2023     8:29 AM   RAND-7 SCORE   Total Score 6 3 7             Failed - Recent (12 mo) or future (90 days) visit within the authorizing provider's specialty     The patient must have completed an in-person or virtual visit within the past 12 months or has a future visit scheduled within the next 90 days with the authorizing provider s specialty.  Urgent care and e-visits do not qualify as an office visit for this protocol.          Passed - Medication is active on med list and the sig matches. RN to manually verify dose and sig if red X/fail.     If the protocol passes (green check), you do not need to verify med dose and sig.    A prescription matches if they are the same clinical intention.    For Example: once daily and every morning are the same.    The protocol can not identify upper and lower case letters as matching and will fail.     For Example: Take 1 tablet (50 mg) by mouth daily     TAKE 1 TABLET (50 MG) BY MOUTH DAILY    For all fails (red x), verify dose and sig.    If the refill does match what is on file, the RN can still proceed to approve the refill request.       If they do not match, route to the appropriate provider.             Passed - Medication indicated for associated diagnosis     Medication is associated with one or more of the following diagnoses:              Anxiety             Bipolar  Depression  Obsessive-compulsive disorder             Panic disorder  Postmenopausal flushing             Premenstrual dysphoric disorder             Social phobia   Adjustment disorder with  depressed mood   Mood disorder   Adjustment disorder with anxious mood          Passed - Patient is age 18 or older        Passed - No active pregnancy on record        Passed - No positive pregnancy test in last 12 months          FLUoxetine (PROZAC) 40 MG capsule 36 capsule 0     Sig: Take 1 capsule (40 mg) by mouth daily. In addition to the 20 mg daily for a total of 60 mg       SSRIs Protocol Failed - 4/1/2025  3:49 PM        Failed - RAND-7 score of less than 5 in past 6 months.     Please review last RAND-7 score.       5/30/2017     9:52 AM 8/4/2022    10:18 AM 12/13/2023     8:29 AM   RAND-7 SCORE   Total Score 6 3 7             Failed - Recent (12 mo) or future (90 days) visit within the authorizing provider's specialty     The patient must have completed an in-person or virtual visit within the past 12 months or has a future visit scheduled within the next 90 days with the authorizing provider s specialty.  Urgent care and e-visits do not qualify as an office visit for this protocol.          Passed - Medication is active on med list and the sig matches. RN to manually verify dose and sig if red X/fail.     If the protocol passes (green check), you do not need to verify med dose and sig.    A prescription matches if they are the same clinical intention.    For Example: once daily and every morning are the same.    The protocol can not identify upper and lower case letters as matching and will fail.     For Example: Take 1 tablet (50 mg) by mouth daily     TAKE 1 TABLET (50 MG) BY MOUTH DAILY    For all fails (red x), verify dose and sig.    If the refill does match what is on file, the RN can still proceed to approve the refill request.       If they do not match, route to the appropriate provider.             Passed - Medication indicated for associated diagnosis     Medication is associated with one or more of the following diagnoses:              Anxiety              Bipolar  Depression  Obsessive-compulsive disorder             Panic disorder  Postmenopausal flushing             Premenstrual dysphoric disorder             Social phobia   Adjustment disorder with depressed mood   Mood disorder   Adjustment disorder with anxious mood          Passed - Patient is age 18 or older        Passed - No active pregnancy on record        Passed - No positive pregnancy test in last 12 months                5/30/2017     9:52 AM 8/4/2022    10:18 AM 12/13/2023     8:29 AM   PHQ   PHQ-9 Total Score 7 2 6   Q9: Thoughts of better off dead/self-harm past 2 weeks Not at all Not at all Not at all         5/30/2017     9:52 AM 8/4/2022    10:18 AM 12/13/2023     8:29 AM   RAND-7 SCORE   Total Score 6 3 7       [employer] message sent to patient with PHQ-9/RAND-7 questionnaire for completion,       Last office visit: 12/13/2023 ; last virtual visit: Visit date not found with prescribing provider:     Future Office Visit:   Next 5 appointments (look out 90 days)      May 06, 2025 8:00 AM  (Arrive by 7:40 AM)  Adult Preventative Visit with ANDREW Chappell CNP  [facility] ([facility] ) [address] [place] [id number] [phone]           Julie Behrendt RN

## 2025-05-01 DIAGNOSIS — F41.9 ANXIETY: ICD-10-CM

## 2025-05-01 DIAGNOSIS — N95.1 MENOPAUSAL SYNDROME (HOT FLASHES): ICD-10-CM

## 2025-05-01 RX ORDER — FLUOXETINE HYDROCHLORIDE 40 MG/1
40 CAPSULE ORAL DAILY
Qty: 90 CAPSULE | Refills: 1 | OUTPATIENT
Start: 2025-05-01

## 2025-05-04 DIAGNOSIS — N95.1 MENOPAUSAL SYNDROME (HOT FLASHES): ICD-10-CM

## 2025-05-04 DIAGNOSIS — F41.9 ANXIETY: ICD-10-CM

## 2025-05-06 RX ORDER — FLUOXETINE HYDROCHLORIDE 40 MG/1
40 CAPSULE ORAL DAILY
Qty: 30 CAPSULE | Refills: 0 | Status: SHIPPED | OUTPATIENT
Start: 2025-05-06

## 2025-05-21 DIAGNOSIS — F41.9 ANXIETY: ICD-10-CM

## 2025-05-21 DIAGNOSIS — N95.1 MENOPAUSAL SYNDROME (HOT FLASHES): ICD-10-CM

## 2025-05-21 NOTE — TELEPHONE ENCOUNTER
Routing refill request to provider for review/approval because:  Elizabeth given x1 and patient did not follow up, please advise  Labs out of range:        8/4/2022    10:18 AM 12/13/2023     8:29 AM 4/1/2025     3:49 PM   RAND-7 SCORE   Total Score   5 (mild anxiety)   Total Score 3 7 5        Patient-reported         8/4/2022    10:18 AM 12/13/2023     8:29 AM 4/1/2025     3:50 PM   PHQ   PHQ-9 Total Score 2 6 4    Q9: Thoughts of better off dead/self-harm past 2 weeks Not at all Not at all Not at all       Patient-reported     Asking for bridge refill again, has appointment 5/29/25 with Saadia Soares NP.   Julie Behrendt RN

## 2025-05-28 SDOH — HEALTH STABILITY: PHYSICAL HEALTH: ON AVERAGE, HOW MANY MINUTES DO YOU ENGAGE IN EXERCISE AT THIS LEVEL?: 40 MIN

## 2025-05-28 SDOH — HEALTH STABILITY: PHYSICAL HEALTH: ON AVERAGE, HOW MANY DAYS PER WEEK DO YOU ENGAGE IN MODERATE TO STRENUOUS EXERCISE (LIKE A BRISK WALK)?: 5 DAYS

## 2025-05-28 ASSESSMENT — PATIENT HEALTH QUESTIONNAIRE - PHQ9
10. IF YOU CHECKED OFF ANY PROBLEMS, HOW DIFFICULT HAVE THESE PROBLEMS MADE IT FOR YOU TO DO YOUR WORK, TAKE CARE OF THINGS AT HOME, OR GET ALONG WITH OTHER PEOPLE: SOMEWHAT DIFFICULT
SUM OF ALL RESPONSES TO PHQ QUESTIONS 1-9: 5
SUM OF ALL RESPONSES TO PHQ QUESTIONS 1-9: 5

## 2025-05-28 ASSESSMENT — ANXIETY QUESTIONNAIRES
1. FEELING NERVOUS, ANXIOUS, OR ON EDGE: SEVERAL DAYS
GAD7 TOTAL SCORE: 6
4. TROUBLE RELAXING: SEVERAL DAYS
GAD7 TOTAL SCORE: 6
2. NOT BEING ABLE TO STOP OR CONTROL WORRYING: SEVERAL DAYS
3. WORRYING TOO MUCH ABOUT DIFFERENT THINGS: SEVERAL DAYS
GAD7 TOTAL SCORE: 6
5. BEING SO RESTLESS THAT IT IS HARD TO SIT STILL: NOT AT ALL
7. FEELING AFRAID AS IF SOMETHING AWFUL MIGHT HAPPEN: SEVERAL DAYS
6. BECOMING EASILY ANNOYED OR IRRITABLE: SEVERAL DAYS
7. FEELING AFRAID AS IF SOMETHING AWFUL MIGHT HAPPEN: SEVERAL DAYS

## 2025-05-28 ASSESSMENT — SOCIAL DETERMINANTS OF HEALTH (SDOH): HOW OFTEN DO YOU GET TOGETHER WITH FRIENDS OR RELATIVES?: TWICE A WEEK

## 2025-05-29 ENCOUNTER — OFFICE VISIT (OUTPATIENT)
Dept: FAMILY MEDICINE | Facility: CLINIC | Age: 50
End: 2025-05-29
Payer: COMMERCIAL

## 2025-05-29 VITALS
BODY MASS INDEX: 30.37 KG/M2 | DIASTOLIC BLOOD PRESSURE: 75 MMHG | RESPIRATION RATE: 15 BRPM | HEART RATE: 56 BPM | TEMPERATURE: 97.9 F | OXYGEN SATURATION: 99 % | SYSTOLIC BLOOD PRESSURE: 112 MMHG | HEIGHT: 66 IN | WEIGHT: 189 LBS

## 2025-05-29 DIAGNOSIS — Z12.31 VISIT FOR SCREENING MAMMOGRAM: ICD-10-CM

## 2025-05-29 DIAGNOSIS — N95.1 MENOPAUSAL SYNDROME (HOT FLASHES): ICD-10-CM

## 2025-05-29 DIAGNOSIS — F41.9 ANXIETY: ICD-10-CM

## 2025-05-29 DIAGNOSIS — Z00.00 ROUTINE GENERAL MEDICAL EXAMINATION AT A HEALTH CARE FACILITY: Primary | ICD-10-CM

## 2025-05-29 RX ORDER — PROGESTERONE 100 MG/1
200 CAPSULE ORAL DAILY
Status: CANCELLED | OUTPATIENT
Start: 2025-05-29

## 2025-05-29 RX ORDER — FLUOXETINE HYDROCHLORIDE 40 MG/1
40 CAPSULE ORAL DAILY
Qty: 90 CAPSULE | Refills: 3 | Status: SHIPPED | OUTPATIENT
Start: 2025-05-29

## 2025-05-29 RX ORDER — ESTRADIOL 0.07 MG/D
1 FILM, EXTENDED RELEASE TRANSDERMAL
COMMUNITY
Start: 2025-04-03

## 2025-05-29 RX ORDER — ALPRAZOLAM 0.5 MG
0.5 TABLET ORAL 3 TIMES DAILY PRN
Qty: 10 TABLET | Refills: 0 | Status: SHIPPED | OUTPATIENT
Start: 2025-05-29

## 2025-05-29 NOTE — PROGRESS NOTES
Preventive Care Visit  [facility]  ANDREW Chappell CNP, Family Medicine  May 29, 2025    Assessment & Plan     Routine general medical examination at a health care facility  Megan is a pleasant 50-year-old who presents for routine physical and medication management.    Anxiety  Stable with current medication.  Rare use of Xanax.  [place] prescription monitoring reviewed without concerns.  - ALPRAZolam (XANAX) 0.5 MG tablet; Take 1 tablet (0.5 mg) by mouth 3 times daily as needed for anxiety.  - FLUoxetine (PROZAC) 20 MG capsule; TAKE 1 CAPSULE (20 MG) BY MOUTH DAILY IN ADDITION TO THE 40 MG CAPSULE FOR A TOTAL OF 60 MG DAILY  - FLUoxetine (PROZAC) 40 MG capsule; Take 1 capsule (40 mg) by mouth daily. In addition to the 20 mg daily for a total of 60 mg    Menopausal syndrome (hot flashes)  Improved with medications  - FLUoxetine (PROZAC) 20 MG capsule; TAKE 1 CAPSULE (20 MG) BY MOUTH DAILY IN ADDITION TO THE 40 MG CAPSULE FOR A TOTAL OF 60 MG DAILY  - FLUoxetine (PROZAC) 40 MG capsule; Take 1 capsule (40 mg) by mouth daily. In addition to the 20 mg daily for a total of 60 mg    Visit for screening mammogram    - MA Screening Bilateral w/ Sergio; Future      Counseling  Appropriate preventive services were addressed with this patient via screening, questionnaire, or discussion as appropriate for fall prevention, nutrition, physical activity, Tobacco-use cessation, social engagement, weight loss and cognition.  Checklist reviewing preventive services available has been given to the patient.  Reviewed patient's diet, addressing concerns and/or questions.   The patient's PHQ-9 score is consistent with mild depression. She was provided with information regarding depression.       Subjective   Megan is a 50 year old, presenting for the following:  Physical        5/29/2025     9:35 AM   Additional Questions   Roomed by Amanda   Accompanied by Self          HPI    Advance Care  Planning    Discussed advance care planning with patient; informed AVS has link to [facility] [employer].        5/28/2025   General Health   How would you rate your overall physical health? Good   Feel stress (tense, anxious, or unable to sleep) Only a little   (!) STRESS CONCERN      5/28/2025   Nutrition   Three or more servings of calcium each day? Yes   Diet: Regular (no restrictions)   How many servings of fruit and vegetables per day? 4 or more   How many sweetened beverages each day? (!) 3         5/28/2025   Exercise   Days per week of moderate/strenous exercise 5 days   Average minutes spent exercising at this level 40 min         5/28/2025   Social Factors   Frequency of gathering with friends or relatives Twice a week   Worry food won't last until get money to buy more No   Food not last or not have enough money for food? No   Do you have housing? (Housing is defined as stable permanent housing and does not include staying outside in a car, in a tent, in an abandoned building, in an overnight shelter, or couch-surfing.) Yes   Are you worried about losing your housing? No   Lack of transportation? No   Unable to get utilities (heat,electricity)? Yes   Want help with housing or utility concern? No   (!) FINANCIAL RESOURCE STRAIN CONCERN      5/28/2025   Fall Risk   Fallen 2 or more times in the past year? No   Trouble with walking or balance? No          5/28/2025   Dental   Dentist two times every year? Yes       Today's PHQ-9 Score:       5/28/2025     5:56 PM   PHQ-9 SCORE   PHQ-9 Total Score MyChart 5 (Mild depression)   PHQ-9 Total Score 5        Patient-reported         5/28/2025   Substance Use   Alcohol more than 3/day or more than 7/wk No   Do you use any other substances recreationally? No     Social History     Tobacco Use    Smoking status: Former     Current packs/day: 0.00     Average packs/day: 0.3 packs/day for 18.0 years (5.4 ttl pk-yrs)     Types: Cigarettes     Start date: 7/1/1999     Quit  "date: 2017     Years since quittin.9    Smokeless tobacco: Never   Vaping Use    Vaping status: Never Used   Substance Use Topics    Alcohol use: Not Currently     Comment: SOCIALLY    Drug use: No         2024   LAST FHS-7 RESULTS   1st degree relative breast or ovarian cancer No   Any relative bilateral breast cancer No   Any male have breast cancer No   Any ONE woman have BOTH breast AND ovarian cancer No   Any woman with breast cancer before 50yrs No   2 or more relatives with breast AND/OR ovarian cancer No   2 or more relatives with breast AND/OR bowel cancer No     Mammogram Screening - Mammogram every 1-2 years updated in Health Maintenance based on mutual decision making        2025   STI Screening   New sexual partner(s) since last STI/HIV test? No     History of abnormal Pap smear: No - age 30- 64 PAP with HPV every 5 years recommended        Latest Ref Rng & Units 10/26/2022     8:05 AM 2017    10:10 AM 2017    10:06 AM   PAP / HPV   PAP  Negative for Intraepithelial Lesion or Malignancy (NILM)      PAP (Historical)    NIL    HPV 16 DNA Negative Negative  Negative     HPV 18 DNA Negative Negative  Negative     Other HR HPV Negative Negative  Negative       ASCVD Risk   The 10-year ASCVD risk score (Shamika MOHAMUD, et al., 2019) is: 0.7%    Values used to calculate the score:      Age: 50 years      Sex: Female      Is Non- : No      Diabetic: No      Tobacco smoker: No      Systolic Blood Pressure: 112 mmHg      Is BP treated: No      HDL Cholesterol: 65 mg/dL      Total Cholesterol: 162 mg/dL      Reviewed and updated as needed this visit by Provider                    Review of Systems  Constitutional, HEENT, cardiovascular, pulmonary, gi and gu systems are negative, except as otherwise noted.     Objective    Exam  /75   Pulse 56   Temp 97.9  F (36.6  C) (Tympanic)   Resp 15   Ht 1.676 m (5' 5.98\")   Wt 85.7 kg (189 lb)   SpO2 99%   " "BMI 30.52 kg/m     Estimated body mass index is 30.52 kg/m  as calculated from the following:    Height as of this encounter: 1.676 m (5' 5.98\").    Weight as of this encounter: 85.7 kg (189 lb).    Physical Exam  GENERAL: alert and no distress  EYES: Eyes grossly normal to inspection, PERRL and conjunctivae and sclerae normal  HENT: ear canals and TM's normal, nose and mouth without ulcers or lesions  NECK: no adenopathy, no asymmetry, masses, or scars  RESP: lungs clear to auscultation - no rales, rhonchi or wheezes  BREAST: normal without masses, tenderness or nipple discharge and no palpable axillary masses or adenopathy, bilateral implants present  CV: regular rate and rhythm, normal S1 S2, no S3 or S4, no murmur, click or rub, no peripheral edema  ABDOMEN: soft, nontender, no hepatosplenomegaly, no masses and bowel sounds normal  MS: no gross musculoskeletal defects noted, no edema  SKIN: no suspicious lesions or rashes  NEURO: Normal strength and tone, mentation intact and speech normal  PSYCH: mentation appears normal, affect normal/bright        Signed Electronically by: ANDREW Chappell CNP    Answers submitted by the patient for this visit:  Patient Health Questionnaire (Submitted on 5/28/2025)  If you checked off any problems, how difficult have these problems made it for you to do your work, take care of things at home, or get along with other people?: Somewhat difficult  PHQ9 TOTAL SCORE: 5  Patient Health Questionnaire (G7) (Submitted on 5/28/2025)  RAND 7 TOTAL SCORE: 6    "

## 2025-05-29 NOTE — PATIENT INSTRUCTIONS
Patient Education   Preventive Care Advice   This is general advice given by our system to help you stay healthy. However, your care team may have specific advice just for you. Please talk to your care team about your preventive care needs.  Nutrition  Eat 5 or more servings of fruits and vegetables each day.  Try wheat bread, brown rice and whole grain pasta (instead of white bread, rice, and pasta).  Get enough calcium and vitamin D. Check the label on foods and aim for 100% of the RDA (recommended daily allowance).  Lifestyle  Exercise at least 150 minutes each week  (30 minutes a day, 5 days a week).  Do muscle strengthening activities 2 days a week. These help control your weight and prevent disease.  No smoking.  Wear sunscreen to prevent skin cancer.  Have a dental exam and cleaning every 6 months.  Yearly exams  See your health care team every year to talk about:  Any changes in your health.  Any medicines your care team has prescribed.  Preventive care, family planning, and ways to prevent chronic diseases.  Shots (vaccines)   HPV shots (up to age 26), if you've never had them before.  Hepatitis B shots (up to age 59), if you've never had them before.  COVID-19 shot: Get this shot when it's due.  Flu shot: Get a flu shot every year.  Tetanus shot: Get a tetanus shot every 10 years.  Pneumococcal, hepatitis A, and RSV shots: Ask your care team if you need these based on your risk.  Shingles shot (for age 50 and up)  General health tests  Diabetes screening:  Starting at age 35, Get screened for diabetes at least every 3 years.  If you are younger than age 35, ask your care team if you should be screened for diabetes.  Cholesterol test: At age 39, start having a cholesterol test every 5 years, or more often if advised.  Bone density scan (DEXA): At age 50, ask your care team if you should have this scan for osteoporosis (brittle bones).  Hepatitis C: Get tested at least once in your life.  STIs (sexually  transmitted infections)  Before age 24: Ask your care team if you should be screened for STIs.  After age 24: Get screened for STIs if you're at risk. You are at risk for STIs (including HIV) if:  You are sexually active with more than one person.  You don't use condoms every time.  You or a partner was diagnosed with a sexually transmitted infection.  If you are at risk for HIV, ask about PrEP medicine to prevent HIV.  Get tested for HIV at least once in your life, whether you are at risk for HIV or not.  Cancer screening tests  Cervical cancer screening: If you have a cervix, begin getting regular cervical cancer screening tests starting at age 21.  Breast cancer scan (mammogram): If you've ever had breasts, begin having regular mammograms starting at age 40. This is a scan to check for breast cancer.  Colon cancer screening: It is important to start screening for colon cancer at age 45.  Have a colonoscopy test every 10 years (or more often if you're at risk) Or, ask your provider about stool tests like a FIT test every year or Cologuard test every 3 years.  To learn more about your testing options, visit:   .  For help making a decision, visit:   https://bit.ly/wv94989.  Prostate cancer screening test: If you have a prostate, ask your care team if a prostate cancer screening test (PSA) at age 55 is right for you.  Lung cancer screening: If you are a current or former smoker ages 50 to 80, ask your care team if ongoing lung cancer screenings are right for you.  For informational purposes only. Not to replace the advice of your health care provider. Copyright   2023 [facility]. All rights reserved. Clinically reviewed by the [facility]. [employer] 280194 - REV 01/24.  Learning About Depression Screening  What is depression screening?  Depression screening is a way to see if you have depression symptoms. It may be done by a doctor or counselor. It's often part of a routine  "checkup. That's because your mental health is just as important as your physical health.  Depression is a mental health condition that affects how you feel, think, and act. You may:  Have less energy.  Lose interest in your daily activities.  Feel sad and grouchy for a long time.  Depression is very common. It affects people of all ages.  Many things can lead to depression. Some people become depressed after they have a stroke or find out they have a major illness like cancer or heart disease. The death of a loved one or a breakup may lead to depression. It can run in families. Most experts believe that a combination of inherited genes and stressful life events can cause it.  What happens during screening?  You may be asked to fill out a form about your depression symptoms. You and the doctor will discuss your answers. The doctor may ask you more questions to learn more about how you think, act, and feel.  What happens after screening?  If you have symptoms of depression, your doctor will talk to you about your options.  Doctors usually treat depression with medicines or counseling. Often, combining the two works best. Many people don't get help because they think that they'll get over the depression on their own. But people with depression may not get better unless they get treatment.  The cause of depression is not well understood. There may be many factors involved. But if you have depression, it's not your fault.  A serious symptom of depression is thinking about death or suicide. If you or someone you care about talks about this or about feeling hopeless, get help right away.  It's important to know that depression can be treated. Medicine, counseling, and self-care may help.  Where can you learn more?  Go to https://www.healthwise.net/patiented  Enter T185 in the search box to learn more about \"Learning About Depression Screening.\"  Current as of: July 31, 2024  Content Version: 14.4    [id number] Cindy " [employer].   Care instructions adapted under license by your healthcare professional. If you have questions about a medical condition or this instruction, always ask your healthcare professional. [facility] [employer] disclaims any warranty or liability for your use of this information.

## 2025-07-11 ENCOUNTER — HOSPITAL ENCOUNTER (OUTPATIENT)
Dept: MAMMOGRAPHY | Facility: CLINIC | Age: 50
End: 2025-07-11
Attending: NURSE PRACTITIONER
Payer: COMMERCIAL

## 2025-07-28 ENCOUNTER — HOSPITAL ENCOUNTER (OUTPATIENT)
Dept: MAMMOGRAPHY | Facility: CLINIC | Age: 50
Discharge: HOME | End: 2025-07-28
Attending: NURSE PRACTITIONER | Admitting: NURSE PRACTITIONER
Payer: COMMERCIAL

## 2025-07-28 DIAGNOSIS — Z12.31 VISIT FOR SCREENING MAMMOGRAM: ICD-10-CM

## 2025-07-28 PROCEDURE — 77063 BREAST TOMOSYNTHESIS BI: CPT

## 2025-07-29 ENCOUNTER — ANCILLARY PROCEDURE (OUTPATIENT)
Dept: MAMMOGRAPHY | Facility: CLINIC | Age: 50
End: 2025-07-29
Attending: NURSE PRACTITIONER
Payer: COMMERCIAL

## 2025-07-29 DIAGNOSIS — R92.8 ABNORMAL MAMMOGRAM: ICD-10-CM

## 2025-07-29 PROCEDURE — 76642 ULTRASOUND BREAST LIMITED: CPT | Mod: RT

## (undated) DEVICE — NDL 19GA 1.5"

## (undated) DEVICE — SOL WATER IRRIG 1000ML BOTTLE 07139-09

## (undated) DEVICE — CAST PADDING 4" UNSTERILE 9044

## (undated) DEVICE — DRAPE STERI TOWEL SM 1000

## (undated) DEVICE — GLOVE PROTEXIS BLUE W/NEU-THERA 7.5  2D73EB75

## (undated) DEVICE — BNDG ELASTIC 4"X5YDS UNSTERILE 6611-40

## (undated) DEVICE — GLOVE PROTEXIS W/NEU-THERA 8.0  2D73TE80

## (undated) DEVICE — CAST PLASTER SPLINT 4X15" 7394

## (undated) DEVICE — GLOVE PROTEXIS W/NEU-THERA 7.5  2D73TE75

## (undated) DEVICE — SU ETHILON 4-0 FS-2 18" 662H

## (undated) DEVICE — NDL 25GA 1.5" 305127

## (undated) DEVICE — PACK HAND WRIST SOP15HWFSP

## (undated) RX ORDER — FENTANYL CITRATE 50 UG/ML
INJECTION, SOLUTION INTRAMUSCULAR; INTRAVENOUS
Status: DISPENSED
Start: 2018-06-21

## (undated) RX ORDER — ACETAMINOPHEN 325 MG/1
TABLET ORAL
Status: DISPENSED
Start: 2018-06-21

## (undated) RX ORDER — CEFAZOLIN SODIUM 2 G/100ML
INJECTION, SOLUTION INTRAVENOUS
Status: DISPENSED
Start: 2018-06-21

## (undated) RX ORDER — GABAPENTIN 300 MG/1
CAPSULE ORAL
Status: DISPENSED
Start: 2018-06-21